# Patient Record
Sex: MALE | Race: WHITE | Employment: FULL TIME | URBAN - METROPOLITAN AREA
[De-identification: names, ages, dates, MRNs, and addresses within clinical notes are randomized per-mention and may not be internally consistent; named-entity substitution may affect disease eponyms.]

---

## 2017-01-17 RX ORDER — METOPROLOL SUCCINATE 25 MG/1
25 TABLET, EXTENDED RELEASE ORAL DAILY
Qty: 30 TABLET | Refills: 3 | Status: SHIPPED | OUTPATIENT
Start: 2017-01-17 | End: 2017-04-26 | Stop reason: SDUPTHER

## 2017-04-26 ENCOUNTER — OFFICE VISIT (OUTPATIENT)
Dept: CARDIOLOGY | Age: 47
End: 2017-04-26
Payer: COMMERCIAL

## 2017-04-26 ENCOUNTER — TELEPHONE (OUTPATIENT)
Dept: CARDIOLOGY | Age: 47
End: 2017-04-26

## 2017-04-26 VITALS
RESPIRATION RATE: 20 BRPM | HEIGHT: 70 IN | SYSTOLIC BLOOD PRESSURE: 122 MMHG | WEIGHT: 246 LBS | HEART RATE: 94 BPM | DIASTOLIC BLOOD PRESSURE: 82 MMHG | OXYGEN SATURATION: 94 % | BODY MASS INDEX: 35.22 KG/M2

## 2017-04-26 DIAGNOSIS — Z82.49 FAMILY HISTORY OF ABDOMINAL AORTIC ANEURYSM (AAA): ICD-10-CM

## 2017-04-26 DIAGNOSIS — R00.2 HEART PALPITATIONS: ICD-10-CM

## 2017-04-26 DIAGNOSIS — R42 DIZZINESS AND GIDDINESS: Primary | ICD-10-CM

## 2017-04-26 DIAGNOSIS — R07.89 CHEST PAIN, NON-CARDIAC: ICD-10-CM

## 2017-04-26 DIAGNOSIS — E66.01 SEVERE OBESITY (BMI 35.0-39.9): ICD-10-CM

## 2017-04-26 DIAGNOSIS — I10 ESSENTIAL HYPERTENSION: ICD-10-CM

## 2017-04-26 PROCEDURE — 99214 OFFICE O/P EST MOD 30 MIN: CPT | Performed by: INTERNAL MEDICINE

## 2017-04-26 RX ORDER — METOPROLOL SUCCINATE 25 MG/1
25 TABLET, EXTENDED RELEASE ORAL 2 TIMES DAILY
Qty: 30 TABLET | Refills: 3 | Status: SHIPPED | OUTPATIENT
Start: 2017-04-26 | End: 2017-09-25 | Stop reason: SDUPTHER

## 2017-05-02 ENCOUNTER — HOSPITAL ENCOUNTER (OUTPATIENT)
Dept: CT IMAGING | Age: 47
Discharge: HOME | End: 2017-05-02
Payer: COMMERCIAL

## 2017-05-02 DIAGNOSIS — Z82.49 FAMILY HISTORY OF ABDOMINAL AORTIC ANEURYSM (AAA): ICD-10-CM

## 2017-05-02 PROCEDURE — 75635 CT ANGIO ABDOMINAL ARTERIES: CPT

## 2017-05-02 PROCEDURE — 6360000004 HC RX CONTRAST MEDICATION: Performed by: INTERNAL MEDICINE

## 2017-05-02 PROCEDURE — 71275 CT ANGIOGRAPHY CHEST: CPT

## 2017-05-02 RX ADMIN — IOVERSOL 100 ML: 678 INJECTION INTRA-ARTERIAL; INTRAVENOUS at 17:19

## 2017-05-11 ENCOUNTER — HOSPITAL ENCOUNTER (OUTPATIENT)
Age: 47
Discharge: HOME | End: 2017-05-11
Payer: COMMERCIAL

## 2017-05-11 ENCOUNTER — OFFICE VISIT (OUTPATIENT)
Dept: CARDIOLOGY | Age: 47
End: 2017-05-11
Payer: COMMERCIAL

## 2017-05-11 VITALS
BODY MASS INDEX: 34.99 KG/M2 | WEIGHT: 244.4 LBS | DIASTOLIC BLOOD PRESSURE: 76 MMHG | HEART RATE: 89 BPM | HEIGHT: 70 IN | SYSTOLIC BLOOD PRESSURE: 119 MMHG

## 2017-05-11 DIAGNOSIS — E78.5 DYSLIPIDEMIA: ICD-10-CM

## 2017-05-11 DIAGNOSIS — I20.9 ISCHEMIC CHEST PAIN (HCC): ICD-10-CM

## 2017-05-11 DIAGNOSIS — I25.84 CORONARY ARTERY CALCIFICATION: ICD-10-CM

## 2017-05-11 DIAGNOSIS — I25.10 CORONARY ARTERY CALCIFICATION: Primary | ICD-10-CM

## 2017-05-11 DIAGNOSIS — G47.33 OBSTRUCTIVE SLEEP APNEA: ICD-10-CM

## 2017-05-11 DIAGNOSIS — I25.10 CORONARY ARTERY CALCIFICATION: ICD-10-CM

## 2017-05-11 DIAGNOSIS — I25.84 CORONARY ARTERY CALCIFICATION: Primary | ICD-10-CM

## 2017-05-11 DIAGNOSIS — Z78.9 STATIN INTOLERANCE: ICD-10-CM

## 2017-05-11 LAB
ANION GAP SERPL CALCULATED.3IONS-SCNC: 16 MMOL/L (ref 9–17)
BUN BLDV-MCNC: 16 MG/DL (ref 6–20)
BUN/CREAT BLD: 16 (ref 9–20)
CALCIUM SERPL-MCNC: 9.5 MG/DL (ref 8.6–10.4)
CHLORIDE BLD-SCNC: 101 MMOL/L (ref 98–107)
CO2: 24 MMOL/L (ref 20–31)
CREAT SERPL-MCNC: 1 MG/DL (ref 0.7–1.2)
GFR AFRICAN AMERICAN: >60 ML/MIN
GFR NON-AFRICAN AMERICAN: >60 ML/MIN
GFR SERPL CREATININE-BSD FRML MDRD: NORMAL ML/MIN/{1.73_M2}
GFR SERPL CREATININE-BSD FRML MDRD: NORMAL ML/MIN/{1.73_M2}
GLUCOSE BLD-MCNC: 98 MG/DL (ref 70–99)
HCT VFR BLD CALC: 47.5 % (ref 41–53)
HEMOGLOBIN: 15.9 G/DL (ref 13.5–17)
MCH RBC QN AUTO: 28.7 PG (ref 26–34)
MCHC RBC AUTO-ENTMCNC: 33.4 G/DL (ref 31–37)
MCV RBC AUTO: 86 FL (ref 80–100)
PDW BLD-RTO: 14.3 % (ref 12.1–15.2)
PLATELET # BLD: 241 K/UL (ref 140–450)
PMV BLD AUTO: NORMAL FL (ref 6–12)
POTASSIUM SERPL-SCNC: 4.3 MMOL/L (ref 3.7–5.3)
RBC # BLD: 5.53 M/UL (ref 4.5–5.9)
SODIUM BLD-SCNC: 141 MMOL/L (ref 135–144)
WBC # BLD: 8.7 K/UL (ref 3.5–11)

## 2017-05-11 PROCEDURE — 85027 COMPLETE CBC AUTOMATED: CPT

## 2017-05-11 PROCEDURE — 99215 OFFICE O/P EST HI 40 MIN: CPT | Performed by: INTERNAL MEDICINE

## 2017-05-11 PROCEDURE — 36415 COLL VENOUS BLD VENIPUNCTURE: CPT

## 2017-05-11 PROCEDURE — 80048 BASIC METABOLIC PNL TOTAL CA: CPT

## 2017-05-11 RX ORDER — ASPIRIN 81 MG/1
81 TABLET ORAL DAILY
Qty: 30 TABLET | Refills: 3 | Status: SHIPPED | OUTPATIENT
Start: 2017-05-11 | End: 2018-11-26

## 2017-05-12 ENCOUNTER — HOSPITAL ENCOUNTER (OUTPATIENT)
Dept: CARDIAC CATH/INVASIVE PROCEDURES | Age: 47
Discharge: HOME | End: 2017-05-12
Payer: COMMERCIAL

## 2017-05-12 VITALS
RESPIRATION RATE: 16 BRPM | OXYGEN SATURATION: 92 % | SYSTOLIC BLOOD PRESSURE: 116 MMHG | WEIGHT: 244.49 LBS | TEMPERATURE: 98 F | HEART RATE: 71 BPM | DIASTOLIC BLOOD PRESSURE: 77 MMHG | BODY MASS INDEX: 35 KG/M2 | HEIGHT: 70 IN

## 2017-05-12 PROCEDURE — 99152 MOD SED SAME PHYS/QHP 5/>YRS: CPT | Performed by: FAMILY MEDICINE

## 2017-05-12 PROCEDURE — 2580000003 HC RX 258: Performed by: FAMILY MEDICINE

## 2017-05-12 PROCEDURE — 93458 L HRT ARTERY/VENTRICLE ANGIO: CPT | Performed by: FAMILY MEDICINE

## 2017-05-12 PROCEDURE — 2500000003 HC RX 250 WO HCPCS

## 2017-05-12 PROCEDURE — C1887 CATHETER, GUIDING: HCPCS

## 2017-05-12 PROCEDURE — C1769 GUIDE WIRE: HCPCS

## 2017-05-12 PROCEDURE — C1894 INTRO/SHEATH, NON-LASER: HCPCS

## 2017-05-12 PROCEDURE — 6360000002 HC RX W HCPCS

## 2017-05-12 PROCEDURE — C1725 CATH, TRANSLUMIN NON-LASER: HCPCS

## 2017-05-12 RX ORDER — ACETAMINOPHEN 325 MG/1
650 TABLET ORAL EVERY 4 HOURS PRN
Status: DISCONTINUED | OUTPATIENT
Start: 2017-05-12 | End: 2017-05-13 | Stop reason: HOSPADM

## 2017-05-12 RX ORDER — SODIUM CHLORIDE 0.9 % (FLUSH) 0.9 %
10 SYRINGE (ML) INJECTION EVERY 12 HOURS SCHEDULED
Status: DISCONTINUED | OUTPATIENT
Start: 2017-05-12 | End: 2017-05-13 | Stop reason: HOSPADM

## 2017-05-12 RX ORDER — SODIUM CHLORIDE 0.9 % (FLUSH) 0.9 %
10 SYRINGE (ML) INJECTION PRN
Status: DISCONTINUED | OUTPATIENT
Start: 2017-05-12 | End: 2017-05-13 | Stop reason: HOSPADM

## 2017-05-12 RX ORDER — NITROGLYCERIN 0.4 MG/1
0.4 TABLET SUBLINGUAL EVERY 5 MIN PRN
Status: DISCONTINUED | OUTPATIENT
Start: 2017-05-12 | End: 2017-05-13 | Stop reason: HOSPADM

## 2017-05-12 RX ORDER — DIPHENHYDRAMINE HCL 25 MG
50 TABLET ORAL ONCE
Status: DISCONTINUED | OUTPATIENT
Start: 2017-05-12 | End: 2017-05-13 | Stop reason: HOSPADM

## 2017-05-12 RX ORDER — SODIUM CHLORIDE 9 MG/ML
INJECTION, SOLUTION INTRAVENOUS CONTINUOUS
Status: DISCONTINUED | OUTPATIENT
Start: 2017-05-12 | End: 2017-05-13 | Stop reason: HOSPADM

## 2017-05-12 RX ADMIN — SODIUM CHLORIDE: 9 INJECTION, SOLUTION INTRAVENOUS at 09:55

## 2017-05-22 ENCOUNTER — OFFICE VISIT (OUTPATIENT)
Dept: CARDIOLOGY | Age: 47
End: 2017-05-22
Payer: COMMERCIAL

## 2017-05-22 VITALS
OXYGEN SATURATION: 94 % | BODY MASS INDEX: 34.38 KG/M2 | WEIGHT: 245.6 LBS | HEIGHT: 71 IN | SYSTOLIC BLOOD PRESSURE: 126 MMHG | HEART RATE: 120 BPM | DIASTOLIC BLOOD PRESSURE: 84 MMHG

## 2017-05-22 DIAGNOSIS — E66.9 OBESITY (BMI 30.0-34.9): ICD-10-CM

## 2017-05-22 DIAGNOSIS — E78.2 MIXED HYPERLIPIDEMIA: ICD-10-CM

## 2017-05-22 DIAGNOSIS — I25.10 CORONARY ARTERY CALCIFICATION SEEN ON CT SCAN: ICD-10-CM

## 2017-05-22 DIAGNOSIS — I25.10 MILD CAD: Primary | ICD-10-CM

## 2017-05-22 DIAGNOSIS — Z78.9 STATIN INTOLERANCE: ICD-10-CM

## 2017-05-22 DIAGNOSIS — G47.33 OSA (OBSTRUCTIVE SLEEP APNEA): ICD-10-CM

## 2017-05-22 PROCEDURE — 99214 OFFICE O/P EST MOD 30 MIN: CPT | Performed by: INTERNAL MEDICINE

## 2017-05-22 PROCEDURE — 93000 ELECTROCARDIOGRAM COMPLETE: CPT | Performed by: INTERNAL MEDICINE

## 2017-06-22 ENCOUNTER — TELEPHONE (OUTPATIENT)
Dept: CARDIOLOGY | Age: 47
End: 2017-06-22

## 2017-09-25 RX ORDER — METOPROLOL SUCCINATE 25 MG/1
25 TABLET, EXTENDED RELEASE ORAL 2 TIMES DAILY
Qty: 180 TABLET | Refills: 3 | Status: SHIPPED | OUTPATIENT
Start: 2017-09-25 | End: 2018-10-15 | Stop reason: SDUPTHER

## 2017-10-06 ENCOUNTER — APPOINTMENT (OUTPATIENT)
Dept: GENERAL RADIOLOGY | Age: 47
End: 2017-10-06
Payer: COMMERCIAL

## 2017-10-06 ENCOUNTER — HOSPITAL ENCOUNTER (EMERGENCY)
Age: 47
Discharge: HOME | End: 2017-10-06
Attending: EMERGENCY MEDICINE
Payer: COMMERCIAL

## 2017-10-06 VITALS
TEMPERATURE: 98.5 F | BODY MASS INDEX: 33.64 KG/M2 | HEIGHT: 70 IN | HEART RATE: 81 BPM | SYSTOLIC BLOOD PRESSURE: 116 MMHG | RESPIRATION RATE: 16 BRPM | OXYGEN SATURATION: 92 % | WEIGHT: 235 LBS | DIASTOLIC BLOOD PRESSURE: 82 MMHG

## 2017-10-06 DIAGNOSIS — R07.9 CHEST PAIN, UNSPECIFIED TYPE: Primary | ICD-10-CM

## 2017-10-06 LAB
ABSOLUTE EOS #: 0.16 K/UL (ref 0–0.4)
ABSOLUTE LYMPH #: 2.95 K/UL (ref 1–4.8)
ABSOLUTE MONO #: 0.74 K/UL (ref 0–1)
ALBUMIN SERPL-MCNC: 4.4 G/DL (ref 3.5–5.2)
ALBUMIN/GLOBULIN RATIO: 1.4 (ref 1–2.5)
ALP BLD-CCNC: 74 U/L (ref 40–129)
ALT SERPL-CCNC: 69 U/L (ref 5–41)
ANION GAP SERPL CALCULATED.3IONS-SCNC: 16 MMOL/L (ref 9–17)
AST SERPL-CCNC: 45 U/L
BASOPHILS # BLD: 0 %
BASOPHILS ABSOLUTE: 0 K/UL (ref 0–0.2)
BILIRUB SERPL-MCNC: 0.54 MG/DL (ref 0.3–1.2)
BUN BLDV-MCNC: 14 MG/DL (ref 6–20)
BUN/CREAT BLD: 12 (ref 9–20)
CALCIUM SERPL-MCNC: 9.6 MG/DL (ref 8.6–10.4)
CHLORIDE BLD-SCNC: 104 MMOL/L (ref 98–107)
CO2: 23 MMOL/L (ref 20–31)
CREAT SERPL-MCNC: 1.13 MG/DL (ref 0.7–1.2)
D-DIMER QUANTITATIVE: <0.19 MG/L FEU (ref 0.19–0.5)
DIFFERENTIAL TYPE: NORMAL
EKG ATRIAL RATE: 87 BPM
EKG P AXIS: 38 DEGREES
EKG P-R INTERVAL: 148 MS
EKG Q-T INTERVAL: 370 MS
EKG QRS DURATION: 84 MS
EKG QTC CALCULATION (BAZETT): 445 MS
EKG R AXIS: -4 DEGREES
EKG T AXIS: 30 DEGREES
EKG VENTRICULAR RATE: 87 BPM
EOSINOPHILS RELATIVE PERCENT: 2 %
GFR AFRICAN AMERICAN: >60 ML/MIN
GFR NON-AFRICAN AMERICAN: >60 ML/MIN
GFR SERPL CREATININE-BSD FRML MDRD: ABNORMAL ML/MIN/{1.73_M2}
GFR SERPL CREATININE-BSD FRML MDRD: ABNORMAL ML/MIN/{1.73_M2}
GLUCOSE BLD-MCNC: 97 MG/DL (ref 70–99)
HCT VFR BLD CALC: 48.3 % (ref 41–53)
HEMOGLOBIN: 15.8 G/DL (ref 13.5–17)
LYMPHOCYTES # BLD: 36 %
MCH RBC QN AUTO: 28.6 PG (ref 26–34)
MCHC RBC AUTO-ENTMCNC: 32.8 G/DL (ref 31–37)
MCV RBC AUTO: 87.3 FL (ref 80–100)
MONOCYTES # BLD: 9 %
MORPHOLOGY: NORMAL
PDW BLD-RTO: 14.4 % (ref 12.1–15.2)
PLATELET # BLD: 227 K/UL (ref 140–450)
PLATELET ESTIMATE: NORMAL
PMV BLD AUTO: 8.9 FL (ref 6–12)
POTASSIUM SERPL-SCNC: 4.3 MMOL/L (ref 3.7–5.3)
RBC # BLD: 5.53 M/UL (ref 4.5–5.9)
RBC # BLD: NORMAL 10*6/UL
SEG NEUTROPHILS: 53 %
SEGMENTED NEUTROPHILS ABSOLUTE COUNT: 4.35 K/UL (ref 1.8–7.7)
SODIUM BLD-SCNC: 143 MMOL/L (ref 135–144)
TOTAL PROTEIN: 7.6 G/DL (ref 6.4–8.3)
TROPONIN INTERP: NORMAL
TROPONIN T: <0.03 NG/ML
WBC # BLD: 8.2 K/UL (ref 3.5–11)
WBC # BLD: NORMAL 10*3/UL

## 2017-10-06 PROCEDURE — 36415 COLL VENOUS BLD VENIPUNCTURE: CPT

## 2017-10-06 PROCEDURE — 6360000002 HC RX W HCPCS: Performed by: EMERGENCY MEDICINE

## 2017-10-06 PROCEDURE — 85025 COMPLETE CBC W/AUTO DIFF WBC: CPT

## 2017-10-06 PROCEDURE — 99285 EMERGENCY DEPT VISIT HI MDM: CPT

## 2017-10-06 PROCEDURE — 80053 COMPREHEN METABOLIC PANEL: CPT

## 2017-10-06 PROCEDURE — 96374 THER/PROPH/DIAG INJ IV PUSH: CPT

## 2017-10-06 PROCEDURE — 85379 FIBRIN DEGRADATION QUANT: CPT

## 2017-10-06 PROCEDURE — 71020 XR CHEST STANDARD TWO VW: CPT

## 2017-10-06 PROCEDURE — 84484 ASSAY OF TROPONIN QUANT: CPT

## 2017-10-06 PROCEDURE — 6370000000 HC RX 637 (ALT 250 FOR IP): Performed by: EMERGENCY MEDICINE

## 2017-10-06 PROCEDURE — 93005 ELECTROCARDIOGRAM TRACING: CPT

## 2017-10-06 RX ORDER — KETOROLAC TROMETHAMINE 15 MG/ML
15 INJECTION, SOLUTION INTRAMUSCULAR; INTRAVENOUS ONCE
Status: COMPLETED | OUTPATIENT
Start: 2017-10-06 | End: 2017-10-06

## 2017-10-06 RX ORDER — ASPIRIN 81 MG/1
324 TABLET, CHEWABLE ORAL ONCE
Status: COMPLETED | OUTPATIENT
Start: 2017-10-06 | End: 2017-10-06

## 2017-10-06 RX ADMIN — KETOROLAC TROMETHAMINE 15 MG: 15 INJECTION, SOLUTION INTRAMUSCULAR; INTRAVENOUS at 16:42

## 2017-10-06 RX ADMIN — ASPIRIN 81 MG 324 MG: 81 TABLET ORAL at 16:41

## 2017-10-06 ASSESSMENT — ENCOUNTER SYMPTOMS
BACK PAIN: 0
ABDOMINAL PAIN: 0
SHORTNESS OF BREATH: 0
BLOOD IN STOOL: 0
PHOTOPHOBIA: 0
DIARRHEA: 0
NAUSEA: 0
TROUBLE SWALLOWING: 0
VOICE CHANGE: 0
SORE THROAT: 0
WHEEZING: 0
FACIAL SWELLING: 0
CHEST TIGHTNESS: 0
COUGH: 0
VOMITING: 0

## 2017-10-06 ASSESSMENT — PAIN DESCRIPTION - PAIN TYPE: TYPE: ACUTE PAIN

## 2017-10-06 ASSESSMENT — PAIN SCALES - GENERAL
PAINLEVEL_OUTOF10: 3
PAINLEVEL_OUTOF10: 1

## 2017-10-06 ASSESSMENT — PAIN DESCRIPTION - LOCATION: LOCATION: CHEST

## 2017-10-06 NOTE — ED PROVIDER NOTES
[facility] ED  eMERGENCY dEPARTMENT eNCOUnter      Pt Name: Monse Solis  MRN: [id number]  [place] 1970  Date of evaluation: 10/6/2017  Provider: Lukas Garcia, [address] [place] [address]       Chief Complaint   Patient presents with    Chest Pain     in left chest area       HISTORY OF PRESENT ILLNESS    Monse Solis is a 48 y.o. male who presents to the emergency department from home With complaints of sudden onset of approximately an hour and a half of very intermittent sharp pinpoint chest discomfort underneath his left breast.  He states symptoms started at work, where he had a very light day-to-day, has not been exerting himself. He denies any shortness of breath or diaphoresis. He denies any worsening with deep breath. He states the symptoms resolved completely. He denies any lower extremity pain or swelling. Denies any diaphoresis. Has a history can't a catheterization a few months ago which showed no significant obstructive CAD. No history of recent travel, no history of trauma, no history of pulmonary emboli or DVT or clotting disorders in the past      Triage notes and Nursing notes were reviewed by myself. Any discrepancies are addressed above. PAST MEDICAL HISTORY     Past Medical History:   Diagnosis Date    Anxiety     Asthma     Bronchitis jan. 2013    Chronic kidney disease     kidney stone    CPAP (continuous positive airway pressure) dependence     Depression     Esophageal reflux     Essential and other specified forms of tremor     GERD (gastroesophageal reflux disease)     H/O echocardiogram 01/05/2016    EF 55%. Mild LV hypertrophy. Mild diastolic dysfunction is seen.  Herniated disc     History of Holter monitoring 09/20/2016    sinus rhythm with sinus tachycardia 27% of the study duration,average HR 90 bpm ranging between 51 - 147 bpm. Rare isolated PAC's and PVC's    History of stress test 01/05/2016    Relatively normal.  EF 63%.  [facility]  COPD Father     Heart Disease Father     Lung Cancer Father     Heart Defect Sister      or as a baby    Stroke Maternal Grandmother     Other Paternal Grandmother      tremors    Heart Disease Paternal Grandfather         SOCIAL HISTORY       Social History     Social History    Marital status:      Spouse name: N/A    Number of children: N/A    Years of education: N/A     Occupational History          Social History Main Topics    Smoking status: Never Smoker    Smokeless tobacco: Never Used    Alcohol use No    Drug use: No    Sexual activity: Not Asked     Other Topics Concern    None     Social History Narrative       REVIEW OF SYSTEMS       Review of Systems   Constitutional: Negative for chills, diaphoresis and fever. HENT: Negative for facial swelling, sore throat, trouble swallowing and voice change. Eyes: Negative for photophobia and visual disturbance. Respiratory: Negative for cough, chest tightness, shortness of breath and wheezing. Cardiovascular: Positive for chest pain. Negative for palpitations and leg swelling. Gastrointestinal: Negative for abdominal pain, blood in stool, diarrhea, nausea and vomiting. Endocrine: Negative for polydipsia and polyuria. Genitourinary: Negative for dysuria, frequency, hematuria and urgency. Musculoskeletal: Negative for back pain, neck pain and neck stiffness. Skin: Negative for pallor and rash. Neurological: Negative for seizures, speech difficulty, weakness, numbness and headaches. Hematological: Does not bruise/bleed easily. Psychiatric/Behavioral: Negative for confusion. The patient is not nervous/anxious. Except as noted above the remainder of the review of systems was reviewed and is negative.    PHYSICAL EXAM    (up to 7 for level 4, 8 or more for level 5)   ED Triage Vitals   BP Temp Temp Source Pulse Resp SpO2 Height Weight   10/06/17 1549 10/06/17 1549 10/06/17 1549 10/06/17  10/06/17 1549 10/06/17 1549 10/06/17 1549 10/06/17 1549   141/88 98.5 °F (36.9 °C) Tympanic 84 20 94 % 5' 10\" (1.778 m) 235 lb (106.6 kg)       Physical Exam   Constitutional: He is oriented to person, place, and time. Vital signs are normal. He appears well-developed and well-nourished. Non-toxic appearance. He does not appear ill. No distress. HENT:   Head: Normocephalic and atraumatic. Mouth/Throat: Oropharynx is clear and moist.   Eyes: Conjunctivae and EOM are normal. Pupils are equal, round, and reactive to light. Right conjunctiva is not injected. Left conjunctiva is not injected. No scleral icterus. Neck: Normal range of motion. Neck supple. No tracheal deviation present. No thyromegaly present. Cardiovascular: Normal rate, regular rhythm, normal heart sounds and intact distal pulses. Exam reveals no gallop and no friction rub. No murmur heard. Pulmonary/Chest: Effort normal and breath sounds normal. No stridor. No respiratory distress. He has no wheezes. He has no rales. He exhibits tenderness (very reproducible pinpointtenderness on palpation of approximately 6 rib just below the left breast.). Abdominal: Soft. Bowel sounds are normal. He exhibits no distension and no mass. There is no tenderness. There is no rebound and no guarding. Negative Huffman's sign  Nontender McBurney's Point  Negative Rovsig's sign  No bruising or echymosis of abdomen   Musculoskeletal: He exhibits no edema or tenderness. Negative Preston's Sign bilaterally   Lymphadenopathy:     He has no cervical adenopathy. Neurological: He is alert and oriented to person, place, and time. No cranial nerve deficit. He exhibits normal muscle tone. Coordination normal.   No nystagmus   Skin: Skin is warm and dry. No rash noted. He is not diaphoretic. No erythema. No pallor. Psychiatric: He has a normal mood and affect. His behavior is normal. Thought content normal.   Nursing note and vitals reviewed.       DIAGNOSTIC IMPRESSION      1.  Chest pain, unspecified type          DISPOSITION/PLAN   DISPOSITION Decision to Discharge    PATIENT REFERRED TO:  July Cohen MD  [address] [facility] [address]  [phone]    In 2 days        DISCHARGE MEDICATIONS:  New Prescriptions    No medications on file              (Please note that portions of this note were completed with a voice recognition program.  Efforts were made to edit the dictations but occasionally words are mis-transcribed.)      Renata Lowe DO (electronically signed)  Attending Emergency Physician         Sinan Patel,   10/06/17 [address], DO  10/06/17 [id number]

## 2017-10-06 NOTE — ED AVS SNAPSHOT
After Visit Summary  (Discharge Instructions)    Medication List for Home    Based on the information you provided to us as well as any changes during this visit, the following is your updated medication list.  Compare this with your prescription bottles at home. If you have any questions or concerns, contact your primary care physician's office. Daily Medication List (This medication list can be shared with any Healthcare provider who is helping you manage your medications)      ASK your doctor about these medications if you have questions     aspirin EC 81 MG EC tablet   Take 1 tablet by mouth daily       budesonide-formoterol 160-4.5 MCG/ACT Aero   Commonly known as:  SYMBICORT   Inhale 2 puffs into the lungs 2 times daily       fluocinonide 0.05 % cream   Commonly known as:  LIDEX   APPLY TO AFFECTED AREAS 2 TIMES A DAY. fluticasone 50 MCG/ACT nasal spray   Commonly known as:  FLONASE   2 sprays by Nasal route daily       metoprolol succinate 25 MG extended release tablet   Commonly known as:  TOPROL XL   Take 1 tablet by mouth 2 times daily       midodrine 10 MG tablet   Commonly known as:  PROAMATINE   Take 1 tablet by mouth 2 times daily       omeprazole 40 MG delayed release capsule   Commonly known as:  PRILOSEC   TAKE 1 CAPSULE BY MOUTH ONCE DAILY       pitavastatin 1 MG Tabs tablet   Commonly known as:  LIVALO   Take 1 tablet by mouth nightly               Allergies as of 10/6/2017     No Known Allergies      Immunizations as of 10/6/2017     Name Date Dose VIS Date Route    Td 2/26/2004 -- -- --         After Visit Summary    This summary was created for you. Thank you for entrusting your care to us.   The following information includes details about your hospital/visit stay along with steps you should take to help with your recovery once you leave the hospital.  In this packet, you will find information about the topics listed below: · Instructions about your medications including a list of your home medications  · A summary of your hospital visit  · Follow-up appointments once you have left the hospital  · Your care plan at home      You may receive a survey regarding the care you received during your stay. Your input is valuable to us. We encourage you to complete and return your survey in the envelope provided. We hope you will choose us in the future for your healthcare needs. Patient Information     Patient Name SCOUT Paiz 1970      Care Provided at:     Name Address Phone       [facility] [place] [phone]            Your Visit    Here you will find information about your visit, including the reason for your visit. Please take this sheet with you when you visit your doctor or other health care provider in the future. It will help determine the best possible medical care for you at that time. If you have any questions once you leave the hospital, please call the department phone number listed below. Diagnoses this visit     Your diagnosis was CHEST PAIN, UNSPECIFIED TYPE. Visit Information     Date of Visit Department Dept Phone    10/6/2017 [facility] [phone]      You were seen by     You were seen by Lucas Kayser, DO. Follow-up Appointments    Below is a list of your follow-up and future appointments. This may not be a complete list as you may have made appointments directly with providers that we are not aware of or your providers may have made some for you. Please call your providers to confirm appointments. It is important to keep your appointments. Please bring your current insurance card, photo ID, co-pay, and all medication bottles to your appointment. If self-pay, payment is expected at the time of service. Follow-up Information     Follow up with Kristan Colon MD In 2 days. Specialty:  Internal Medicine    Contact information:    [address]  [phone]        Future Appointments     11/1/2017 3:00 PM     Appointment with Nadia Cleary MD at [address] [facility] ([phone])   Please arrive 15 minutes prior to appointment time, bring insurance card and photo ID. [address] [place] [id number]         Preventive Care        Date Due    HIV screening is recommended for all people regardless of risk factors  aged 15-65 years at least once (lifetime) who have never been HIV tested. 12/16/1983    Tetanus Combination Vaccine (1 - Tdap) 2/27/2004    Yearly Flu Vaccine (1) 9/1/2017    Cholesterol Screening 7/17/2022                 Care Plan Once You Return Home    This section includes instructions you will need to follow once you leave the hospital.  Your care team will discuss these with you, so you and those caring for you know how to best care for your health needs at home. This section may also include educational information about certain health topics that may be of help to you. Important Information if you smoke or are exposed to smoking       SMOKING: QUIT SMOKING. THIS IS THE MOST IMPORTANT ACTION YOU CAN TAKE TO IMPROVE YOUR CURRENT AND FUTURE HEALTH. Call the [address] [place] [address] at [place] NOW ([phone])    Smoking harms nonsmokers. When nonsmokers are around people who smoke, they absorb nicotine, carbon monoxide, and other ingredients of tobacco smoke. DO NOT SMOKE AROUND CHILDREN     Children exposed to secondhand smoke are at an increased risk of:  Sudden Infant Death Syndrome (SIDS), acute respiratory infections, inflammation of the middle ear, and severe asthma. Over a longer time, it causes heart disease and lung cancer. There is no safe level of exposure to secondhand smoke.                 MyChart Signup belly or in one or both shoulders or arms. ¨ Lightheadedness or sudden weakness. ¨ A fast or irregular heartbeat. After you call 911, the  may tell you to chew 1 adult-strength or 2 to 4 low-dose aspirin. Wait for an ambulance. Do not try to drive yourself. Call your doctor today if:  · You have any trouble breathing. · Your chest pain gets worse. · You are dizzy or lightheaded, or you feel like you may faint. · You are not getting better as expected. · You are having new or different chest pain. Where can you learn more? Go to https://OpenPortal.Metaversum. org and sign in to your Filecubed account. Enter A120 in the [employer] box to learn more about \"Chest Pain: Care Instructions. \"     If you do not have an account, please click on the \"Sign Up Now\" link. Current as of: March 20, 2017  Content Version: 11.3  © 1055-7902 [employer], [employer]. Care instructions adapted under license by [facility] ([facility]). If you have questions about a medical condition or this instruction, always ask your healthcare professional. [place] [address] any warranty or liability for your use of this information. Return to the Emergency Department immediately if you develop worsening chest pain, shortness of breath, breaking out in cold sweats, feeling faint , or you have any other concerns. Please follow up with your primary care doctor in 1-2 days.

## 2017-11-01 ENCOUNTER — OFFICE VISIT (OUTPATIENT)
Dept: CARDIOLOGY | Age: 47
End: 2017-11-01
Payer: COMMERCIAL

## 2017-11-01 VITALS
BODY MASS INDEX: 35.16 KG/M2 | HEART RATE: 85 BPM | HEIGHT: 70 IN | SYSTOLIC BLOOD PRESSURE: 117 MMHG | OXYGEN SATURATION: 96 % | DIASTOLIC BLOOD PRESSURE: 85 MMHG | WEIGHT: 245.6 LBS

## 2017-11-01 DIAGNOSIS — I25.10 CORONARY ARTERY CALCIFICATION: Primary | ICD-10-CM

## 2017-11-01 DIAGNOSIS — E78.5 DYSLIPIDEMIA: ICD-10-CM

## 2017-11-01 DIAGNOSIS — I25.84 CORONARY ARTERY CALCIFICATION: Primary | ICD-10-CM

## 2017-11-01 DIAGNOSIS — R94.09 ABNORMAL TILT TABLE TEST: ICD-10-CM

## 2017-11-01 DIAGNOSIS — Z78.9 STATIN INTOLERANCE: ICD-10-CM

## 2017-11-01 PROCEDURE — 99213 OFFICE O/P EST LOW 20 MIN: CPT | Performed by: INTERNAL MEDICINE

## 2017-11-01 NOTE — PATIENT INSTRUCTIONS
doctor advises it, take 1 low-dose aspirin a day to prevent heart attack. · Be safe with medicines. Take your medicines exactly as prescribed. Call your doctor if you think you are having a problem with your medicine. You will get more details on the specific medicines your doctor prescribes. Lifestyle changes  · Do not smoke. If you need help quitting, talk to your doctor about stop-smoking programs and medicines. These can increase your chances of quitting for good. · Eat a heart-healthy diet that is low in saturated fat and salt, and is high in fiber. Talk to your doctor or a dietitian about healthy eating. · Stay at a healthy weight. Or lose weight if you need to. Activity  · Talk to your doctor about a level of activity that is safe for you. · If an activity causes angina symptoms, stop and rest.  When should you call for help? Call 911 anytime you think you may need emergency care. For example, call if:  · You passed out (lost consciousness). · You have symptoms of a heart attack. These may include:  ¨ Chest pain or pressure, or a strange feeling in the chest.  ¨ Sweating. ¨ Shortness of breath. ¨ Nausea or vomiting. ¨ Pain, pressure, or a strange feeling in the back, neck, jaw, or upper belly or in one or both shoulders or arms. ¨ Lightheadedness or sudden weakness. ¨ A fast or irregular heartbeat. After you call 911, the  may tell you to chew 1 adult-strength or 2 to 4 low-dose aspirin. Wait for an ambulance. Do not try to drive yourself. · You have angina symptoms that do not go away with rest or are not getting better within 5 minutes after you take a dose of nitroglycerin. Call your doctor now or seek immediate medical care if:  · You are having angina symptoms more often than usual, or they are different or worse than usual.  · You feel dizzy or lightheaded, or you feel like you may faint.   Watch closely for changes in your health, and be sure to contact your doctor if you have any problems. Where can you learn more? Go to https://chpepiceweb.PlumChoice. org and sign in to your for; to (do)t account. Enter H129 in the [employer] Data box to learn more about \"Angina: Care Instructions. \"     If you do not have an account, please click on the \"Sign Up Now\" link. Current as of: April 3, 2017  Content Version: 11.3  © 6203-9107 [employer]. Care instructions adapted under license by [facility] ([facility]). If you have questions about a medical condition or this instruction, always ask your healthcare professional. [address] any warranty or liability for your use of this information.

## 2017-11-01 NOTE — PROGRESS NOTES
Subjective:     CHIEF COMPLAINT / HPI:   Chief Complaint   Patient presents with    6 Month Follow-Up     Hx: Hyperlipidemia,CAD. Livalo was started at last visit 5/17. He isn't wearing the CPAP (he doesn't want to be attached to a machine). C/o:Fatigue x2 weeks comes and goes,strong ache on his right upper quad. Denies any chest pain,pressure,shortness of breath,palpitations,dizziness/lightheadedness,edema or syncope. Emiliano Mcdowell is 48 y.o. male with multiple medical problems as outlined below who initially presented for evaluation of chest pain and dizziness. History of negative stress test on 1/6/2016. Patient had CTA of aorta to rule out aortic aneurysm and found to have dense calcification of the left main coronary artery leading to cardiac catheterization which revealed mild CAD without any focal stenosis. No prior history of myocardial infarction or heart failure. History of anxiety/panic attacks. History of sleep apnea, not using CPAP. History of dyslipidemia and statin intolerance. Finally tolerating Livalo 1 mg daily, started in July 2017. He is here today for follow-up. Denies any chest pain, pressure or tightness. No significant shortness of breath. No orthopnea or PND. No palpitations, dizziness or lightheadedness. No change in his weight. No problems with medications. Still refusing to use the  CPAP. Past Medical History:    Past Medical History:   Diagnosis Date    Anxiety     Asthma     Bronchitis jan. 2013    Chronic kidney disease     kidney stone    CPAP (continuous positive airway pressure) dependence     Depression     Esophageal reflux     Essential and other specified forms of tremor     GERD (gastroesophageal reflux disease)     H/O echocardiogram 01/05/2016    EF 55%. Mild LV hypertrophy. Mild diastolic dysfunction is seen.     Herniated disc     History of Holter monitoring 09/20/2016    sinus rhythm with sinus tachycardia 27% of the study duration,average HR 90 bpm ranging between 51 - 147 bpm. Rare isolated PAC's and PVC's    History of stress test 01/05/2016    Relatively normal.  EF 63%. Moise Treadmill score is 7, Low risk for CAD    Hx of tilt table evaluation 08/15/2016    Abnormal.  Heart rate,blood pressure response and symptoms were most consistent with dysautonomia.  Hyperlipidemia     Hypertension     Impaired fasting glucose     Kidney stone     Liver disease     fatty liver    Obstructive sleep apnea (adult) (pediatric)     Other and unspecified hyperlipidemia      Past Surgical History:  Past Surgical History:   Procedure Laterality Date    BLADDER STONE REMOVAL  1996    CARDIAC CATHETERIZATION Left 05/12/2017    via right radial approach/ [facility]/ Dr. Javier Jerez. LMCA: Normal 0%. stenosis. LAD: Mild irregularities 10-20%. LCx: Mild irregularities 20-30%. RCA: Normal 0% stenosis. small caliber vessel. EF 55%.  CHOLECYSTECTOMY  Aug 2011    COLONOSCOPY      ENDOSCOPY, COLON, DIAGNOSTIC      HEMORRHOID SURGERY  2007 or 2008    HERNIA REPAIR      Aug.2011    TONSILLECTOMY      age 23     Social History:    History   Smoking Status    Never Smoker   Smokeless Tobacco    Never Used     Current Medications:  Outpatient Prescriptions Marked as Taking for the 11/1/17 encounter (Office Visit) with Reza Monroe MD   Medication Sig Dispense Refill    metoprolol succinate (TOPROL XL) 25 MG extended release tablet Take 1 tablet by mouth 2 times daily 180 tablet 3    omeprazole (PRILOSEC) 40 MG delayed release capsule TAKE 1 CAPSULE BY MOUTH ONCE DAILY 30 capsule 5    pitavastatin (LIVALO) 1 MG TABS tablet Take 1 tablet by mouth nightly 30 tablet 3    aspirin EC 81 MG EC tablet Take 1 tablet by mouth daily 30 tablet 3    fluocinonide (LIDEX) 0.05 % cream APPLY TO AFFECTED AREAS 2 TIMES A DAY.  60 g 0    midodrine (PROAMATINE) 10 MG tablet Take 1 tablet by mouth 2 times daily 180 tablet 3       REVIEW OF SYSTEMS: 09/28/2016    [id number] 10.2 07/16/2012     Lab Results   Component Value Date    WBC 8.2 10/06/2017    HGB 15.8 10/06/2017    HCT 48.3 10/06/2017    MCV 87.3 10/06/2017     10/06/2017       Lab Results   Component Value Date    TRIG 369 07/17/2017    TRIG 335 08/08/2016    TRIG 366 (H) 01/05/2016     Lab Results   Component Value Date    HDL 36 07/17/2017    HDL 36 08/08/2016    HDL 30 (L) 01/05/2016     Lab Results   Component Value Date    LDLCHOLESTEROL 84 01/05/2016    LDLCHOLESTEROL 115 11/14/2014    LDLCHOLESTEROL 75 09/14/2012       Assessment:     1. Coronary artery calcification     2. Dyslipidemia  Hepatic Function Panel    Lipid Panel   3. Statin intolerance  Hepatic Function Panel    Lipid Panel   4. Abnormal tilt table test         Plan:     Patient initially presented for evaluation because of dizziness, chest pain and abnormal tilt table test.    Dense calcification of the left main coronary artery on chest CTA, S/P cardiac cath, mild CAD    Continue aggressive risk factor modification. Continue aspirin and metoprolol. Tolerating Livalo 1 mg without significant side effects. I will check his liver function and lipid panel. Continue midodrine, no significant dizziness. Counseled regarding diet, exercise and use of CPAP. Follow-up after 1 year or sooner if needed.

## 2017-11-06 RX ORDER — MIDODRINE HYDROCHLORIDE 10 MG/1
10 TABLET ORAL 2 TIMES DAILY
Qty: 180 TABLET | Refills: 3 | Status: SHIPPED | OUTPATIENT
Start: 2017-11-06 | End: 2017-12-08 | Stop reason: SDUPTHER

## 2017-12-02 ENCOUNTER — HOSPITAL ENCOUNTER (OUTPATIENT)
Age: 47
Discharge: HOME | End: 2017-12-02
Payer: COMMERCIAL

## 2017-12-02 DIAGNOSIS — E78.5 HYPERLIPIDEMIA, UNSPECIFIED HYPERLIPIDEMIA TYPE: ICD-10-CM

## 2017-12-02 LAB
ALBUMIN SERPL-MCNC: 4.4 G/DL (ref 3.5–5.2)
ALBUMIN/GLOBULIN RATIO: 1.6 (ref 1–2.5)
ALP BLD-CCNC: 69 U/L (ref 40–129)
ALT SERPL-CCNC: 62 U/L (ref 5–41)
AST SERPL-CCNC: 35 U/L
BILIRUB SERPL-MCNC: 0.69 MG/DL (ref 0.3–1.2)
BILIRUBIN DIRECT: <0.08 MG/DL
BILIRUBIN, INDIRECT: ABNORMAL MG/DL (ref 0–1)
CHOLESTEROL, FASTING: 117 MG/DL
CHOLESTEROL/HDL RATIO: 3.1
GLOBULIN: ABNORMAL G/DL (ref 1.5–3.8)
HDLC SERPL-MCNC: 38 MG/DL
LDL CHOLESTEROL: 53 MG/DL (ref 0–130)
TOTAL PROTEIN: 7.1 G/DL (ref 6.4–8.3)
TRIGLYCERIDE, FASTING: 131 MG/DL
VLDLC SERPL CALC-MCNC: ABNORMAL MG/DL (ref 1–30)

## 2017-12-02 PROCEDURE — 80076 HEPATIC FUNCTION PANEL: CPT

## 2017-12-02 PROCEDURE — 36415 COLL VENOUS BLD VENIPUNCTURE: CPT

## 2017-12-02 PROCEDURE — 80061 LIPID PANEL: CPT

## 2017-12-08 ENCOUNTER — TELEPHONE (OUTPATIENT)
Dept: CARDIOLOGY | Age: 47
End: 2017-12-08

## 2017-12-08 RX ORDER — MIDODRINE HYDROCHLORIDE 10 MG/1
10 TABLET ORAL 2 TIMES DAILY
Qty: 180 TABLET | Refills: 3 | Status: SHIPPED | OUTPATIENT
Start: 2017-12-08 | End: 2018-12-21 | Stop reason: SDUPTHER

## 2017-12-08 NOTE — TELEPHONE ENCOUNTER
Patient called for 2 different matters. 1. Patient has been having moderate-severe belly pain. Saw Dr Lili Dhaliwal on Wednesday who asked him if he had been taking any new medication. The only new most recent med is the Livalo that Dr Nitish Lang started him on to try. He does have a past medical history of statin intolerance. He also had not had a regular solid stool for some time. He wanted to know if the Livalo could cause this? Please advise. 2.  He also needs a refill of his Midodrine 10mg tabs sent to [facility]. Pended order for you.   Thanks

## 2017-12-14 ENCOUNTER — TELEPHONE (OUTPATIENT)
Dept: CARDIOLOGY | Age: 47
End: 2017-12-14

## 2017-12-14 NOTE — TELEPHONE ENCOUNTER
Pt called in and wanted Dr Shoaib Metz to know he has restarted his Livalo. He had stated that his abdominal pains did not go away when he was off of it for a week, so he resumed Livalo on Tuesday night. He stated he is following up with a surgeon to do some test for his abdominal pain. Let Dr Shoaib Metz know and Dr Shoaib Metz verbalized understanding.

## 2018-01-03 ENCOUNTER — HOSPITAL ENCOUNTER (EMERGENCY)
Age: 48
Discharge: HOME | End: 2018-01-03
Attending: EMERGENCY MEDICINE
Payer: COMMERCIAL

## 2018-01-03 ENCOUNTER — APPOINTMENT (OUTPATIENT)
Dept: CT IMAGING | Age: 48
End: 2018-01-03
Payer: COMMERCIAL

## 2018-01-03 VITALS
SYSTOLIC BLOOD PRESSURE: 135 MMHG | TEMPERATURE: 97.2 F | DIASTOLIC BLOOD PRESSURE: 93 MMHG | OXYGEN SATURATION: 97 % | HEART RATE: 101 BPM

## 2018-01-03 DIAGNOSIS — R10.9 RIGHT FLANK PAIN: ICD-10-CM

## 2018-01-03 DIAGNOSIS — R30.0 DYSURIA: Primary | ICD-10-CM

## 2018-01-03 LAB
-: ABNORMAL
ABSOLUTE EOS #: 0.6 K/UL (ref 0–0.4)
ABSOLUTE IMMATURE GRANULOCYTE: ABNORMAL K/UL (ref 0–0.3)
ABSOLUTE LYMPH #: 3.4 K/UL (ref 1–4.8)
ABSOLUTE MONO #: 1 K/UL (ref 0–1)
AMORPHOUS: ABNORMAL
ANION GAP SERPL CALCULATED.3IONS-SCNC: 16 MMOL/L (ref 9–17)
BACTERIA: ABNORMAL
BASOPHILS # BLD: 1 % (ref 0–2)
BASOPHILS ABSOLUTE: 0.1 K/UL (ref 0–0.2)
BILIRUBIN URINE: NEGATIVE
BUN BLDV-MCNC: 15 MG/DL (ref 6–20)
BUN/CREAT BLD: 15 (ref 9–20)
CALCIUM SERPL-MCNC: 9.8 MG/DL (ref 8.6–10.4)
CASTS UA: ABNORMAL /LPF
CHLORIDE BLD-SCNC: 98 MMOL/L (ref 98–107)
CO2: 24 MMOL/L (ref 20–31)
COLOR: YELLOW
COMMENT UA: ABNORMAL
CREAT SERPL-MCNC: 1.02 MG/DL (ref 0.7–1.2)
CRYSTALS, UA: ABNORMAL /HPF
DIFFERENTIAL TYPE: ABNORMAL
EOSINOPHILS RELATIVE PERCENT: 5 % (ref 0–8)
EPITHELIAL CELLS UA: ABNORMAL /HPF (ref 0–5)
GFR AFRICAN AMERICAN: >60 ML/MIN
GFR NON-AFRICAN AMERICAN: >60 ML/MIN
GFR SERPL CREATININE-BSD FRML MDRD: ABNORMAL ML/MIN/{1.73_M2}
GFR SERPL CREATININE-BSD FRML MDRD: ABNORMAL ML/MIN/{1.73_M2}
GLUCOSE BLD-MCNC: 123 MG/DL (ref 70–99)
GLUCOSE URINE: NEGATIVE
HCT VFR BLD CALC: 51.4 % (ref 41–53)
HEMOGLOBIN: 17 G/DL (ref 13.5–17)
IMMATURE GRANULOCYTES: ABNORMAL %
KETONES, URINE: NEGATIVE
LEUKOCYTE ESTERASE, URINE: NEGATIVE
LYMPHOCYTES # BLD: 29 % (ref 24–44)
MCH RBC QN AUTO: 28.9 PG (ref 26–34)
MCHC RBC AUTO-ENTMCNC: 33.1 G/DL (ref 31–37)
MCV RBC AUTO: 87.4 FL (ref 80–100)
MONOCYTES # BLD: 9 % (ref 0–12)
MUCUS: ABNORMAL
NITRITE, URINE: NEGATIVE
OTHER OBSERVATIONS UA: ABNORMAL
PDW BLD-RTO: 13.8 % (ref 12.1–15.2)
PH UA: 5.5 (ref 5–9)
PLATELET # BLD: 269 K/UL (ref 140–450)
PLATELET ESTIMATE: ABNORMAL
PMV BLD AUTO: 8.4 FL (ref 6–12)
POTASSIUM SERPL-SCNC: 4.1 MMOL/L (ref 3.7–5.3)
PROTEIN UA: NEGATIVE
RBC # BLD: 5.88 M/UL (ref 4.5–5.9)
RBC # BLD: ABNORMAL 10*6/UL
RBC UA: ABNORMAL /HPF (ref 0–2)
RENAL EPITHELIAL, UA: ABNORMAL /HPF
SEG NEUTROPHILS: 56 % (ref 36–66)
SEGMENTED NEUTROPHILS ABSOLUTE COUNT: 6.5 K/UL (ref 1.8–7.7)
SODIUM BLD-SCNC: 138 MMOL/L (ref 135–144)
SPECIFIC GRAVITY UA: 1.02 (ref 1.01–1.02)
TRICHOMONAS: ABNORMAL
TURBIDITY: CLEAR
URINE HGB: NEGATIVE
UROBILINOGEN, URINE: NORMAL
WBC # BLD: 11.6 K/UL (ref 3.5–11)
WBC # BLD: ABNORMAL 10*3/UL
WBC UA: ABNORMAL /HPF (ref 0–5)
YEAST: ABNORMAL

## 2018-01-03 PROCEDURE — 6360000002 HC RX W HCPCS: Performed by: EMERGENCY MEDICINE

## 2018-01-03 PROCEDURE — 85025 COMPLETE CBC W/AUTO DIFF WBC: CPT

## 2018-01-03 PROCEDURE — 81001 URINALYSIS AUTO W/SCOPE: CPT

## 2018-01-03 PROCEDURE — 96361 HYDRATE IV INFUSION ADD-ON: CPT

## 2018-01-03 PROCEDURE — 99284 EMERGENCY DEPT VISIT MOD MDM: CPT

## 2018-01-03 PROCEDURE — 96374 THER/PROPH/DIAG INJ IV PUSH: CPT

## 2018-01-03 PROCEDURE — 2580000003 HC RX 258: Performed by: EMERGENCY MEDICINE

## 2018-01-03 PROCEDURE — 96375 TX/PRO/DX INJ NEW DRUG ADDON: CPT

## 2018-01-03 PROCEDURE — 74176 CT ABD & PELVIS W/O CONTRAST: CPT

## 2018-01-03 PROCEDURE — 80048 BASIC METABOLIC PNL TOTAL CA: CPT

## 2018-01-03 RX ORDER — ONDANSETRON 2 MG/ML
4 INJECTION INTRAMUSCULAR; INTRAVENOUS ONCE
Status: COMPLETED | OUTPATIENT
Start: 2018-01-03 | End: 2018-01-03

## 2018-01-03 RX ORDER — MORPHINE SULFATE 2 MG/ML
4 INJECTION, SOLUTION INTRAMUSCULAR; INTRAVENOUS ONCE
Status: COMPLETED | OUTPATIENT
Start: 2018-01-03 | End: 2018-01-03

## 2018-01-03 RX ADMIN — MORPHINE SULFATE 2 MG: 2 INJECTION, SOLUTION INTRAMUSCULAR; INTRAVENOUS at 03:30

## 2018-01-03 RX ADMIN — SODIUM CHLORIDE 999 ML: 9 INJECTION, SOLUTION INTRAVENOUS at 03:31

## 2018-01-03 RX ADMIN — ONDANSETRON 4 MG: 2 INJECTION INTRAMUSCULAR; INTRAVENOUS at 03:30

## 2018-01-03 ASSESSMENT — PAIN SCALES - GENERAL: PAINLEVEL_OUTOF10: 5

## 2018-01-03 ASSESSMENT — PAIN DESCRIPTION - PAIN TYPE: TYPE: ACUTE PAIN

## 2018-01-03 NOTE — ED PROVIDER NOTES
HPI  Right flank pain dysuria for last few days. Also some suprapubic cramping. Several days ago patient had left inguinal hernia repair  History of kidney stones has some nausea  Review of Systems   All other systems reviewed and are negative. No vomiting no diarrhea no fevers no chills able to eat and take fluids fine  Physical Exam   Constitutional: He is oriented to person, place, and time. He appears well-developed and well-nourished. Nontoxic appearance no distress   HENT:   Head: Normocephalic and atraumatic. Eyes: Conjunctivae are normal. Pupils are equal, round, and reactive to light. Neck: Normal range of motion. Neck supple. Cardiovascular: Normal rate, regular rhythm and normal heart sounds. No murmur heard. Pulmonary/Chest: Effort normal and breath sounds normal.   Abdominal: Soft. Bowel sounds are normal. He exhibits no distension. There is no tenderness. Right flank tenderness   Musculoskeletal: Normal range of motion. Neurological: He is alert and oriented to person, place, and time. Skin: Skin is warm and dry. Psychiatric: He has a normal mood and affect. His behavior is normal.   Nursing note and vitals reviewed. Procedures    MDM  Right flank pain dysuria  ED Course      Pain controlled gradually improving condition  he has some pain medication at home he was he is willing to take. Didn't take before. Pain appears to be most likely just musculoskeletal  Labs  Unremarkable    Radiology  Negative    EKG Interpretation.       Summation      Patient Course:  Family physician in 2 days for recheck    ED Medications administered this visit:    Medications   0.9 % NaCl bolus (0 mLs Intravenous Stopped 1/3/18 6951)   ondansetron (ZOFRAN) injection 4 mg (4 mg Intravenous Given 1/3/18 6180)   morphine injection 4 mg (2 mg Intravenous Given 1/3/18 0330)       New Prescriptions from this visit:    New Prescriptions    No medications on file       Follow-up:  Massimo Schmidt

## 2018-05-04 ENCOUNTER — HOSPITAL ENCOUNTER (OUTPATIENT)
Dept: LAB | Age: 48
Discharge: HOME | End: 2018-05-04
Payer: COMMERCIAL

## 2018-05-04 DIAGNOSIS — E16.2 HYPOGLYCEMIA: ICD-10-CM

## 2018-05-04 LAB
AMOUNT GLUCOSE GIVEN: 100 G
GLUCOSE BLD-MCNC: 114 MG/DL (ref 74–100)
GLUCOSE FASTING: 117 MG/DL (ref 65–99)
GLUCOSE TOLERANCE TEST 1 HOUR: 197 MG/DL (ref 65–184)
GLUCOSE TOLERANCE TEST 2 HOUR: 131 MG/DL (ref 65–139)
GLUCOSE TOLERANCE TEST 3 HOUR: 152 MG/DL (ref 65–130)
GLUCOSE, 4 HOUR: 113 MG/DL (ref 65–125)
GLUCOSE, 5 HR: 66 MG/DL (ref 65–109)

## 2018-05-04 PROCEDURE — 82947 ASSAY GLUCOSE BLOOD QUANT: CPT

## 2018-05-04 PROCEDURE — 36415 COLL VENOUS BLD VENIPUNCTURE: CPT

## 2018-05-04 PROCEDURE — 82952 GTT-ADDED SAMPLES: CPT

## 2018-05-04 PROCEDURE — 82951 GLUCOSE TOLERANCE TEST (GTT): CPT

## 2018-06-01 ENCOUNTER — HOSPITAL ENCOUNTER (OUTPATIENT)
Dept: NUTRITION | Age: 48
Discharge: HOME | End: 2018-06-01

## 2018-06-01 NOTE — PROGRESS NOTES
This is a notice of failure to show for a medical nutrition therapy appointment. Sherrie Strickland had an appointment with the dietitian this date, but did not show, cancel or reschedule before the appointment time. If an appointment is still desired, please contact [facility] scheduling [phone].     Thank you for the referral.    Electronically signed by Annika Munoz RD, LD on 6/1/2018 at 5:23 PM

## 2018-06-29 ENCOUNTER — HOSPITAL ENCOUNTER (OUTPATIENT)
Dept: NUTRITION | Age: 48
Discharge: HOME | End: 2018-06-29
Payer: COMMERCIAL

## 2018-06-29 PROCEDURE — 97802 MEDICAL NUTRITION INDIV IN: CPT

## 2018-07-02 VITALS — BODY MASS INDEX: 34.79 KG/M2 | HEIGHT: 70 IN | WEIGHT: 243 LBS

## 2018-07-30 ENCOUNTER — HOSPITAL ENCOUNTER (OUTPATIENT)
Age: 48
Discharge: HOME | End: 2018-08-01
Payer: COMMERCIAL

## 2018-07-30 ENCOUNTER — HOSPITAL ENCOUNTER (OUTPATIENT)
Dept: GENERAL RADIOLOGY | Age: 48
Discharge: HOME | End: 2018-08-01
Payer: COMMERCIAL

## 2018-07-30 DIAGNOSIS — G89.29 CHRONIC RIGHT-SIDED LOW BACK PAIN WITH RIGHT-SIDED SCIATICA: ICD-10-CM

## 2018-07-30 DIAGNOSIS — M54.41 CHRONIC RIGHT-SIDED LOW BACK PAIN WITH RIGHT-SIDED SCIATICA: ICD-10-CM

## 2018-07-30 PROCEDURE — 72110 X-RAY EXAM L-2 SPINE 4/>VWS: CPT

## 2018-10-15 RX ORDER — METOPROLOL SUCCINATE 25 MG/1
25 TABLET, EXTENDED RELEASE ORAL 2 TIMES DAILY
Qty: 180 TABLET | Refills: 3 | Status: SHIPPED | OUTPATIENT
Start: 2018-10-15 | End: 2019-10-24

## 2018-11-07 ENCOUNTER — HOSPITAL ENCOUNTER (OUTPATIENT)
Age: 48
Discharge: HOME | End: 2018-11-07
Payer: COMMERCIAL

## 2018-11-07 DIAGNOSIS — R10.9 RIGHT LATERAL ABDOMINAL PAIN: ICD-10-CM

## 2018-11-07 LAB
-: ABNORMAL
ABSOLUTE EOS #: 0.18 K/UL (ref 0–0.44)
ABSOLUTE IMMATURE GRANULOCYTE: <0.03 K/UL (ref 0–0.3)
ABSOLUTE LYMPH #: 3.14 K/UL (ref 1.1–3.7)
ABSOLUTE MONO #: 0.73 K/UL (ref 0.1–1.2)
ALBUMIN SERPL-MCNC: 4.6 G/DL (ref 3.5–5.2)
ALBUMIN/GLOBULIN RATIO: 1.7 (ref 1–2.5)
ALP BLD-CCNC: 71 U/L (ref 40–129)
ALT SERPL-CCNC: 73 U/L (ref 5–41)
AMORPHOUS: ABNORMAL
ANION GAP SERPL CALCULATED.3IONS-SCNC: 14 MMOL/L (ref 9–17)
AST SERPL-CCNC: 38 U/L
BACTERIA: ABNORMAL
BASOPHILS # BLD: 1 % (ref 0–2)
BASOPHILS ABSOLUTE: 0.06 K/UL (ref 0–0.2)
BILIRUB SERPL-MCNC: 0.45 MG/DL (ref 0.3–1.2)
BILIRUBIN URINE: NEGATIVE
BUN BLDV-MCNC: 13 MG/DL (ref 6–20)
BUN/CREAT BLD: 14 (ref 9–20)
CALCIUM SERPL-MCNC: 9.3 MG/DL (ref 8.6–10.4)
CASTS UA: ABNORMAL /LPF
CHLORIDE BLD-SCNC: 101 MMOL/L (ref 98–107)
CO2: 22 MMOL/L (ref 20–31)
COLOR: YELLOW
COMMENT UA: ABNORMAL
CREAT SERPL-MCNC: 0.93 MG/DL (ref 0.7–1.2)
CRYSTALS, UA: ABNORMAL /HPF
DIFFERENTIAL TYPE: NORMAL
EOSINOPHILS RELATIVE PERCENT: 2 % (ref 1–4)
EPITHELIAL CELLS UA: ABNORMAL /HPF (ref 0–5)
GFR AFRICAN AMERICAN: >60 ML/MIN
GFR NON-AFRICAN AMERICAN: >60 ML/MIN
GFR SERPL CREATININE-BSD FRML MDRD: ABNORMAL ML/MIN/{1.73_M2}
GFR SERPL CREATININE-BSD FRML MDRD: ABNORMAL ML/MIN/{1.73_M2}
GLUCOSE BLD-MCNC: 88 MG/DL (ref 70–99)
GLUCOSE URINE: NEGATIVE
HCT VFR BLD CALC: 48.8 % (ref 40.7–50.3)
HEMOGLOBIN: 16 G/DL (ref 13–17)
IMMATURE GRANULOCYTES: 0 %
KETONES, URINE: ABNORMAL
LEUKOCYTE ESTERASE, URINE: NEGATIVE
LYMPHOCYTES # BLD: 36 % (ref 24–43)
MCH RBC QN AUTO: 29.7 PG (ref 25.2–33.5)
MCHC RBC AUTO-ENTMCNC: 32.8 G/DL (ref 28.4–34.8)
MCV RBC AUTO: 90.5 FL (ref 82.6–102.9)
MONOCYTES # BLD: 8 % (ref 3–12)
MUCUS: ABNORMAL
NITRITE, URINE: NEGATIVE
NRBC AUTOMATED: 0 PER 100 WBC
OTHER OBSERVATIONS UA: ABNORMAL
PDW BLD-RTO: 13.2 % (ref 11.8–14.4)
PH UA: 5.5 (ref 5–9)
PLATELET # BLD: 250 K/UL (ref 138–453)
PLATELET ESTIMATE: NORMAL
PMV BLD AUTO: 9.9 FL (ref 8.1–13.5)
POTASSIUM SERPL-SCNC: 4.1 MMOL/L (ref 3.7–5.3)
PROTEIN UA: NEGATIVE
RBC # BLD: 5.39 M/UL (ref 4.21–5.77)
RBC # BLD: NORMAL 10*6/UL
RBC UA: ABNORMAL /HPF (ref 0–2)
RENAL EPITHELIAL, UA: ABNORMAL /HPF
SEG NEUTROPHILS: 53 % (ref 36–65)
SEGMENTED NEUTROPHILS ABSOLUTE COUNT: 4.69 K/UL (ref 1.5–8.1)
SODIUM BLD-SCNC: 137 MMOL/L (ref 135–144)
SPECIFIC GRAVITY UA: >1.03 (ref 1.01–1.02)
TOTAL PROTEIN: 7.3 G/DL (ref 6.4–8.3)
TRICHOMONAS: ABNORMAL
TURBIDITY: CLEAR
URINE HGB: NEGATIVE
UROBILINOGEN, URINE: NORMAL
WBC # BLD: 8.8 K/UL (ref 3.5–11.3)
WBC # BLD: NORMAL 10*3/UL
WBC UA: ABNORMAL /HPF (ref 0–5)
YEAST: ABNORMAL

## 2018-11-07 PROCEDURE — 85025 COMPLETE CBC W/AUTO DIFF WBC: CPT

## 2018-11-07 PROCEDURE — 80053 COMPREHEN METABOLIC PANEL: CPT

## 2018-11-07 PROCEDURE — 81001 URINALYSIS AUTO W/SCOPE: CPT

## 2018-11-07 PROCEDURE — 36415 COLL VENOUS BLD VENIPUNCTURE: CPT

## 2018-11-11 ENCOUNTER — HOSPITAL ENCOUNTER (EMERGENCY)
Age: 48
Discharge: HOME | End: 2018-11-12
Attending: EMERGENCY MEDICINE
Payer: COMMERCIAL

## 2018-11-11 ENCOUNTER — APPOINTMENT (OUTPATIENT)
Dept: GENERAL RADIOLOGY | Age: 48
End: 2018-11-11
Payer: COMMERCIAL

## 2018-11-11 VITALS
RESPIRATION RATE: 16 BRPM | HEART RATE: 83 BPM | TEMPERATURE: 97.7 F | DIASTOLIC BLOOD PRESSURE: 99 MMHG | SYSTOLIC BLOOD PRESSURE: 130 MMHG | OXYGEN SATURATION: 96 %

## 2018-11-11 DIAGNOSIS — R05.9 COUGH: Primary | ICD-10-CM

## 2018-11-11 LAB
ANION GAP SERPL CALCULATED.3IONS-SCNC: 10 MMOL/L (ref 9–17)
BNP INTERPRETATION: NORMAL
BUN BLDV-MCNC: 14 MG/DL (ref 6–20)
BUN/CREAT BLD: 11 (ref 9–20)
CALCIUM SERPL-MCNC: 9 MG/DL (ref 8.6–10.4)
CHLORIDE BLD-SCNC: 102 MMOL/L (ref 98–107)
CO2: 26 MMOL/L (ref 20–31)
CREAT SERPL-MCNC: 1.32 MG/DL (ref 0.7–1.2)
EKG ATRIAL RATE: 78 BPM
EKG P AXIS: 53 DEGREES
EKG P-R INTERVAL: 150 MS
EKG Q-T INTERVAL: 376 MS
EKG QRS DURATION: 78 MS
EKG QTC CALCULATION (BAZETT): 428 MS
EKG R AXIS: 2 DEGREES
EKG T AXIS: 50 DEGREES
EKG VENTRICULAR RATE: 78 BPM
GFR AFRICAN AMERICAN: >60 ML/MIN
GFR NON-AFRICAN AMERICAN: 58 ML/MIN
GFR SERPL CREATININE-BSD FRML MDRD: ABNORMAL ML/MIN/{1.73_M2}
GFR SERPL CREATININE-BSD FRML MDRD: ABNORMAL ML/MIN/{1.73_M2}
GLUCOSE BLD-MCNC: 107 MG/DL (ref 70–99)
HCT VFR BLD CALC: 49.3 % (ref 40.7–50.3)
HEMOGLOBIN: 16 G/DL (ref 13–17)
MCH RBC QN AUTO: 29.6 PG (ref 25.2–33.5)
MCHC RBC AUTO-ENTMCNC: 32.5 G/DL (ref 28.4–34.8)
MCV RBC AUTO: 91.1 FL (ref 82.6–102.9)
NRBC AUTOMATED: 0 PER 100 WBC
PDW BLD-RTO: 13.2 % (ref 11.8–14.4)
PLATELET # BLD: 220 K/UL (ref 138–453)
PMV BLD AUTO: 10 FL (ref 8.1–13.5)
POTASSIUM SERPL-SCNC: 4 MMOL/L (ref 3.7–5.3)
PRO-BNP: 88 PG/ML
RBC # BLD: 5.41 M/UL (ref 4.21–5.77)
SODIUM BLD-SCNC: 138 MMOL/L (ref 135–144)
TROPONIN INTERP: NORMAL
TROPONIN T: <0.03 NG/ML
WBC # BLD: 11.3 K/UL (ref 3.5–11.3)

## 2018-11-11 PROCEDURE — 93005 ELECTROCARDIOGRAM TRACING: CPT

## 2018-11-11 PROCEDURE — 71046 X-RAY EXAM CHEST 2 VIEWS: CPT

## 2018-11-11 PROCEDURE — 80048 BASIC METABOLIC PNL TOTAL CA: CPT

## 2018-11-11 PROCEDURE — 84484 ASSAY OF TROPONIN QUANT: CPT

## 2018-11-11 PROCEDURE — 83880 ASSAY OF NATRIURETIC PEPTIDE: CPT

## 2018-11-11 PROCEDURE — 99284 EMERGENCY DEPT VISIT MOD MDM: CPT

## 2018-11-11 PROCEDURE — 85027 COMPLETE CBC AUTOMATED: CPT

## 2018-11-12 PROCEDURE — 6370000000 HC RX 637 (ALT 250 FOR IP): Performed by: EMERGENCY MEDICINE

## 2018-11-12 RX ORDER — BENZONATATE 100 MG/1
100 CAPSULE ORAL ONCE
Status: COMPLETED | OUTPATIENT
Start: 2018-11-12 | End: 2018-11-12

## 2018-11-12 RX ORDER — BENZONATATE 100 MG/1
100 CAPSULE ORAL 2 TIMES DAILY PRN
Qty: 30 CAPSULE | Refills: 0 | Status: SHIPPED | OUTPATIENT
Start: 2018-11-12 | End: 2018-11-19

## 2018-11-12 RX ADMIN — BENZONATATE 100 MG: 100 CAPSULE ORAL at 00:40

## 2018-11-12 NOTE — ED PROVIDER NOTES
stenosis. LAD: Mild irregularities 10-20%. LCx: Mild irregularities 20-30%. RCA: Normal 0% stenosis. small caliber vessel. EF 55%.  CHOLECYSTECTOMY  Aug 2011    COLONOSCOPY      ENDOSCOPY, COLON, DIAGNOSTIC      HEMORRHOID SURGERY  2007 or 2008    HERNIA REPAIR      Aug.2011    HERNIA REPAIR      TONSILLECTOMY      age 23       CURRENT MEDICATIONS    Current Outpatient Rx   Medication Sig Dispense Refill    benzonatate (TESSALON) 100 MG capsule Take 1 capsule by mouth 2 times daily as needed for Cough 30 capsule 0    naproxen (NAPROSYN) 500 MG tablet Take 1 tablet by mouth 2 times daily (with meals) 30 tablet 0    metoprolol succinate (TOPROL XL) 25 MG extended release tablet Take 1 tablet by mouth 2 times daily 180 tablet 3    omeprazole (PRILOSEC) 40 MG delayed release capsule TAKE 1 CAPSULE BY MOUTH ONCE DAILY 30 capsule 5    midodrine (PROAMATINE) 10 MG tablet Take 1 tablet by mouth 2 times daily 180 tablet 3    pitavastatin (LIVALO) 1 MG TABS tablet Take 1 tablet by mouth nightly 90 tablet 3    Omega-3 Fatty Acids (FISH OIL) 1000 MG CAPS Take 1,000 mg by mouth daily      Sennosides (SENNA LAXATIVE PO) Take 2 capsules by mouth      aspirin EC 81 MG EC tablet Take 1 tablet by mouth daily 30 tablet 3    fluocinonide (LIDEX) 0.05 % cream APPLY TO AFFECTED AREAS 2 TIMES A DAY.  60 g 0    fluticasone (FLONASE) 50 MCG/ACT nasal spray 2 sprays by Nasal route daily 1 Bottle 5    budesonide-formoterol (SYMBICORT) 160-4.5 MCG/ACT AERO Inhale 2 puffs into the lungs 2 times daily 1 Inhaler 3       ALLERGIES    No Known Allergies    FAMILY HISTORY  Family History   Problem Relation Age of Onset    Cancer Mother         colon ca at age 79   Edmond Brunette Depression Mother     Colon Cancer Mother     Cancer Father         lung ca age 78    COPD Father     Heart Disease Father     Lung Cancer Father     Heart Defect Sister         or as a baby    Stroke Maternal Grandmother     Other Paternal Grandmother tremors    Heart Disease Paternal Grandfather        SOCIAL HISTORY    Social History     Social History    Marital status:      Spouse name: N/A    Number of children: N/A    Years of education: N/A     Occupational History          Social History Main Topics    Smoking status: Never Smoker    Smokeless tobacco: Never Used    Alcohol use No    Drug use: No    Sexual activity: Not Asked     Other Topics Concern    None     Social History Narrative    None       REVIEW OF SYSTEMS    Review of Systems    Pertinent ROS as noted above, andper HPI. The remainder of the review ofsystems was reviewed and is negative. PHYSICAL EXAM    VITAL SIGNS: BP (!) 130/99   Pulse 83   Temp 97.7 °F (36.5 °C) (Tympanic)   Resp 16   SpO2 96%    Physical Exam  Constitutional:  No acute distress, Non-toxic appearance. HENT:  Normocephalic, Atraumatic, Neck- Normal range of motion. Eyes:  Conjunctiva normal, No discharge. Respiratory:  Normal breath sounds, No respiratory distress, No chest tenderness. Cardiovascular:  Normal heart rate, Normal rhythm. GI:  Bowel sounds normal, Soft, No tenderness. Musculoskeletal:  Intact distal pulses, No edema, No tenderness. Integument:  Warm, Dry, No rash. Lymphatic:  No lymphadenopathy noted. Neurologic:  Alert & oriented x 3. SCREENINGS   Destiny Coma Scale  Eye Opening: Spontaneous  Best Verbal Response: Oriented  Best Motor Response: Obeys commands  Destiny Coma Scale Score: 15      EKG    Read at 2248, normal sinus rhythm with sinus arrhythmia, rate 78, normal HI and QRS duration, normal axis, no ST segment or T-wave changes. PROCEDURES    Procedures    RADIOLOGY    XR CHEST STANDARD (2 VW)   Preliminary Result   No acute cardiopulmonary disease.              Labs  Labs Reviewed   BASIC METABOLIC PANEL - Abnormal; Notable for the following:        Result Value    Glucose 107 (*)     CREATININE 1.32 (*)     GFR Non-

## 2018-11-14 ENCOUNTER — HOSPITAL ENCOUNTER (OUTPATIENT)
Dept: CT IMAGING | Age: 48
Discharge: HOME | End: 2018-11-16
Payer: COMMERCIAL

## 2018-11-14 DIAGNOSIS — R10.9 RIGHT LATERAL ABDOMINAL PAIN: ICD-10-CM

## 2018-11-14 PROCEDURE — 74178 CT ABD&PLV WO CNTR FLWD CNTR: CPT

## 2018-11-14 PROCEDURE — 6360000004 HC RX CONTRAST MEDICATION: Performed by: INTERNAL MEDICINE

## 2018-11-14 RX ADMIN — IOHEXOL 50 ML: 240 INJECTION, SOLUTION INTRATHECAL; INTRAVASCULAR; INTRAVENOUS; ORAL at 18:44

## 2018-11-14 RX ADMIN — IOPAMIDOL 75 ML: 755 INJECTION, SOLUTION INTRAVENOUS at 18:43

## 2018-11-26 ENCOUNTER — OFFICE VISIT (OUTPATIENT)
Dept: CARDIOLOGY | Age: 48
End: 2018-11-26
Payer: COMMERCIAL

## 2018-11-26 VITALS
OXYGEN SATURATION: 96 % | SYSTOLIC BLOOD PRESSURE: 114 MMHG | BODY MASS INDEX: 34.36 KG/M2 | HEIGHT: 70 IN | DIASTOLIC BLOOD PRESSURE: 82 MMHG | RESPIRATION RATE: 18 BRPM | HEART RATE: 91 BPM | WEIGHT: 240 LBS

## 2018-11-26 DIAGNOSIS — E78.2 MIXED HYPERLIPIDEMIA: ICD-10-CM

## 2018-11-26 DIAGNOSIS — I25.84 CORONARY ARTERY CALCIFICATION OF NATIVE ARTERY: Primary | ICD-10-CM

## 2018-11-26 DIAGNOSIS — E66.9 OBESITY (BMI 30.0-34.9): ICD-10-CM

## 2018-11-26 DIAGNOSIS — I25.10 CORONARY ARTERY CALCIFICATION OF NATIVE ARTERY: Primary | ICD-10-CM

## 2018-11-26 DIAGNOSIS — G47.33 OBSTRUCTIVE SLEEP APNEA: ICD-10-CM

## 2018-11-26 DIAGNOSIS — R55 PRE-SYNCOPE: ICD-10-CM

## 2018-11-26 PROCEDURE — 99214 OFFICE O/P EST MOD 30 MIN: CPT | Performed by: INTERNAL MEDICINE

## 2018-11-26 NOTE — PROGRESS NOTES
on 11/11/2018 with shortness of breath. He did mention some intermittent chest achiness however it goes away in seconds and there is no sweating or diaphoresis or radiation. No orthopnea or PND. No palpitations. No change in his weight. No problems with medications. Still very resistant to try CPAP    ECG done on (11/11/2018)- Normal sinus rhythm with sinus arrhythmia. Normal ECG. When compared with ECG of 06-OCT-2017 16:13, no significant change was found    Past Medical History:    Past Medical History:   Diagnosis Date    Anxiety     Asthma     Brain lesion     x2    Bronchitis jan. 2013    Chronic kidney disease     kidney stone    CPAP (continuous positive airway pressure) dependence     Depression     Esophageal reflux     Essential and other specified forms of tremor     GERD (gastroesophageal reflux disease)     H/O echocardiogram 01/05/2016    EF 55%. Mild LV hypertrophy. Mild diastolic dysfunction is seen.  Herniated disc     History of Holter monitoring 09/20/2016    sinus rhythm with sinus tachycardia 27% of the study duration,average HR 90 bpm ranging between 51 - 147 bpm. Rare isolated PAC's and PVC's    History of stress test 01/05/2016    Relatively normal.  EF 63%. Moise Treadmill score is 7, Low risk for CAD    Hx of tilt table evaluation 08/15/2016    Abnormal.  Heart rate,blood pressure response and symptoms were most consistent with dysautonomia.  Hyperlipidemia     Hypertension     Impaired fasting glucose     Kidney stone     Liver disease     fatty liver    Obstructive sleep apnea (adult) (pediatric)     Other and unspecified hyperlipidemia      Past Surgical History:  Past Surgical History:   Procedure Laterality Date    BLADDER STONE REMOVAL  1996    CARDIAC CATHETERIZATION Left 05/12/2017    via right radial approach/ Ashley Pisano/ Dr. Jaime Hall. LMCA: Normal 0%. stenosis. LAD: Mild irregularities 10-20%. LCx: Mild irregularities 20-30%. RCA: Normal 0% stenosis. he had any problems, but otherwise told him to Return in about 1 year (around 11/26/2019) for Follow up. However, I would be happy to see him sooner should the need arise. I believe that the risk of significant morbidity and mortality related to the patient's current medical conditions are: Intermediate. 25 minutes were spent with the patient and all of his questions were answered. The documentation recorded by the scribe, accurately and completely reflects the services I personally performed and the decisions made by me. Juliana Rodriguez MD, F.A.C.C.  November 26, 2018

## 2018-12-12 ENCOUNTER — TELEPHONE (OUTPATIENT)
Dept: CARDIOLOGY | Age: 48
End: 2018-12-12

## 2018-12-12 ENCOUNTER — HOSPITAL ENCOUNTER (OUTPATIENT)
Dept: NON INVASIVE DIAGNOSTICS | Age: 48
Discharge: HOME | End: 2018-12-12
Payer: COMMERCIAL

## 2018-12-12 DIAGNOSIS — G47.33 OBSTRUCTIVE SLEEP APNEA: ICD-10-CM

## 2018-12-12 DIAGNOSIS — E78.2 MIXED HYPERLIPIDEMIA: ICD-10-CM

## 2018-12-12 DIAGNOSIS — R55 PRE-SYNCOPE: ICD-10-CM

## 2018-12-12 LAB
LV EF: 60 %
LVEF MODALITY: NORMAL

## 2018-12-12 PROCEDURE — 93306 TTE W/DOPPLER COMPLETE: CPT

## 2018-12-13 ENCOUNTER — TELEPHONE (OUTPATIENT)
Dept: CARDIOLOGY | Age: 48
End: 2018-12-13

## 2018-12-21 RX ORDER — MIDODRINE HYDROCHLORIDE 10 MG/1
10 TABLET ORAL 2 TIMES DAILY
Qty: 180 TABLET | Refills: 3 | Status: SHIPPED | OUTPATIENT
Start: 2018-12-21 | End: 2019-11-26 | Stop reason: SDUPTHER

## 2019-02-23 ENCOUNTER — HOSPITAL ENCOUNTER (EMERGENCY)
Age: 49
Discharge: HOME | End: 2019-02-23
Attending: EMERGENCY MEDICINE
Payer: COMMERCIAL

## 2019-02-23 ENCOUNTER — APPOINTMENT (OUTPATIENT)
Dept: CT IMAGING | Age: 49
End: 2019-02-23
Payer: COMMERCIAL

## 2019-02-23 VITALS
HEIGHT: 70 IN | DIASTOLIC BLOOD PRESSURE: 76 MMHG | WEIGHT: 235 LBS | HEART RATE: 70 BPM | TEMPERATURE: 98.1 F | OXYGEN SATURATION: 90 % | RESPIRATION RATE: 20 BRPM | BODY MASS INDEX: 33.64 KG/M2 | SYSTOLIC BLOOD PRESSURE: 109 MMHG

## 2019-02-23 DIAGNOSIS — R42 DIZZINESS: Primary | ICD-10-CM

## 2019-02-23 LAB
ALLEN TEST: NORMAL
CARBOXYHEMOGLOBIN: NORMAL % (ref 0–5)
FIO2: NORMAL
HCO3 ARTERIAL: 24.4 MMOL/L (ref 22–26)
METHEMOGLOBIN: NORMAL % (ref 0–1.9)
MODE: NORMAL
NEGATIVE BASE EXCESS, ART: 0.2 MMOL/L (ref 0–2)
NOTIFICATION TIME: NORMAL
NOTIFICATION: NORMAL
O2 DEVICE/FLOW/%: NORMAL
O2 SAT, ARTERIAL: 96.5 % (ref 95–98)
OXYHEMOGLOBIN: NORMAL % (ref 95–98)
PATIENT TEMP: NORMAL
PCO2 ARTERIAL: 39.6 MMHG (ref 35–45)
PCO2, ART, TEMP ADJ: NORMAL
PEEP/CPAP: NORMAL
PH ARTERIAL: 7.41 (ref 7.35–7.45)
PH, ART, TEMP ADJ: NORMAL (ref 7.35–7.45)
PO2 ARTERIAL: 84.8 MMHG (ref 80–100)
PO2, ART, TEMP ADJ: NORMAL MMHG (ref 80–100)
POSITIVE BASE EXCESS, ART: NORMAL MMOL/L (ref 0–2)
PSV: NORMAL
PT. POSITION: NORMAL
RESPIRATORY RATE: 16
SAMPLE SITE: NORMAL
SET RATE: NORMAL
TEXT FOR RESPIRATORY: NORMAL
TOTAL HB: NORMAL G/DL (ref 12–16)
TOTAL RATE: NORMAL
VT: NORMAL

## 2019-02-23 PROCEDURE — 70450 CT HEAD/BRAIN W/O DYE: CPT

## 2019-02-23 PROCEDURE — 82805 BLOOD GASES W/O2 SATURATION: CPT

## 2019-02-23 PROCEDURE — 6370000000 HC RX 637 (ALT 250 FOR IP): Performed by: EMERGENCY MEDICINE

## 2019-02-23 PROCEDURE — 99284 EMERGENCY DEPT VISIT MOD MDM: CPT

## 2019-02-23 RX ORDER — MECLIZINE HCL 12.5 MG/1
25 TABLET ORAL ONCE
Status: COMPLETED | OUTPATIENT
Start: 2019-02-23 | End: 2019-02-23

## 2019-02-23 RX ORDER — MECLIZINE HYDROCHLORIDE 25 MG/1
25 TABLET ORAL 3 TIMES DAILY PRN
Qty: 30 TABLET | Refills: 0 | Status: SHIPPED | OUTPATIENT
Start: 2019-02-23 | End: 2019-03-05

## 2019-02-23 RX ADMIN — MECLIZINE 25 MG: 12.5 TABLET ORAL at 06:21

## 2019-02-23 ASSESSMENT — PAIN DESCRIPTION - FREQUENCY: FREQUENCY: CONTINUOUS

## 2019-02-23 ASSESSMENT — PAIN SCALES - GENERAL: PAINLEVEL_OUTOF10: 1

## 2019-02-23 ASSESSMENT — PAIN DESCRIPTION - PAIN TYPE: TYPE: ACUTE PAIN

## 2019-02-23 ASSESSMENT — PAIN DESCRIPTION - LOCATION: LOCATION: HEAD

## 2019-02-23 ASSESSMENT — PAIN DESCRIPTION - DESCRIPTORS: DESCRIPTORS: ACHING

## 2019-03-08 ASSESSMENT — ENCOUNTER SYMPTOMS
RESPIRATORY NEGATIVE: 1
GASTROINTESTINAL NEGATIVE: 1
EYES NEGATIVE: 1

## 2019-05-17 ENCOUNTER — HOSPITAL ENCOUNTER (OUTPATIENT)
Dept: NON INVASIVE DIAGNOSTICS | Age: 49
Discharge: HOME | End: 2019-05-17
Payer: COMMERCIAL

## 2019-05-17 ENCOUNTER — HOSPITAL ENCOUNTER (OUTPATIENT)
Age: 49
Discharge: HOME | End: 2019-05-17
Payer: COMMERCIAL

## 2019-05-17 ENCOUNTER — OFFICE VISIT (OUTPATIENT)
Dept: CARDIOLOGY | Age: 49
End: 2019-05-17
Payer: COMMERCIAL

## 2019-05-17 VITALS
HEIGHT: 70 IN | BODY MASS INDEX: 35.68 KG/M2 | RESPIRATION RATE: 16 BRPM | DIASTOLIC BLOOD PRESSURE: 91 MMHG | WEIGHT: 249.2 LBS | OXYGEN SATURATION: 95 % | SYSTOLIC BLOOD PRESSURE: 148 MMHG | HEART RATE: 68 BPM

## 2019-05-17 DIAGNOSIS — I25.10 MILD CAD: ICD-10-CM

## 2019-05-17 DIAGNOSIS — E78.2 MIXED HYPERLIPIDEMIA: ICD-10-CM

## 2019-05-17 DIAGNOSIS — R07.89 ATYPICAL CHEST PAIN: Primary | ICD-10-CM

## 2019-05-17 DIAGNOSIS — G47.33 OSA (OBSTRUCTIVE SLEEP APNEA): ICD-10-CM

## 2019-05-17 DIAGNOSIS — Z91.14 NONCOMPLIANCE WITH CPAP TREATMENT: ICD-10-CM

## 2019-05-17 DIAGNOSIS — R07.89 ATYPICAL CHEST PAIN: ICD-10-CM

## 2019-05-17 DIAGNOSIS — E66.9 OBESITY, CLASS II, BMI 35-39.9: ICD-10-CM

## 2019-05-17 LAB
CHOLESTEROL/HDL RATIO: 3.6
CHOLESTEROL: 147 MG/DL
HCT VFR BLD CALC: 51.4 % (ref 40.7–50.3)
HDLC SERPL-MCNC: 41 MG/DL
HEMOGLOBIN: 17 G/DL (ref 13–17)
LDL CHOLESTEROL: 48 MG/DL (ref 0–130)
MCH RBC QN AUTO: 29.3 PG (ref 25.2–33.5)
MCHC RBC AUTO-ENTMCNC: 33.1 G/DL (ref 28.4–34.8)
MCV RBC AUTO: 88.5 FL (ref 82.6–102.9)
NRBC AUTOMATED: 0 PER 100 WBC
PDW BLD-RTO: 13.2 % (ref 11.8–14.4)
PLATELET # BLD: 298 K/UL (ref 138–453)
PMV BLD AUTO: 9.6 FL (ref 8.1–13.5)
RBC # BLD: 5.81 M/UL (ref 4.21–5.77)
TRIGL SERPL-MCNC: 290 MG/DL
TROPONIN INTERP: NORMAL
TROPONIN T: <0.03 NG/ML
TROPONIN, HIGH SENSITIVITY: NORMAL NG/L (ref 0–22)
VLDLC SERPL CALC-MCNC: ABNORMAL MG/DL (ref 1–30)
WBC # BLD: 9.7 K/UL (ref 3.5–11.3)

## 2019-05-17 PROCEDURE — A9500 TC99M SESTAMIBI: HCPCS | Performed by: INTERNAL MEDICINE

## 2019-05-17 PROCEDURE — 99214 OFFICE O/P EST MOD 30 MIN: CPT | Performed by: INTERNAL MEDICINE

## 2019-05-17 PROCEDURE — 80061 LIPID PANEL: CPT

## 2019-05-17 PROCEDURE — 84484 ASSAY OF TROPONIN QUANT: CPT

## 2019-05-17 PROCEDURE — 3430000000 HC RX DIAGNOSTIC RADIOPHARMACEUTICAL: Performed by: INTERNAL MEDICINE

## 2019-05-17 PROCEDURE — 36415 COLL VENOUS BLD VENIPUNCTURE: CPT

## 2019-05-17 PROCEDURE — 93017 CV STRESS TEST TRACING ONLY: CPT

## 2019-05-17 PROCEDURE — 85027 COMPLETE CBC AUTOMATED: CPT

## 2019-05-17 PROCEDURE — 78452 HT MUSCLE IMAGE SPECT MULT: CPT

## 2019-05-17 PROCEDURE — 93000 ELECTROCARDIOGRAM COMPLETE: CPT | Performed by: INTERNAL MEDICINE

## 2019-05-17 RX ADMIN — Medication 33.2 MILLICURIE: at 11:40

## 2019-05-17 RX ADMIN — Medication 10.8 MILLICURIE: at 10:00

## 2019-05-17 NOTE — PROGRESS NOTES
Jorge Alberto Harris am scribing for and in the presence of Siddharth Stoner MD, F.A.C.C..      Subjective:     CHIEF COMPLAINT / HPI:   Chief Complaint   Patient presents with    Palpitations     achey in chest, is more frequent now, heart feels like racing & got SOB. lightheaded/dizziness. Barrington Wiseman is 46 y.o. male with multiple medical problems as outlined below who initially presented for evaluation of chest pain and dizziness. History of negative stress test on 1/6/2016. Patient had CTA of aorta to rule out aortic aneurysm and found to have dense calcification of the left main coronary artery leading to cardiac catheterization which revealed mild CAD without any focal stenosis. No prior history of myocardial infarction or heart failure. History of anxiety/panic attacks. History of sleep apnea, not using CPAP. History of dyslipidemia and statin intolerance. Finally tolerating Livalo 1 mg daily, started in July 2017. Tilt done in 2016, was abnormal, Heart rate,blood pressure response and symptoms were most consistent with dysautonomia. Holter done in 2016, sinus rhythm with sinus tachycardia 27% of the study duration,average HR 90 bpm ranging between 51 - 147 bpm. Rare isolated PAC's and PVC's    ECG done on (11/11/2018)- Normal sinus rhythm with sinus arrhythmia. Normal ECG. When compared with ECG of 06-OCT-2017 16:13, no significant change was found    EKG done today in office 5/17/2019 that showed sinus rhythm with no acute ischemic changes. Mr. Charisma Thapa is here because of recent achy chest pain, he states he has always had it but it is happening more frequently within the last 3 months. It lasts a few minutes, and he delopes an ache/numb feeling down below his elbow. He does not know what triggers it. He does not break out in cold sweats. He is get hot flashes from the shoulders up and his face turns red. He states its happened 4 or 5 times over the last 3 months.      He does have frequent headaches, sometimes they last awhile, sometimes they last minutes. He will just be sitting and develop shortness of breath and start breathing very fast, happens at night in bed. It last happened about a week ago. His work does include physical activity such as climbing and lifting. He has lost some weight. He does not have any problems sleeping and is using his CPAP only intermittently. No problems with medications. He is not taking any Aspirin at this time \"said he forgets to take it\". A couple of months ago in the middle of the night, he thinks he got up to use the bathroom but his wife woke him up and he was laying flat on his back on the floor. He stated his father in law passed away 2 weeks ago, he does not know if his symptoms are anxiety related. Past Medical History:    Past Medical History:   Diagnosis Date    Anxiety     Asthma     Brain lesion     x2    Bronchitis jan. 2013    Chronic kidney disease     kidney stone    CPAP (continuous positive airway pressure) dependence     Depression     Esophageal reflux     Essential and other specified forms of tremor     GERD (gastroesophageal reflux disease)     H/O echocardiogram 01/05/2016    EF 55%. Mild LV hypertrophy. Mild diastolic dysfunction is seen.  Herniated disc     History of echocardiogram 12/12/2018    EF 60%. LV wall thickness is mildly increased. Mild pulmonic regurg.  History of Holter monitoring 09/20/2016    sinus rhythm with sinus tachycardia 27% of the study duration,average HR 90 bpm ranging between 51 - 147 bpm. Rare isolated PAC's and PVC's    History of stress test 01/05/2016    Relatively normal.  EF 63%. Moise Treadmill score is 7, Low risk for CAD    Hx of tilt table evaluation 08/15/2016    Abnormal.  Heart rate,blood pressure response and symptoms were most consistent with dysautonomia.     Hyperlipidemia     Hypertension     Impaired fasting glucose     Kidney stone     Liver disease     fatty liver    Obstructive sleep apnea (adult) (pediatric)     Other and unspecified hyperlipidemia      Past Surgical History:  Past Surgical History:   Procedure Laterality Date    BLADDER STONE REMOVAL  1996    CARDIAC CATHETERIZATION Left 05/12/2017    via right radial approach/ Ashley Pisano/ Dr. Darius Gonzalez. LMCA: Normal 0%. stenosis. LAD: Mild irregularities 10-20%. LCx: Mild irregularities 20-30%. RCA: Normal 0% stenosis. small caliber vessel. EF 55%.  CHOLECYSTECTOMY  Aug 2011    COLONOSCOPY      ENDOSCOPY, COLON, DIAGNOSTIC      HEMORRHOID SURGERY  2007 or 2008    HERNIA REPAIR      Aug.2011    HERNIA REPAIR      TONSILLECTOMY      age 23     Social History:    Social History     Tobacco Use   Smoking Status Never Smoker   Smokeless Tobacco Never Used     Current Medications:  Outpatient Medications Marked as Taking for the 5/17/19 encounter (Office Visit) with Weston Chris MD   Medication Sig Dispense Refill    omeprazole (PRILOSEC) 40 MG delayed release capsule TAKE 1 CAPSULE BY MOUTH ONCE DAILY 30 capsule 5    SYMBICORT 160-4.5 MCG/ACT AERO INHALE 2 PUFFS BY MOUTH 2 TIMES A DAY. 10.2 g 5    fluocinonide (LIDEX) 0.05 % cream APPLY TO AFFECTED AREAS 2 TIMES A DAY. 60 g 5    midodrine (PROAMATINE) 10 MG tablet Take 1 tablet by mouth 2 times daily 180 tablet 3    pitavastatin (LIVALO) 1 MG TABS tablet Take 1 tablet by mouth nightly 90 tablet 3    metoprolol succinate (TOPROL XL) 25 MG extended release tablet Take 1 tablet by mouth 2 times daily 180 tablet 3       REVIEW OF SYSTEMS:    CONSTITUTIONAL: No major weight gain or loss, fatigue, weakness, night sweats or fever. HEENT: No new vision difficulties or ringing in the ears. RESPIRATORY: See HPI  CARDIOVASCULAR: See HPI  GI: No nausea, vomiting, diarrhea, constipation, abdominal pain or changes in bowel habits. : No urinary frequency, urgency, incontinence hematuria or dysuria. SKIN: No cyanosis or skin lesions.   MUSCULOSKELETAL: No new muscle or joint pain. NEUROLOGICAL: No syncope or TIA-like symptoms. PSYCHIATRIC: No anxiety, pain, insomnia or depression    Objective:     PHYSICAL EXAM:      VITALS:    BP (!) 148/91 (Site: Right Upper Arm, Position: Sitting, Cuff Size: Large Adult)   Pulse 68   Resp 16   Ht 5' 10\" (1.778 m)   Wt 249 lb 3.2 oz (113 kg) Comment: is wearing steel toe boots and has serveral sets of keys  SpO2 95%   BMI 35.76 kg/m²   CONSTITUTIONAL: Cooperative, no apparent distress, and appears well nourished / developed. NEUROLOGIC:  Awake and orientated to person, place and time. PSYCH: Calm affect. SKIN: Warm and dry. HEENT: Sclera non-icteric, normocephalic, neck supple, no elevation of JVP, normal carotid pulses with no bruits and thyroid normal size. LUNGS:  No increased work of breathing and clear to auscultation, no crackles or wheezing. CARDIOVASCULAR:  Regular rate and rhythm with no murmurs, gallops, rubs, or abnormal heart sounds. The apical impulses not displaced. The carotid upstroke is normal in amplitude and contour without delay or bruit. JVP is not elevated. ABDOMEN:  Normal bowel sounds, non-distended and non-tender to palpation. EXT: No edema, no calf tenderness. Pulses are present bilaterally.     DATA:    Lab Results   Component Value Date    ALT 73 (H) 11/07/2018    AST 38 11/07/2018    ALKPHOS 71 11/07/2018    BILITOT 0.45 11/07/2018     Lab Results   Component Value Date    CREATININE 1.32 (H) 11/11/2018    BUN 14 11/11/2018     11/11/2018    K 4.0 11/11/2018     11/11/2018    CO2 26 11/11/2018     Lab Results   Component Value Date    TSH 1.22 09/14/2018    M0WKMQK 10.2 07/16/2012     Lab Results   Component Value Date    WBC 11.3 11/11/2018    HGB 16.0 11/11/2018    HCT 49.3 11/11/2018    MCV 91.1 11/11/2018     11/11/2018       Lab Results   Component Value Date    TRIG 369 07/17/2017    TRIG 335 08/08/2016    TRIG 366 (H) 01/05/2016     Lab Results   Component Value Date HDL 35 09/14/2018    HDL 38 (L) 12/02/2017    HDL 36 07/17/2017     Lab Results   Component Value Date    LDLCHOLESTEROL 53 12/02/2017    LDLCHOLESTEROL 84 01/05/2016    LDLCHOLESTEROL 115 11/14/2014       Assessment:      Diagnosis Orders   1. Atypical chest pain  Lipid Panel    CBC    Stress test myoview    Troponin I    EKG 12 lead   2. Mixed hyperlipidemia  EKG 12 lead   3. Mild CAD     4. WES (obstructive sleep apnea)     5. Noncompliance with CPAP treatment     6. Obesity, Class II, BMI 35-39.9       Plan:     Atypical Chest Pain   Current chest pain is atypical.  He can pinpoint the site of the pain with one finger. No significant radiation. No relation to exertion. Not associated with any sweating or diaphoresis. Short duration chest pain usually lasts less then a minute but sometimes lasts for a few minutes. What he calls increased frequency of the chest pain is not very consistent. Patient is very active with no significant exertional symptoms. Patient is markedly anxious with multiple complaints in the office today. I spent a long time discussing with the patient the nature of his chest pain, finally we decided to go blood work and stress test today to rule out myocardial ischemia. Antiplatelet Agent: Start aspirin 81 mg daily. I also reminded him to watch for signs of blood in his stool or black tarry stools and stop the medication immediately if this develops as this could be life threatening. Beta Blocker Therapy: Continue metoprolol succinate (Toprol XL)  25 mg twice daily     Statin Therapy: Continue pitavastatin 1 mg nightly      Additional Testing: I ordered a treadmill stress test with SPECT imaging to assess for myocardial ischemia. Counseling: I advised Mr. Jose King to call our office or go to the emergency room if he develops worsening or persistent chest pain or shortness of breath as this could be life threatening.     Atherosclerotic Heart Disease:  Beta Blocker: Continue

## 2019-05-17 NOTE — PATIENT INSTRUCTIONS
SURVEY:    You may be receiving a survey from [employer] regarding your visit today. Please complete the survey to enable us to provide the highest quality of care to you and your family. If you cannot score us a very good on any question, please call the office to discuss how we could have made your experience a very good one. Thank you.

## 2019-05-20 ENCOUNTER — TELEPHONE (OUTPATIENT)
Dept: CARDIOLOGY | Age: 49
End: 2019-05-20

## 2019-05-20 PROCEDURE — 78452 HT MUSCLE IMAGE SPECT MULT: CPT | Performed by: INTERNAL MEDICINE

## 2019-05-20 PROCEDURE — 93016 CV STRESS TEST SUPVJ ONLY: CPT | Performed by: INTERNAL MEDICINE

## 2019-05-20 PROCEDURE — 93018 CV STRESS TEST I&R ONLY: CPT | Performed by: INTERNAL MEDICINE

## 2019-05-20 NOTE — PROCEDURES
[facility], [address] [place] [address]                              CARDIAC STRESS TEST    PATIENT NAME: José Luis Payne                      :        1970  MED REC NO:   [id number]                              ROOM:  ACCOUNT NO:   [de-identified]                           ADMIT DATE: 2019  PROVIDER:     Tristen Crum nubia    CARDIOVASCULAR DIAGNOSTIC DEPARTMENT    DATE OF STUDY:  2019    ORDERING PROVIDER:  Tristen Su. Antonio Ambrosio MD    PRIMARY CARE PROVIDER:  Maci Milligan MD    INTERPRETING PHYSICIAN:  Tristen Su. Antonio Ambrosio MD    EXERCISE MYOCARDIAL PERFUSION STRESS TEST REPORT    Rest/stress single-isotope SPECT imaging with exercise stress and gated  SPECT imaging    INDICATION:  Assessment of recent chest pain and/or discomfort    CLINICAL HISTORY:  The patient is a 70-year-old man with no known  coronary artery disease. Previous cardiac history includes:  Stress test, cardiac catheterization  (calcification LMA)    Other previous history includes:  Chest pain, palpitations, fatigue,  dyspnea, lightheadedness, indigestion, heartburn, arm pain    Symptoms just prior to testing included:  None    Relevant medications:  Metoprolol    PROCEDURE:  The patient performed treadmill exercise using a Gordy  protocol, completing 7:11 minutes and completing an estimated workload  of 2.13 metabolic equivalents (METS). The test was terminated due to fatigue and shortness of breath. The heart rate was 86 beats per minute at baseline and increased to 166  beats per minute at peak exercise, which was 97% of the maximum  predicted heart rate. The rest blood pressure was 130/84 mmHg and  increased to 154/96 mmHg, which is a normal response. During the  procedure, the patient developed fatigue, shortness of breath, and leg  fatigue but denied any chest discomfort.     Myocardial perfusion imaging: Imaging was performed at rest 30-45  minutes following the injection of 10.0 mCi of sestamibi. At peak  exercise, the patient was injected with 30.0 mCi of sestamibi and  exercise was continued for 1 minute. Gating post-stress tomographic  imaging was performed 30-45 minutes after stress. STRESS ECG RESULTS:  The resting electrocardiogram demonstrated normal  sinus rhythm without definitive ST-segment abnormalities suggestive of  myocardial ischemia. At peak exercise and during recovery, the patient developed:    No significant ST segment changes suggestive of myocardial ischemia with  no premature atrial contractions (PACs) and occasional premature  ventricular contractions (PVCs). NUCLEAR IMAGING RESULTS:  The overall quality of the study is fair. No  significant attenuation artifact was seen. There is no evidence of  abnormal lung uptake. Additionally, the right ventricle appears normal.  The left ventricular cavity is noted to be normal in size on the stress  images. There is no evidence of transient ischemic dilatation (TID) of  the left ventricle. Gated SPECT imaging reveals normal myocardial thickening and wall motion  with a calculated left ventricular ejection fraction of 61%. The rest images demonstrate homogeneous tracer distribution throughout  the myocardium. SPECT images demonstrate homogeneous tracer distribution throughout the  myocardium. IMPRESSION:  1. Normal myocardial perfusion imaging without evidence of significant  myocardial ischemia or infarction. 2.  Global left ventricular systolic function was normal with an EF of  61% without regional wall motion abnormalities. 3.  No significant electrocardiographic evidence of myocardial ischemia  during EKG monitoring without significant associated arrhythmias. The patient's Duke Treadmill score is 7, which correlates with a low  risk for significant coronary artery disease. Overall, these results are most consistent with a low risk scan.     The sensitivity for detecting ischemia on this test may have been  reduced due to the patient being on a beta blocker. IVÁN CANCHOLA    D: 05/20/2019 6:21:29       T: 05/20/2019 6:22:42     [email or web address]  Job#: [id number]     Doc#: Unknown    CC:  Ramez Jefferson.  Nito Elena

## 2019-10-24 RX ORDER — METOPROLOL SUCCINATE 25 MG/1
25 TABLET, EXTENDED RELEASE ORAL 2 TIMES DAILY
Qty: 180 TABLET | Refills: 3 | Status: SHIPPED | OUTPATIENT
Start: 2019-10-24 | End: 2020-11-04

## 2019-11-26 ENCOUNTER — OFFICE VISIT (OUTPATIENT)
Dept: CARDIOLOGY | Age: 49
End: 2019-11-26
Payer: COMMERCIAL

## 2019-11-26 VITALS
HEIGHT: 70 IN | OXYGEN SATURATION: 94 % | SYSTOLIC BLOOD PRESSURE: 124 MMHG | RESPIRATION RATE: 18 BRPM | HEART RATE: 66 BPM | DIASTOLIC BLOOD PRESSURE: 78 MMHG | WEIGHT: 248.6 LBS | BODY MASS INDEX: 35.59 KG/M2

## 2019-11-26 DIAGNOSIS — E78.2 MIXED HYPERLIPIDEMIA: ICD-10-CM

## 2019-11-26 DIAGNOSIS — G47.33 OSA (OBSTRUCTIVE SLEEP APNEA): ICD-10-CM

## 2019-11-26 DIAGNOSIS — Z91.14 NONCOMPLIANCE WITH CPAP TREATMENT: ICD-10-CM

## 2019-11-26 DIAGNOSIS — I25.10 ASHD (ARTERIOSCLEROTIC HEART DISEASE): Primary | ICD-10-CM

## 2019-11-26 DIAGNOSIS — E66.9 OBESITY, CLASS II, BMI 35-39.9: ICD-10-CM

## 2019-11-26 PROCEDURE — 99214 OFFICE O/P EST MOD 30 MIN: CPT | Performed by: INTERNAL MEDICINE

## 2019-11-26 RX ORDER — MIDODRINE HYDROCHLORIDE 10 MG/1
10 TABLET ORAL 2 TIMES DAILY
Qty: 180 TABLET | Refills: 3 | Status: SHIPPED | OUTPATIENT
Start: 2019-11-26 | End: 2020-12-14

## 2019-12-05 ENCOUNTER — HOSPITAL ENCOUNTER (OUTPATIENT)
Dept: MRI IMAGING | Age: 49
Discharge: HOME | End: 2019-12-07
Payer: COMMERCIAL

## 2019-12-05 DIAGNOSIS — M48.02 CERVICAL SPINAL STENOSIS: ICD-10-CM

## 2019-12-05 PROCEDURE — 72141 MRI NECK SPINE W/O DYE: CPT

## 2019-12-20 ENCOUNTER — HOSPITAL ENCOUNTER (EMERGENCY)
Age: 49
Discharge: HOME | End: 2019-12-20
Attending: EMERGENCY MEDICINE
Payer: COMMERCIAL

## 2019-12-20 ENCOUNTER — APPOINTMENT (OUTPATIENT)
Dept: GENERAL RADIOLOGY | Age: 49
End: 2019-12-20
Payer: COMMERCIAL

## 2019-12-20 VITALS
TEMPERATURE: 97 F | OXYGEN SATURATION: 93 % | HEART RATE: 91 BPM | SYSTOLIC BLOOD PRESSURE: 162 MMHG | RESPIRATION RATE: 17 BRPM | DIASTOLIC BLOOD PRESSURE: 105 MMHG

## 2019-12-20 DIAGNOSIS — J45.20 MILD INTERMITTENT ASTHMATIC BRONCHITIS WITHOUT COMPLICATION: Primary | ICD-10-CM

## 2019-12-20 PROCEDURE — 94664 DEMO&/EVAL PT USE INHALER: CPT

## 2019-12-20 PROCEDURE — 71046 X-RAY EXAM CHEST 2 VIEWS: CPT

## 2019-12-20 PROCEDURE — 99283 EMERGENCY DEPT VISIT LOW MDM: CPT

## 2019-12-20 PROCEDURE — 6370000000 HC RX 637 (ALT 250 FOR IP): Performed by: EMERGENCY MEDICINE

## 2019-12-20 RX ORDER — CLARITHROMYCIN 500 MG/1
500 TABLET, COATED ORAL 2 TIMES DAILY
Qty: 20 TABLET | Refills: 0 | Status: SHIPPED | OUTPATIENT
Start: 2019-12-20 | End: 2019-12-30

## 2019-12-20 RX ORDER — ALBUTEROL SULFATE 90 UG/1
2 AEROSOL, METERED RESPIRATORY (INHALATION) EVERY 4 HOURS PRN
Status: DISCONTINUED | OUTPATIENT
Start: 2019-12-20 | End: 2019-12-20 | Stop reason: HOSPADM

## 2019-12-20 RX ORDER — IPRATROPIUM BROMIDE AND ALBUTEROL SULFATE 2.5; .5 MG/3ML; MG/3ML
1 SOLUTION RESPIRATORY (INHALATION) ONCE
Status: COMPLETED | OUTPATIENT
Start: 2019-12-20 | End: 2019-12-20

## 2019-12-20 RX ORDER — PREDNISONE 20 MG/1
40 TABLET ORAL DAILY
Qty: 10 TABLET | Refills: 0 | Status: SHIPPED | OUTPATIENT
Start: 2019-12-20 | End: 2019-12-25

## 2019-12-20 RX ORDER — ALBUTEROL SULFATE 90 UG/1
AEROSOL, METERED RESPIRATORY (INHALATION)
Qty: 1 INHALER | Refills: 1 | Status: SHIPPED | OUTPATIENT
Start: 2019-12-20 | End: 2020-12-30

## 2019-12-20 RX ORDER — PROMETHAZINE HYDROCHLORIDE AND CODEINE PHOSPHATE 6.25; 1 MG/5ML; MG/5ML
5 SOLUTION ORAL 4 TIMES DAILY
Qty: 140 ML | Refills: 0 | Status: SHIPPED | OUTPATIENT
Start: 2019-12-20 | End: 2019-12-27

## 2019-12-20 RX ORDER — BENZONATATE 200 MG/1
200 CAPSULE ORAL 3 TIMES DAILY PRN
Qty: 30 CAPSULE | Refills: 0 | Status: SHIPPED | OUTPATIENT
Start: 2019-12-20 | End: 2019-12-30

## 2019-12-20 RX ADMIN — IPRATROPIUM BROMIDE AND ALBUTEROL SULFATE 1 AMPULE: .5; 3 SOLUTION RESPIRATORY (INHALATION) at 01:39

## 2019-12-20 ASSESSMENT — ENCOUNTER SYMPTOMS
COUGH: 1
SHORTNESS OF BREATH: 0
NAUSEA: 0
EYE REDNESS: 0
SORE THROAT: 0
TROUBLE SWALLOWING: 0
EYE DISCHARGE: 0
ABDOMINAL PAIN: 0
VOMITING: 0

## 2019-12-20 ASSESSMENT — PAIN DESCRIPTION - LOCATION: LOCATION: THROAT

## 2019-12-20 ASSESSMENT — PAIN DESCRIPTION - DESCRIPTORS: DESCRIPTORS: ACHING;TENDER

## 2019-12-20 ASSESSMENT — PAIN DESCRIPTION - PAIN TYPE: TYPE: ACUTE PAIN

## 2019-12-20 ASSESSMENT — PAIN SCALES - GENERAL: PAINLEVEL_OUTOF10: 2

## 2019-12-26 ENCOUNTER — APPOINTMENT (OUTPATIENT)
Dept: GENERAL RADIOLOGY | Age: 49
End: 2019-12-26
Payer: COMMERCIAL

## 2019-12-26 ENCOUNTER — HOSPITAL ENCOUNTER (EMERGENCY)
Age: 49
Discharge: HOME | End: 2019-12-26
Attending: EMERGENCY MEDICINE
Payer: COMMERCIAL

## 2019-12-26 VITALS
HEART RATE: 95 BPM | TEMPERATURE: 97 F | RESPIRATION RATE: 16 BRPM | OXYGEN SATURATION: 98 % | SYSTOLIC BLOOD PRESSURE: 114 MMHG | DIASTOLIC BLOOD PRESSURE: 84 MMHG

## 2019-12-26 DIAGNOSIS — J44.1 COPD EXACERBATION (HCC): Primary | ICD-10-CM

## 2019-12-26 LAB
ABSOLUTE EOS #: 0.08 K/UL (ref 0–0.44)
ABSOLUTE IMMATURE GRANULOCYTE: 0.06 K/UL (ref 0–0.3)
ABSOLUTE LYMPH #: 4.18 K/UL (ref 1.1–3.7)
ABSOLUTE MONO #: 0.71 K/UL (ref 0.1–1.2)
ANION GAP SERPL CALCULATED.3IONS-SCNC: 13 MMOL/L (ref 9–17)
BASOPHILS # BLD: 0 % (ref 0–2)
BASOPHILS ABSOLUTE: 0.03 K/UL (ref 0–0.2)
BUN BLDV-MCNC: 16 MG/DL (ref 6–20)
BUN/CREAT BLD: 16 (ref 9–20)
CALCIUM SERPL-MCNC: 9.2 MG/DL (ref 8.6–10.4)
CHLORIDE BLD-SCNC: 98 MMOL/L (ref 98–107)
CO2: 23 MMOL/L (ref 20–31)
CREAT SERPL-MCNC: 0.99 MG/DL (ref 0.7–1.2)
DIFFERENTIAL TYPE: ABNORMAL
EKG ATRIAL RATE: 86 BPM
EKG P AXIS: 30 DEGREES
EKG P-R INTERVAL: 146 MS
EKG Q-T INTERVAL: 366 MS
EKG QRS DURATION: 82 MS
EKG QTC CALCULATION (BAZETT): 437 MS
EKG R AXIS: 0 DEGREES
EKG T AXIS: 20 DEGREES
EKG VENTRICULAR RATE: 86 BPM
EOSINOPHILS RELATIVE PERCENT: 1 % (ref 1–4)
GFR AFRICAN AMERICAN: >60 ML/MIN
GFR NON-AFRICAN AMERICAN: >60 ML/MIN
GFR SERPL CREATININE-BSD FRML MDRD: ABNORMAL ML/MIN/{1.73_M2}
GFR SERPL CREATININE-BSD FRML MDRD: ABNORMAL ML/MIN/{1.73_M2}
GLUCOSE BLD-MCNC: 106 MG/DL (ref 70–99)
HCT VFR BLD CALC: 49.1 % (ref 40.7–50.3)
HEMOGLOBIN: 15.9 G/DL (ref 13–17)
IMMATURE GRANULOCYTES: 1 %
LYMPHOCYTES # BLD: 54 % (ref 24–43)
MCH RBC QN AUTO: 28.8 PG (ref 25.2–33.5)
MCHC RBC AUTO-ENTMCNC: 32.4 G/DL (ref 28.4–34.8)
MCV RBC AUTO: 88.8 FL (ref 82.6–102.9)
MONOCYTES # BLD: 9 % (ref 3–12)
NRBC AUTOMATED: 0 PER 100 WBC
PDW BLD-RTO: 13 % (ref 11.8–14.4)
PLATELET # BLD: 209 K/UL (ref 138–453)
PLATELET ESTIMATE: ABNORMAL
PMV BLD AUTO: 9.3 FL (ref 8.1–13.5)
POTASSIUM SERPL-SCNC: 4.2 MMOL/L (ref 3.7–5.3)
RBC # BLD: 5.53 M/UL (ref 4.21–5.77)
RBC # BLD: ABNORMAL 10*6/UL
SEG NEUTROPHILS: 35 % (ref 36–65)
SEGMENTED NEUTROPHILS ABSOLUTE COUNT: 2.75 K/UL (ref 1.5–8.1)
SODIUM BLD-SCNC: 134 MMOL/L (ref 135–144)
TROPONIN INTERP: NORMAL
TROPONIN INTERP: NORMAL
TROPONIN T: <0.03 NG/ML
TROPONIN T: <0.03 NG/ML
TROPONIN, HIGH SENSITIVITY: NORMAL NG/L (ref 0–22)
TROPONIN, HIGH SENSITIVITY: NORMAL NG/L (ref 0–22)
WBC # BLD: 7.8 K/UL (ref 3.5–11.3)
WBC # BLD: ABNORMAL 10*3/UL

## 2019-12-26 PROCEDURE — 36415 COLL VENOUS BLD VENIPUNCTURE: CPT

## 2019-12-26 PROCEDURE — 94640 AIRWAY INHALATION TREATMENT: CPT

## 2019-12-26 PROCEDURE — 85025 COMPLETE CBC W/AUTO DIFF WBC: CPT

## 2019-12-26 PROCEDURE — 84484 ASSAY OF TROPONIN QUANT: CPT

## 2019-12-26 PROCEDURE — 96375 TX/PRO/DX INJ NEW DRUG ADDON: CPT

## 2019-12-26 PROCEDURE — 96374 THER/PROPH/DIAG INJ IV PUSH: CPT

## 2019-12-26 PROCEDURE — 6370000000 HC RX 637 (ALT 250 FOR IP): Performed by: EMERGENCY MEDICINE

## 2019-12-26 PROCEDURE — 93010 ELECTROCARDIOGRAM REPORT: CPT | Performed by: INTERNAL MEDICINE

## 2019-12-26 PROCEDURE — 71046 X-RAY EXAM CHEST 2 VIEWS: CPT

## 2019-12-26 PROCEDURE — 93005 ELECTROCARDIOGRAM TRACING: CPT | Performed by: EMERGENCY MEDICINE

## 2019-12-26 PROCEDURE — 80048 BASIC METABOLIC PNL TOTAL CA: CPT

## 2019-12-26 PROCEDURE — 99285 EMERGENCY DEPT VISIT HI MDM: CPT

## 2019-12-26 PROCEDURE — 6360000002 HC RX W HCPCS: Performed by: EMERGENCY MEDICINE

## 2019-12-26 PROCEDURE — 94664 DEMO&/EVAL PT USE INHALER: CPT

## 2019-12-26 RX ORDER — METHYLPREDNISOLONE SODIUM SUCCINATE 125 MG/2ML
125 INJECTION, POWDER, LYOPHILIZED, FOR SOLUTION INTRAMUSCULAR; INTRAVENOUS ONCE
Status: COMPLETED | OUTPATIENT
Start: 2019-12-26 | End: 2019-12-26

## 2019-12-26 RX ORDER — PREDNISONE 10 MG/1
TABLET ORAL
Qty: 20 TABLET | Refills: 0 | Status: SHIPPED | OUTPATIENT
Start: 2019-12-26 | End: 2020-01-05

## 2019-12-26 RX ORDER — ONDANSETRON 4 MG/1
4 TABLET, ORALLY DISINTEGRATING ORAL EVERY 8 HOURS PRN
Qty: 20 TABLET | Refills: 0 | Status: SHIPPED | OUTPATIENT
Start: 2019-12-26 | End: 2020-10-08 | Stop reason: ALTCHOICE

## 2019-12-26 RX ORDER — ONDANSETRON 2 MG/ML
4 INJECTION INTRAMUSCULAR; INTRAVENOUS ONCE
Status: COMPLETED | OUTPATIENT
Start: 2019-12-26 | End: 2019-12-26

## 2019-12-26 RX ORDER — IPRATROPIUM BROMIDE AND ALBUTEROL SULFATE 2.5; .5 MG/3ML; MG/3ML
1 SOLUTION RESPIRATORY (INHALATION) ONCE
Status: COMPLETED | OUTPATIENT
Start: 2019-12-26 | End: 2019-12-26

## 2019-12-26 RX ORDER — BENZONATATE 100 MG/1
100 CAPSULE ORAL 3 TIMES DAILY PRN
Qty: 30 CAPSULE | Refills: 0 | Status: SHIPPED | OUTPATIENT
Start: 2019-12-26 | End: 2020-01-02

## 2019-12-26 RX ADMIN — IPRATROPIUM BROMIDE AND ALBUTEROL SULFATE 1 AMPULE: .5; 3 SOLUTION RESPIRATORY (INHALATION) at 04:33

## 2019-12-26 RX ADMIN — METHYLPREDNISOLONE SODIUM SUCCINATE 125 MG: 125 INJECTION, POWDER, FOR SOLUTION INTRAMUSCULAR; INTRAVENOUS at 04:20

## 2019-12-26 RX ADMIN — ONDANSETRON 4 MG: 2 INJECTION INTRAMUSCULAR; INTRAVENOUS at 06:20

## 2019-12-26 ASSESSMENT — ENCOUNTER SYMPTOMS
NAUSEA: 0
BACK PAIN: 0
ABDOMINAL PAIN: 0
SHORTNESS OF BREATH: 1
VOMITING: 0
TROUBLE SWALLOWING: 0
COLOR CHANGE: 0
CHEST TIGHTNESS: 0
VOICE CHANGE: 0
COUGH: 1
SORE THROAT: 1

## 2020-01-25 ENCOUNTER — HOSPITAL ENCOUNTER (OUTPATIENT)
Dept: GENERAL RADIOLOGY | Age: 50
Discharge: HOME | End: 2020-01-27
Payer: COMMERCIAL

## 2020-01-25 ENCOUNTER — HOSPITAL ENCOUNTER (OUTPATIENT)
Age: 50
Discharge: HOME | End: 2020-01-27
Payer: COMMERCIAL

## 2020-01-25 PROCEDURE — 71046 X-RAY EXAM CHEST 2 VIEWS: CPT

## 2020-01-29 ENCOUNTER — HOSPITAL ENCOUNTER (OUTPATIENT)
Dept: PULMONOLOGY | Age: 50
Discharge: HOME | End: 2020-01-29
Payer: COMMERCIAL

## 2020-01-29 PROCEDURE — 94726 PLETHYSMOGRAPHY LUNG VOLUMES: CPT

## 2020-01-29 PROCEDURE — 94729 DIFFUSING CAPACITY: CPT

## 2020-01-29 PROCEDURE — 94664 DEMO&/EVAL PT USE INHALER: CPT

## 2020-01-29 PROCEDURE — 94060 EVALUATION OF WHEEZING: CPT

## 2020-01-29 PROCEDURE — 6360000002 HC RX W HCPCS: Performed by: INTERNAL MEDICINE

## 2020-01-29 RX ORDER — ALBUTEROL SULFATE 2.5 MG/3ML
2.5 SOLUTION RESPIRATORY (INHALATION) ONCE
Status: COMPLETED | OUTPATIENT
Start: 2020-01-29 | End: 2020-01-29

## 2020-01-29 RX ADMIN — ALBUTEROL SULFATE 2.5 MG: 2.5 SOLUTION RESPIRATORY (INHALATION) at 16:48

## 2020-02-03 NOTE — PROCEDURES
[address]                               PULMONARY FUNCTION    PATIENT NAME: Cinthya Dickerson                      :        1970  MED REC NO:   [id number]                              ROOM:  ACCOUNT NO:   [de-identified]                           ADMIT DATE: 2020  PROVIDER:     Neida Jimenez    DATE OF PROCEDURE:  2020    The patient's spirometry shows a flow-volume loop, which is normal.   FEV1 77% predicted with 6% bronchodilator change to 81% predicted. FVC  75% predicted with 2% bronchodilator change to 77% predicted. FEV1 to  FVC ratio is 79. [id number] 77% predicted. LUNG VOLUME BY BODY BOX:  Total lung capacity 95% predicted and %  predicted. Slow vital capacity 79% predicted. Diffusion capacity 84%  predicted, normal.  Airway resistance is normal.    FINAL IMPRESSION:  The study shows mild restrictive defect with a  bronchodilator response that does not meet ATS criteria, but a clinical  trial is warranted. The restrictive defect is extrapulmonic. Clinical  correlation is advised. Katie Wilkes    D: 2020 10:36:46       T: 2020 10:40:26     [id number]  Job#: [id number]     Doc#: [id number]    CC:  Toy Chacon

## 2020-07-20 ENCOUNTER — HOSPITAL ENCOUNTER (OUTPATIENT)
Dept: LAB | Age: 50
Discharge: HOME | End: 2020-07-20
Payer: COMMERCIAL

## 2020-07-20 PROCEDURE — U0003 INFECTIOUS AGENT DETECTION BY NUCLEIC ACID (DNA OR RNA); SEVERE ACUTE RESPIRATORY SYNDROME CORONAVIRUS 2 (SARS-COV-2) (CORONAVIRUS DISEASE [COVID-19]), AMPLIFIED PROBE TECHNIQUE, MAKING USE OF HIGH THROUGHPUT TECHNOLOGIES AS DESCRIBED BY CMS-2020-01-R: HCPCS

## 2020-07-25 LAB — SARS-COV-2, NAA: NOT DETECTED

## 2020-07-26 ENCOUNTER — TELEPHONE (OUTPATIENT)
Dept: PRIMARY CARE CLINIC | Age: 50
End: 2020-07-26

## 2020-08-04 ENCOUNTER — HOSPITAL ENCOUNTER (OUTPATIENT)
Age: 50
Discharge: HOME | End: 2020-08-04
Payer: COMMERCIAL

## 2020-08-04 LAB
ESTIMATED AVERAGE GLUCOSE: 128 MG/DL
HBA1C MFR BLD: 6.1 % (ref 4.8–5.9)

## 2020-08-04 PROCEDURE — 36415 COLL VENOUS BLD VENIPUNCTURE: CPT

## 2020-08-04 PROCEDURE — 83036 HEMOGLOBIN GLYCOSYLATED A1C: CPT

## 2020-10-06 ENCOUNTER — HOSPITAL ENCOUNTER (OUTPATIENT)
Age: 50
Discharge: HOME | End: 2020-10-06
Payer: COMMERCIAL

## 2020-10-06 LAB
ALT SERPL-CCNC: 83 U/L (ref 5–41)
ANION GAP SERPL CALCULATED.3IONS-SCNC: 13 MMOL/L (ref 9–17)
AST SERPL-CCNC: 50 U/L
BUN BLDV-MCNC: 11 MG/DL (ref 6–20)
BUN/CREAT BLD: 11 (ref 9–20)
CALCIUM SERPL-MCNC: 9.7 MG/DL (ref 8.6–10.4)
CHLORIDE BLD-SCNC: 104 MMOL/L (ref 98–107)
CHOLESTEROL/HDL RATIO: 4.2
CHOLESTEROL: 147 MG/DL
CO2: 22 MMOL/L (ref 20–31)
CREAT SERPL-MCNC: 0.98 MG/DL (ref 0.7–1.2)
GFR AFRICAN AMERICAN: >60 ML/MIN
GFR NON-AFRICAN AMERICAN: >60 ML/MIN
GFR SERPL CREATININE-BSD FRML MDRD: ABNORMAL ML/MIN/{1.73_M2}
GFR SERPL CREATININE-BSD FRML MDRD: ABNORMAL ML/MIN/{1.73_M2}
GLUCOSE BLD-MCNC: 125 MG/DL (ref 70–99)
HDLC SERPL-MCNC: 35 MG/DL
LDL CHOLESTEROL: 52 MG/DL (ref 0–130)
POTASSIUM SERPL-SCNC: 4.2 MMOL/L (ref 3.7–5.3)
PROSTATE SPECIFIC ANTIGEN: 0.78 UG/L
SODIUM BLD-SCNC: 139 MMOL/L (ref 135–144)
TRIGL SERPL-MCNC: 298 MG/DL
VLDLC SERPL CALC-MCNC: ABNORMAL MG/DL (ref 1–30)

## 2020-10-06 PROCEDURE — 80061 LIPID PANEL: CPT

## 2020-10-06 PROCEDURE — 84460 ALANINE AMINO (ALT) (SGPT): CPT

## 2020-10-06 PROCEDURE — 36415 COLL VENOUS BLD VENIPUNCTURE: CPT

## 2020-10-06 PROCEDURE — 84450 TRANSFERASE (AST) (SGOT): CPT

## 2020-10-06 PROCEDURE — 80048 BASIC METABOLIC PNL TOTAL CA: CPT

## 2020-10-06 PROCEDURE — G0103 PSA SCREENING: HCPCS

## 2020-11-12 ENCOUNTER — OFFICE VISIT (OUTPATIENT)
Dept: CARDIOLOGY | Age: 50
End: 2020-11-12
Payer: COMMERCIAL

## 2020-11-12 VITALS
BODY MASS INDEX: 35.68 KG/M2 | HEIGHT: 70 IN | SYSTOLIC BLOOD PRESSURE: 132 MMHG | DIASTOLIC BLOOD PRESSURE: 89 MMHG | WEIGHT: 249.2 LBS | RESPIRATION RATE: 18 BRPM | OXYGEN SATURATION: 98 % | HEART RATE: 82 BPM

## 2020-11-12 PROCEDURE — 99214 OFFICE O/P EST MOD 30 MIN: CPT | Performed by: INTERNAL MEDICINE

## 2020-11-12 NOTE — PROGRESS NOTES
have a cervical fusion done a few months ago at [facility] and he also recently been diagnosed with partially occlusive thrombus in the left internal jugular vein. This is according to the CT scan done on 11/10/2020. He is a started on Eliquis 10 mg twice daily for 1 week followed by 5 mg twice daily. He was told to follow-up with vascular doctor. He was cleared to go back to work and he said his stamina isn't there, he gets short of breath and tired. He attributes this to gaining 15 pounds after his neck surgery. He has left upper chest pain that he can pinpoint to the side of the pain with 1 finger. Sometimes it moves across the chest.  No sweating or diaphoresis. No pressure or tightness. No relation to exertion. No change in the intensity, frequency or duration of pain. He does not use CPAP regularly. He was told that he will need to have CPAP titration study because his pressures are not right. He is going to talk to Dr. Jose Banda about that. He also states he has occasional lightheaded/dizziness denies any falls or near falls. This has not changed from prior. He reported occasional abdominal pain. No nausea, vomiting or problem moving his bowel. He is extremely concerned about AAA because his father had abdominal aortic aneurysm. I think giving history of coronary artery calcification, aortic calcification and chest CT it is reasonable to check him for AAA via ultrasound. Past Medical History:    Past Medical History:   Diagnosis Date    Anxiety     Asthma     Brain lesion     on MRI 2011, stable on MRI 2016 - thought to be due to sequela of migraine or mild small vessel ischemic disease    Depression     Essential and other specified forms of tremor     Fatty liver disease, nonalcoholic     GERD (gastroesophageal reflux disease)     H/O echocardiogram 01/05/2016    EF 55%. Mild LV hypertrophy. Mild diastolic dysfunction is seen.     Herniated disc     History of echocardiogram 12/12/2018    EF 60%. LV wall thickness is mildly increased. Mild pulmonic regurg.  History of Holter monitoring 09/20/2016    sinus rhythm with sinus tachycardia 27% of the study duration,average HR 90 bpm ranging between 51 - 147 bpm. Rare isolated PAC's and PVC's    History of stress test 01/05/2016    Relatively normal.  EF 63%. Moise Treadmill score is 7, Low risk for CAD    Hx of tilt table evaluation 08/15/2016    Abnormal.  Heart rate,blood pressure response and symptoms were most consistent with dysautonomia.  Hyperlipidemia     Hypertension     Impaired fasting glucose     Kidney stone     Obstructive sleep apnea (adult) (pediatric)     non-compliant with CPAP    Sinus tachycardia      Past Surgical History:  Past Surgical History:   Procedure Laterality Date    BLADDER STONE REMOVAL  1996    CARDIAC CATHETERIZATION Left 05/12/2017    via right radial approach/ Ashley Pisano/ Dr. Eloisa Cooper. LMCA: Normal 0%. stenosis. LAD: Mild irregularities 10-20%. LCx: Mild irregularities 20-30%. RCA: Normal 0% stenosis. small caliber vessel. EF 55%.     CERVICAL FUSION  08/27/2020    Dr. Jan Valdes [facility]     CHOLECYSTECTOMY  08/2011    COLONOSCOPY  2017    Dr. Angel Maya in [facility] - negative     [address] [facility] [address]  2007   [facility] 2017    Dr. Marcelo Tao in [address] [place] [address]  08/2011    Dr. Marcelo Tao in [place] [address] History:    Social History     Tobacco Use   Smoking Status Never Smoker   Smokeless Tobacco Never Used     Current Medications:  Outpatient Medications Marked as Taking for the 11/12/20 encounter (Office Visit) with Kip Sever, MD   Medication Sig Dispense Refill    apixaban (ELIQUIS) 5 MG TABS tablet Take 5 mg by mouth 2 times daily      apixaban starter pack (ELIQUIS DVT/PE STARTER PACK) 5 MG TBPK tablet Take 5 mg by mouth See Admin Instructions See 2 tablets twice daily      metoprolol succinate (TOPROL XL) 25 MG extended release tablet TAKE 1 TABLET BY MOUTH 2 TIMES DAILY 180 tablet 3    omeprazole (PRILOSEC) 40 MG delayed release capsule TAKE 1 CAPSULE BY MOUTH ONCE DAILY 30 capsule 5    SYMBICORT 160-4.5 MCG/ACT AERO INHALE 2 PUFFS BY MOUTH 2 TIMES A DAY. 10.2 g 5    albuterol sulfate  (90 Base) MCG/ACT inhaler 1 to 2 puffs every 4-6 hours as needed for shortness of breath or wheeze 1 Inhaler 1    midodrine (PROAMATINE) 10 MG tablet Take 1 tablet by mouth 2 times daily 180 tablet 3    pitavastatin (LIVALO) 1 MG TABS tablet Take 1 tablet by mouth nightly 90 tablet 3       REVIEW OF SYSTEMS:    CONSTITUTIONAL: No major weight gain or loss, fatigue, weakness, night sweats or fever. HEENT: No new vision difficulties or ringing in the ears. RESPIRATORY: See HPI  CARDIOVASCULAR: See HPI  GI: No nausea, vomiting, diarrhea, constipation, abdominal pain or changes in bowel habits. : No urinary frequency, urgency, incontinence hematuria or dysuria. SKIN: No cyanosis or skin lesions. MUSCULOSKELETAL: No new muscle or joint pain. NEUROLOGICAL: No syncope or TIA-like symptoms. PSYCHIATRIC: No anxiety, pain, insomnia or depression    Objective:     PHYSICAL EXAM:      VITALS:    /89 (Site: Left Upper Arm, Position: Sitting, Cuff Size: Large Adult)   Pulse 82   Resp 18   Ht 5' 10\" (1.778 m)   Wt 249 lb 3.2 oz (113 kg)   SpO2 98%   BMI 35.76 kg/m²   CONSTITUTIONAL: Cooperative, no apparent distress, and appears well nourished / developed. NEUROLOGIC:  Awake and orientated to person, place and time. PSYCH: Calm affect. SKIN: Warm and dry. HEENT: Sclera non-icteric, normocephalic, neck supple, no elevation of JVP, normal carotid pulses with no bruits and thyroid normal size. LUNGS:  No increased work of breathing and clear to auscultation, no crackles or wheezing. Cardiovascular: Normal rate, regular rhythm, normal heart sounds. Exam reveals no gallop and no friction rubs. including lightheadedness and dizziness and told him to stop the medication of this occurs and call our office if this occurs. Statin Therapy: Continue Pitavstatin     Continue aspirin 81 mg daily. Counseled patient extensively to come to the emergency room if he has any chest pain or worsening shortness of breath at this can be life-threatening. · Hyperlipidemia: Mixed  · Statin Therapy: Continue Pitavastatin 1 mg   · Last LDL done on 10/6/2020 was 52 mg/dL          · Obesity: I also briefly discussed both diet and exercise strategies for him to continue to loses weight and he was very receptive to this. I also printed out some simple lifestyle changes that he can make in his diet to help him lose weight. · Suspected Obstructive Sleep Apnea:  o He says he is having a new sleep apnea test done to adjust his CPAP pressure. · Left internal jugular vein thrombosis by CT scan on 11/10/2020  · Anticoagulation:Continue Eliquis 5mg 2 tablets twice daily for 1 week and then Continue Apixaban (Eliquis) 5 mg every 12 hours. · I will refer him to Vascular, Dr Mg Núñez for further consultation. · He may need hypercoagulable work-up to determine the duration of anticoagulant therapy. · Family history of abdominal aortic aneurysm. Patient also complaining of abdominal pain. He is extremely concerned and anxious about having AAA. He also has known coronary artery and aortic calcifications on prior CT. · I ordered an screening abdominal aortic aneurysm ultrasound    Finally, I recommended that he continue his other medications and follow up with you as previously scheduled. FOLLOW UP:   I told Mr. Ivy Drummond to call my office if he had any problems, but otherwise told him to Return in about 1 year (around 11/12/2021). However, I would be happy to see him sooner should the need arise. Sincerely,  Linh Hong MD, MS, F.A.C.C.   [facility]) [facility], [address]  [address], [place] [facility], [place] [address]  Phone: [phone], Fax: [phone]    I believe that the risk of significant morbidity and mortality related to the patient's current medical conditions are: Intermediate. 25 minutes were spent with the patient and all of his questions were answered. The documentation recorded by the scribe, accurately and completely reflects the services I personally performed and the decisions made by me. Christiano Lopez MD, F.A.C.C.  November 12, 2020

## 2020-11-12 NOTE — PATIENT INSTRUCTIONS
SURVEY:    You may be receiving a survey from [employer] regarding your visit today. Please complete the survey to enable us to provide the highest quality of care to you and your family. If you cannot score us a very good on any question, please call the office to discuss how we could have made your experience a very good one. Thank you.

## 2020-11-17 ENCOUNTER — TELEPHONE (OUTPATIENT)
Dept: CARDIOLOGY | Age: 50
End: 2020-11-17

## 2020-11-18 ENCOUNTER — TELEPHONE (OUTPATIENT)
Dept: CARDIOLOGY | Age: 50
End: 2020-11-18

## 2020-11-19 NOTE — TELEPHONE ENCOUNTER
Pt called back and left a voicemail that he would like to continue taking the eliquis since his symptoms have resolved.

## 2020-11-19 NOTE — TELEPHONE ENCOUNTER
This is usually not a common side effect of the Eliquis but if he wants to try different medicine we can start him on Xarelto 20 mg once a day.   Please let me know thank you

## 2020-11-25 ENCOUNTER — OFFICE VISIT (OUTPATIENT)
Dept: VASCULAR SURGERY | Age: 50
End: 2020-11-25
Payer: COMMERCIAL

## 2020-11-25 ENCOUNTER — TELEPHONE (OUTPATIENT)
Dept: CARDIOLOGY | Age: 50
End: 2020-11-25

## 2020-11-25 VITALS
SYSTOLIC BLOOD PRESSURE: 136 MMHG | DIASTOLIC BLOOD PRESSURE: 81 MMHG | HEART RATE: 95 BPM | WEIGHT: 250 LBS | BODY MASS INDEX: 35.79 KG/M2 | TEMPERATURE: 97.8 F | HEIGHT: 70 IN | RESPIRATION RATE: 20 BRPM

## 2020-11-25 PROCEDURE — 99214 OFFICE O/P EST MOD 30 MIN: CPT | Performed by: INTERNAL MEDICINE

## 2020-11-25 NOTE — PROGRESS NOTES
Brandon Aquino was seen on 11/25/2020 for   Chief Complaint   Patient presents with    New Patient     DVT in carotid on left side of neck   . 11/25/20 1st [facility] Vascular visit. Had anterior approach disc fusion 8/27/20 in [facility]. Had trouble breathing so a CT was done 11/10 at [address]. Was found to have likely partial thrombotic occlusion Lij and treated with eliquis. Duration anticipated 3 mo. Not considered flow limiting US has not been done. Some swelling in neck. Dr Hebert Brumfield is pcp. The clot was detected in the LEFT IJ. Access for surgery was right. He is eager to get off eliquis. Started 11/10/20. Not sure if he's ever had clot before. Had pain in leg 5-10 yrs ago but US was negative. FH is negative for clotting. Father had AAA. 'off and on chest pain\" \"may be related to anxiety\".      Social History     Socioeconomic History    Marital status:      Spouse name: Not on file    Number of children: Not on file    Years of education: Not on file    Highest education level: Not on file   Occupational History    Occupation:    Social Needs    Financial resource strain: Not hard at all    Food insecurity     Worry: Never true     Inability: Never true    Transportation needs     Medical: No     Non-medical: No   Tobacco Use    Smoking status: Never Smoker    Smokeless tobacco: Never Used   Substance and Sexual Activity    Alcohol use: No    Drug use: No    Sexual activity: Not on file   Lifestyle    Physical activity     Days per week: Not on file     Minutes per session: Not on file    Stress: Not on file   Relationships    Social connections     Talks on phone: Not on file     Gets together: Not on file     Attends Mu-ism service: Not on file     Active member of club or organization: Not on file     Attends meetings of clubs or organizations: Not on file     Relationship status: Not on file    Intimate partner violence     Fear of current or ex partner: Not on file     Emotionally abused: Not on file     Physically abused: Not on file     Forced sexual activity: Not on file   Other Topics Concern    Not on file   Social History Narrative    Not on file     Family History   Problem Relation Age of Onset    Depression Mother     Colon Cancer Mother 64    Cancer Mother     COPD Father     Heart Disease Father    Bradend En[place] Father 78    Other Father         AAA     Heart Defect Sister         septal defect as a baby    Stroke Maternal Grandmother     Other Paternal Grandmother         tremors    Heart Disease Paternal Grandfather      Past Medical History:   Diagnosis Date    Anxiety     Asthma     Brain lesion     on MRI 2011, stable on MRI 2016 - thought to be due to sequela of migraine or mild small vessel ischemic disease    Depression     Essential and other specified forms of tremor     Fatty liver disease, nonalcoholic     GERD (gastroesophageal reflux disease)     H/O echocardiogram 01/05/2016    EF 55%. Mild LV hypertrophy. Mild diastolic dysfunction is seen.  Herniated disc     History of echocardiogram 12/12/2018    EF 60%. LV wall thickness is mildly increased. Mild pulmonic regurg.  History of Holter monitoring 09/20/2016    sinus rhythm with sinus tachycardia 27% of the study duration,average HR 90 bpm ranging between 51 - 147 bpm. Rare isolated PAC's and PVC's    History of stress test 01/05/2016    Relatively normal.  EF 63%. Moise Treadmill score is 7, Low risk for CAD    Hx of tilt table evaluation 08/15/2016    Abnormal.  Heart rate,blood pressure response and symptoms were most consistent with dysautonomia.     Hyperlipidemia     Hypertension     Impaired fasting glucose     Kidney stone     Obstructive sleep apnea (adult) (pediatric)     non-compliant with CPAP    Sinus tachycardia      Current Outpatient Medications   Medication Sig Dispense Refill    omeprazole (PRILOSEC) 40 MG delayed release capsule TAKE 1 CAPSULE BY MOUTH ONCE DAILY 30 capsule 5    apixaban (ELIQUIS) 5 MG TABS tablet Take 1 tablet by mouth 2 times daily Lot # [id number] exp 1/23 14 tablet 0    metoprolol succinate (TOPROL XL) 25 MG extended release tablet TAKE 1 TABLET BY MOUTH 2 TIMES DAILY 180 tablet 3    aspirin (ASPIRIN 81) 81 MG chewable tablet Take 81 mg by mouth daily      SYMBICORT 160-4.5 MCG/ACT AERO INHALE 2 PUFFS BY MOUTH 2 TIMES A DAY. 10.2 g 5    albuterol sulfate  (90 Base) MCG/ACT inhaler 1 to 2 puffs every 4-6 hours as needed for shortness of breath or wheeze 1 Inhaler 1    midodrine (PROAMATINE) 10 MG tablet Take 1 tablet by mouth 2 times daily 180 tablet 3    pitavastatin (LIVALO) 1 MG TABS tablet Take 1 tablet by mouth nightly 90 tablet 3     No current facility-administered medications for this visit. Physical findings:  General- no acute distress, oriented  HEENT - no xanthelasma, external ears normal  Neck- Supple, no thyromegaly. No swelling. Incision right supraclavicular region well healed. No scar left neck. Carotids - no carotid bruits  Lungs - Clear to auscultation. CV- Regular rate and rythym, no murmurs rubs or gallops  Abdomen - Non tender, non distended, no bruits  Skin- warm, dry, no skin breakdown or gangrene  Extremities - no edema    Pulses Right - Radial 2+     Pulses Left -Radial 2+    Assessment:  Encounter Diagnoses   Name Primary?  AAA (abdominal aortic aneurysm) without rupture (HCC) Yes    Acute deep vein thrombosis (DVT) of other vein of left upper extremity (HCC)      Jugular vein clot  Plan of care:  Reconfirm with us  eliquis 3 mo  Also needs aaa screen for strong fh  Follow up and evaluate.        Electronically signed by Cain Calero MD on 11/25/20 at 2:27 PM EST

## 2020-11-25 NOTE — PROGRESS NOTES
Ro Mittal was seen on 11/25/2020 for   Chief Complaint   Patient presents with    New Patient     DVT in carotid on left side of neck   .                             REVIEW OF SYSTEMS    Constitutional Weight: absent, A, Fatigue: absent Fever: absent   HEENT Ears: normal,Visual disturbance: absent Sore throat: absent,    Respiratory Shortness of breath: present, Cough: present;, Snoring: absent   Cardivascular Chest pain: present,  Leg pain: absent,Leg swelling:absent, Non-healing wound:absent    GI Diarrhea: absent, Abdominal Pain: absent    Urinary frequency: absent, Urinary incontinence: absent   Musculoskeletal Neck pain: absent, Back pain: present, Restless Leg:present     Dermatological Hair loss: absent, Skin changes: absent   Neurological  Sudden Loss of Vision in one eye:absent, Slurred Speech:absent, Weakness on one side of body: absent,Headache: absent   Psychiatric Anxiety: absent, Depression: absent   Hematologic Abnormal bleeding: absent,

## 2020-11-25 NOTE — PATIENT INSTRUCTIONS
SURVEY:    You may be receiving a survey from [employer] regarding your visit today. Please complete the survey to enable us to provide the highest quality of care to you and your family. If you cannot score us a very good on any question, please call the office to discuss how we could have made your experience a very good one. Thank you. Patient Education        Learning About Coronavirus [phone])  Coronavirus [phone]): Overview  What is coronavirus ([facility])? The coronavirus disease (COVID-19) is caused by a virus. It is an illness that was first found in December 2019. It has since spread worldwide. The virus can cause fever, cough, and trouble breathing. In severe cases, it can cause pneumonia and make it hard to breathe without help. It can cause death. This virus spreads person-to-person through droplets from coughing and sneezing. It can also spread when you are close to someone who is infected. And it can spread when you touch something that has the virus on it, such as a doorknob or a tabletop. Coronaviruses are a large group of viruses. They cause the common cold. They also cause more serious illnesses like Middle East respiratory syndrome (MERS) and severe acute respiratory syndrome (SARS). COVID-19 is caused by a novel coronavirus. That means it's a new type that has not been seen in people before. How is COVID-19 treated? Mild illness can be treated at home, but more serious illness needs to be treated in the hospital. Treatment may include medicines to reduce symptoms, plus breathing support such as oxygen therapy or a ventilator. Other treatments, such as antiviral medicines, may help people who have COVID-19. What can you do to protect yourself from COVID-19? The best way to protect yourself from getting sick is to:  · Avoid areas where there is an outbreak. · Avoid contact with people who may be infected.   · Avoid crowds and try to stay at least 6 feet away from other testing, especially if you have a weakened immune system. When to call for help  Call 911 anytime you think you may need emergency care. For example, call if:  · You have severe trouble breathing. (You can't talk at all.)  · You have constant chest pain or pressure. · You are severely dizzy or lightheaded. · You are confused or can't think clearly. · Your face and lips have a blue color. · You passed out (lost consciousness) or are very hard to wake up. Call your doctor now if you develop symptoms such as:  · Shortness of breath. · Fever. · Cough. If you need to get care, call ahead to the doctor's office for instructions before you go. Make sure you wear a face cover to prevent exposing other people to the virus. Where can you get the latest information? The following health organizations are tracking and studying this virus. Their websites contain the most up-to-date information. Theador Frias also learn what to do if you think you may have been exposed to the virus. · U.S. Centers for Disease Control and Prevention (CDC): The CDC provides updated news about the disease and travel advice. The website also tells you how to prevent the spread of infection. www.cdc.gov  · World Health Organization [facility]): WHO offers information about the virus outbreaks. WHO also has travel advice. www.who.int  Current as of: July 10, 2020               Content Version: 12.6  © 4223-6013 [employer]. Care instructions adapted under license by [facility] ([facility]). If you have questions about a medical condition or this instruction, always ask your healthcare professional. [place] [address] any warranty or liability for your use of this information.

## 2020-12-03 ENCOUNTER — HOSPITAL ENCOUNTER (OUTPATIENT)
Dept: LAB | Age: 50
Setting detail: SPECIMEN
Discharge: HOME | End: 2020-12-03
Payer: COMMERCIAL

## 2020-12-03 PROCEDURE — U0003 INFECTIOUS AGENT DETECTION BY NUCLEIC ACID (DNA OR RNA); SEVERE ACUTE RESPIRATORY SYNDROME CORONAVIRUS 2 (SARS-COV-2) (CORONAVIRUS DISEASE [COVID-19]), AMPLIFIED PROBE TECHNIQUE, MAKING USE OF HIGH THROUGHPUT TECHNOLOGIES AS DESCRIBED BY CMS-2020-01-R: HCPCS

## 2020-12-03 PROCEDURE — C9803 HOPD COVID-19 SPEC COLLECT: HCPCS

## 2020-12-04 LAB
SARS-COV-2, RAPID: ABNORMAL
SARS-COV-2: ABNORMAL
SARS-COV-2: DETECTED
SOURCE: ABNORMAL

## 2020-12-05 ENCOUNTER — TELEPHONE (OUTPATIENT)
Dept: PRIMARY CARE CLINIC | Age: 50
End: 2020-12-05

## 2020-12-22 ENCOUNTER — HOSPITAL ENCOUNTER (OUTPATIENT)
Age: 50
Discharge: HOME | End: 2020-12-22
Payer: COMMERCIAL

## 2020-12-22 ENCOUNTER — HOSPITAL ENCOUNTER (OUTPATIENT)
Dept: ULTRASOUND IMAGING | Age: 50
Discharge: HOME | End: 2020-12-24
Payer: COMMERCIAL

## 2020-12-22 ENCOUNTER — HOSPITAL ENCOUNTER (OUTPATIENT)
Dept: VASCULAR LAB | Age: 50
Discharge: HOME | End: 2020-12-24
Payer: COMMERCIAL

## 2020-12-22 LAB
ALBUMIN SERPL-MCNC: 4.7 G/DL (ref 3.5–5.2)
ALBUMIN/GLOBULIN RATIO: 1.9 (ref 1–2.5)
ALP BLD-CCNC: 79 U/L (ref 40–129)
ALT SERPL-CCNC: 64 U/L (ref 5–41)
ANION GAP SERPL CALCULATED.3IONS-SCNC: 13 MMOL/L (ref 9–17)
AST SERPL-CCNC: 47 U/L
BILIRUB SERPL-MCNC: 0.61 MG/DL (ref 0.3–1.2)
BUN BLDV-MCNC: 11 MG/DL (ref 6–20)
BUN/CREAT BLD: 12 (ref 9–20)
CALCIUM SERPL-MCNC: 9.6 MG/DL (ref 8.6–10.4)
CHLORIDE BLD-SCNC: 100 MMOL/L (ref 98–107)
CHOLESTEROL/HDL RATIO: 3.8
CHOLESTEROL: 138 MG/DL
CO2: 23 MMOL/L (ref 20–31)
CREAT SERPL-MCNC: 0.92 MG/DL (ref 0.7–1.2)
ESTIMATED AVERAGE GLUCOSE: 128 MG/DL
GFR AFRICAN AMERICAN: >60 ML/MIN
GFR NON-AFRICAN AMERICAN: >60 ML/MIN
GFR SERPL CREATININE-BSD FRML MDRD: ABNORMAL ML/MIN/{1.73_M2}
GFR SERPL CREATININE-BSD FRML MDRD: ABNORMAL ML/MIN/{1.73_M2}
GLUCOSE BLD-MCNC: 125 MG/DL (ref 70–99)
HBA1C MFR BLD: 6.1 % (ref 4–6)
HCT VFR BLD CALC: 50 % (ref 40.7–50.3)
HDLC SERPL-MCNC: 36 MG/DL
HEMOGLOBIN: 15.6 G/DL (ref 13–17)
LDL CHOLESTEROL: 54 MG/DL (ref 0–130)
MCH RBC QN AUTO: 27.5 PG (ref 25.2–33.5)
MCHC RBC AUTO-ENTMCNC: 31.2 G/DL (ref 28.4–34.8)
MCV RBC AUTO: 88 FL (ref 82.6–102.9)
NRBC AUTOMATED: 0 PER 100 WBC
PDW BLD-RTO: 13.2 % (ref 11.8–14.4)
PLATELET # BLD: 229 K/UL (ref 138–453)
PMV BLD AUTO: 9.9 FL (ref 8.1–13.5)
POTASSIUM SERPL-SCNC: 4.5 MMOL/L (ref 3.7–5.3)
PROSTATE SPECIFIC ANTIGEN: 0.69 UG/L
RBC # BLD: 5.68 M/UL (ref 4.21–5.77)
SODIUM BLD-SCNC: 136 MMOL/L (ref 135–144)
TOTAL PROTEIN: 7.2 G/DL (ref 6.4–8.3)
TRIGL SERPL-MCNC: 238 MG/DL
VLDLC SERPL CALC-MCNC: ABNORMAL MG/DL (ref 1–30)
WBC # BLD: 6.7 K/UL (ref 3.5–11.3)

## 2020-12-22 PROCEDURE — 76706 US ABDL AORTA SCREEN AAA: CPT

## 2020-12-22 PROCEDURE — 83036 HEMOGLOBIN GLYCOSYLATED A1C: CPT

## 2020-12-22 PROCEDURE — G0103 PSA SCREENING: HCPCS

## 2020-12-22 PROCEDURE — 80061 LIPID PANEL: CPT

## 2020-12-22 PROCEDURE — 85027 COMPLETE CBC AUTOMATED: CPT

## 2020-12-22 PROCEDURE — 36415 COLL VENOUS BLD VENIPUNCTURE: CPT

## 2020-12-22 PROCEDURE — 93971 EXTREMITY STUDY: CPT

## 2020-12-22 PROCEDURE — 80053 COMPREHEN METABOLIC PANEL: CPT

## 2020-12-23 ENCOUNTER — TELEPHONE (OUTPATIENT)
Dept: CARDIOLOGY | Age: 50
End: 2020-12-23

## 2020-12-23 NOTE — TELEPHONE ENCOUNTER
----- Message from Herberth Ling MD sent at 12/22/2020 11:17 AM EST -----  Test result normal.  No further action needed.

## 2020-12-30 ENCOUNTER — OFFICE VISIT (OUTPATIENT)
Dept: VASCULAR SURGERY | Age: 50
End: 2020-12-30
Payer: COMMERCIAL

## 2020-12-30 VITALS
RESPIRATION RATE: 18 BRPM | SYSTOLIC BLOOD PRESSURE: 129 MMHG | TEMPERATURE: 98 F | DIASTOLIC BLOOD PRESSURE: 89 MMHG | BODY MASS INDEX: 35.32 KG/M2 | HEART RATE: 97 BPM | WEIGHT: 246.7 LBS | HEIGHT: 70 IN

## 2020-12-30 PROCEDURE — 99214 OFFICE O/P EST MOD 30 MIN: CPT | Performed by: INTERNAL MEDICINE

## 2020-12-30 NOTE — PATIENT INSTRUCTIONS
SURVEY:    You may be receiving a survey from [employer] regarding your visit today. Please complete the survey to enable us to provide the highest quality of care to you and your family. If you cannot score us a very good on any question, please call the office to discuss how we could have made your experience a very good one. Thank you. Patient Education        Learning About Coronavirus [phone])  Coronavirus [phone]): Overview  What is coronavirus ([id number])? The coronavirus disease (COVID-19) is caused by a virus. It is an illness that was first found in December 2019. It has since spread worldwide. The virus can cause fever, cough, and trouble breathing. In severe cases, it can cause pneumonia and make it hard to breathe without help. It can cause death. This virus spreads person-to-person through droplets from coughing and sneezing. It can also spread when you are close to someone who is infected. And it can spread when you touch something that has the virus on it, such as a doorknob or a tabletop. Coronaviruses are a large group of viruses. They cause the common cold. They also cause more serious illnesses like Middle East respiratory syndrome (MERS) and severe acute respiratory syndrome (SARS). COVID-19 is caused by a novel coronavirus. That means it's a new type that has not been seen in people before. How is COVID-19 treated? Mild illness can be treated at home, but more serious illness needs to be treated in the hospital. Treatment may include medicines to reduce symptoms, plus breathing support such as oxygen therapy or a ventilator. Other treatments, such as antiviral medicines, may help people who have COVID-19. What can you do to protect yourself from COVID-19? The best way to protect yourself from getting sick is to:  · Avoid areas where there is an outbreak. · Avoid contact with people who may be infected. · Avoid crowds and try to stay at least 6 feet away from other people. · Wash your hands often, especially after you cough or sneeze. Use soap and water, and scrub for at least 20 seconds. If soap and water aren't available, use an alcohol-based hand . · Avoid touching your mouth, nose, and eyes. What can you do to avoid spreading the virus to others? To help avoid spreading the virus to others:  · Wash your hands often with soap or alcohol-based hand sanitizers. · Cover your mouth with a tissue when you cough or sneeze. Then throw the tissue in the trash. · Use a disinfectant to clean things that you touch often. These include doorknobs, remote controls, phones, and handles on your refrigerator and microwave. And don't forget countertops, tabletops, bathrooms, and computer keyboards. · Wear a cloth face cover if you have to go to public areas. If you know or suspect that you have COVID-19:  · Stay home. Don't go to school, work, or public areas. And don't use public transportation, ride-shares, or taxis unless you have no choice. · Leave your home only if you need to get medical care or testing. But call the doctor's office first so they know you're coming. And wear a face cover. · Limit contact with people in your home. If possible, stay in a separate bedroom and use a separate bathroom. · Wear a face cover whenever you're around other people. It can help stop the spread of the virus when you cough or sneeze. · Clean and disinfect your home every day. Use household  and disinfectant wipes or sprays. Take special care to clean things that you grab with your hands. · Self-isolate until it's safe to be around others again. ? If you have symptoms, it's safe when you haven't had a fever for 3 days and your symptoms have improved and it's been at least 10 days since your symptoms started. ? If you were exposed to the virus but don't have symptoms, it's safe to be around others 14 days after exposure. ? Talk to your doctor about whether you also need testing, especially if you have a weakened immune system. When to call for help  Call 911 anytime you think you may need emergency care. For example, call if:  · You have severe trouble breathing. (You can't talk at all.)  · You have constant chest pain or pressure. · You are severely dizzy or lightheaded. · You are confused or can't think clearly. · Your face and lips have a blue color. · You passed out (lost consciousness) or are very hard to wake up. Call your doctor now if you develop symptoms such as:  · Shortness of breath. · Fever. · Cough. If you need to get care, call ahead to the doctor's office for instructions before you go. Make sure you wear a face cover to prevent exposing other people to the virus. Where can you get the latest information? The following health organizations are tracking and studying this virus. Their websites contain the most up-to-date information. Molly Sharma also learn what to do if you think you may have been exposed to the virus. · U.S. Centers for Disease Control and Prevention (CDC): The CDC provides updated news about the disease and travel advice. The website also tells you how to prevent the spread of infection. www.cdc.gov  · World Health Organization [facility]): WHO offers information about the virus outbreaks. WHO also has travel advice. www.who.int  Current as of: July 10, 2020               Content Version: 12.6  © 2006-2020 [employer]. Care instructions adapted under license by [facility] ([facility]). If you have questions about a medical condition or this instruction, always ask your healthcare professional. [address] any warranty or liability for your use of this information.

## 2020-12-30 NOTE — PROGRESS NOTES
Sheree Linares was seen on 12/30/2020 for   Chief Complaint   Patient presents with    Follow-up     both sides of neck painful and swelling   . 11/25/20 1st [facility] visit. Had anterior approach disc fusion 8/27/20 in [facility]. Had trouble breathing so a CT was done 11/10 at [address]. Was found to have likely partial thrombotic occlusion Lij and treated with eliquis. Duration anticipated 3 mo. Not considered flow limiting US has not been done. Some swelling in neck. Dr Gurjit Barbosa is pcp. The clot was detected in the LEFT IJ. Access for surgery was right. He is eager to get off eliquis. Started 11/10/20. Not sure if he's ever had clot before. Had pain in leg 5-10 yrs ago but US was negative. FH is negative for clotting. Father had AAA. 'off and on chest pain\" \"may be related to anxiety\". UPDATE 12/30/20 Feels like his neck is swollen, also axilla (left). US showed no IJ clot. AAA scan showed 2.2 cm aorta. Had covid. No fever or wt loss.        Social History     Socioeconomic History    Marital status:      Spouse name: Not on file    Number of children: Not on file    Years of education: Not on file    Highest education level: Not on file   Occupational History    Occupation:    Social Needs    Financial resource strain: Not hard at all    Food insecurity     Worry: Never true     Inability: Never true    Transportation needs     Medical: No     Non-medical: No   Tobacco Use    Smoking status: Never Smoker    Smokeless tobacco: Never Used   Substance and Sexual Activity    Alcohol use: No    Drug use: No    Sexual activity: Not on file   Lifestyle    Physical activity     Days per week: Not on file     Minutes per session: Not on file    Stress: Not on file   Relationships    Social connections     Talks on phone: Not on file     Gets together: Not on file     Attends Jain service: Not on file     Active member of club or organization: Not on file Attends meetings of clubs or organizations: Not on file     Relationship status: Not on file    Intimate partner violence     Fear of current or ex partner: Not on file     Emotionally abused: Not on file     Physically abused: Not on file     Forced sexual activity: Not on file   Other Topics Concern    Not on file   Social History Narrative    Not on file     Family History   Problem Relation Age of Onset    Depression Mother     Colon Cancer Mother 64    Cancer Mother     COPD Father     Heart Disease Father    [place] Father 78    Other Father         AAA     Heart Defect Sister         septal defect as a baby    Stroke Maternal Grandmother     Other Paternal Grandmother         tremors    Heart Disease Paternal Grandfather      Past Medical History:   Diagnosis Date    Anxiety     Asthma     Brain lesion     on MRI 2011, stable on MRI 2016 - thought to be due to sequela of migraine or mild small vessel ischemic disease    Depression     Essential and other specified forms of tremor     Fatty liver disease, nonalcoholic     GERD (gastroesophageal reflux disease)     H/O echocardiogram 01/05/2016    EF 55%. Mild LV hypertrophy. Mild diastolic dysfunction is seen.  Herniated disc     History of echocardiogram 12/12/2018    EF 60%. LV wall thickness is mildly increased. Mild pulmonic regurg.  History of Holter monitoring 09/20/2016    sinus rhythm with sinus tachycardia 27% of the study duration,average HR 90 bpm ranging between 51 - 147 bpm. Rare isolated PAC's and PVC's    History of stress test 01/05/2016    Relatively normal.  EF 63%. Moise Treadmill score is 7, Low risk for CAD    Hx of tilt table evaluation 08/15/2016    Abnormal.  Heart rate,blood pressure response and symptoms were most consistent with dysautonomia.     Hyperlipidemia     Hypertension     Impaired fasting glucose     Kidney stone     Obstructive sleep apnea (adult) (pediatric) non-compliant with CPAP    Sinus tachycardia      Current Outpatient Medications   Medication Sig Dispense Refill    LIVALO 1 MG TABS tablet TAKE 1 TABLET BY MOUTH EVERY NIGHT 90 tablet 3    midodrine (PROAMATINE) 10 MG tablet TAKE 1 TABLET BY MOUTH 2 TIMES A  tablet 3    omeprazole (PRILOSEC) 40 MG delayed release capsule TAKE 1 CAPSULE BY MOUTH ONCE DAILY 30 capsule 5    apixaban (ELIQUIS) 5 MG TABS tablet Take 1 tablet by mouth 2 times daily Lot # [id number] exp 1/23 14 tablet 0    metoprolol succinate (TOPROL XL) 25 MG extended release tablet TAKE 1 TABLET BY MOUTH 2 TIMES DAILY 180 tablet 3    aspirin (ASPIRIN 81) 81 MG chewable tablet Take 81 mg by mouth daily      SYMBICORT 160-4.5 MCG/ACT AERO INHALE 2 PUFFS BY MOUTH 2 TIMES A DAY. 10.2 g 5     No current facility-administered medications for this visit. Physical findings:  General- no acute distress, oriented  HEENT - no xanthelasma, external ears normal  Neck- Supple, no thyromegaly Neck symmetrical, tender submandibular glands,   Skin- warm, dry, no skin breakdown or gangrene  Extremities - no edema. No palpable epitrochlear nodes        Assessment:  Encounter Diagnosis   Name Primary?  Acute deep vein thrombosis (DVT) of other vein of left upper extremity (HCC) Yes     Normal abdominal aorta  Tender submandibular glands  Plan of care:  Clot has either resolved (or was not present at first). Recommend continue Eliquis until 2/10/20, then stop  No need for addl imaging of aorta  Continue to f/u with Darius Gooden MD, including keeping him posted  Follow up and evaluate.   Prn return       Electronically signed by Jesus Singh MD on 12/30/20 at 2:48 PM EST

## 2020-12-30 NOTE — PROGRESS NOTES
Estrella Saucedo was seen on 12/30/2020 for   Chief Complaint   Patient presents with    Follow-up     both sides of neck painful and swelling   .                             REVIEW OF SYSTEMS    Constitutional Weight: absent, A, Fatigue: absent Fever: absent   HEENT Ears: normal,Visual disturbance: absent Sore throat: absent,    Respiratory Shortness of breath: absent, Cough: present;, Snoring: present   Cardivascular Chest pain: absent,  Leg pain: absent,Leg swelling:absent, Non-healing wound:absent    GI Diarrhea: absent, Abdominal Pain: absent    Urinary frequency: absent, Urinary incontinence: absent   Musculoskeletal Neck pain: present, Back pain: present, Restless Leg:absent     Dermatological Hair loss: absent, Skin changes: absent   Neurological  Sudden Loss of Vision in one eye:absent, Slurred Speech:absent, Weakness on one side of body: absent,Headache: absent   Psychiatric Anxiety: absent, Depression: absent   Hematologic Abnormal bleeding: absent,

## 2021-03-01 ENCOUNTER — HOSPITAL ENCOUNTER (OUTPATIENT)
Age: 51
Discharge: HOME | End: 2021-03-01
Payer: COMMERCIAL

## 2021-03-01 DIAGNOSIS — K92.2 LOWER GI BLEED: ICD-10-CM

## 2021-03-01 DIAGNOSIS — I10 ESSENTIAL HYPERTENSION, BENIGN: ICD-10-CM

## 2021-03-01 DIAGNOSIS — R42 LIGHTHEADEDNESS: ICD-10-CM

## 2021-03-01 LAB
ANION GAP SERPL CALCULATED.3IONS-SCNC: 9 MMOL/L (ref 9–17)
BUN BLDV-MCNC: 10 MG/DL (ref 6–20)
BUN/CREAT BLD: 11 (ref 9–20)
CALCIUM SERPL-MCNC: 9.6 MG/DL (ref 8.6–10.4)
CHLORIDE BLD-SCNC: 101 MMOL/L (ref 98–107)
CO2: 25 MMOL/L (ref 20–31)
CREAT SERPL-MCNC: 0.92 MG/DL (ref 0.7–1.2)
FERRITIN: 158 UG/L (ref 30–400)
GFR AFRICAN AMERICAN: >60 ML/MIN
GFR NON-AFRICAN AMERICAN: >60 ML/MIN
GFR SERPL CREATININE-BSD FRML MDRD: ABNORMAL ML/MIN/{1.73_M2}
GFR SERPL CREATININE-BSD FRML MDRD: ABNORMAL ML/MIN/{1.73_M2}
GLUCOSE BLD-MCNC: 108 MG/DL (ref 70–99)
HCT VFR BLD CALC: 51.2 % (ref 40.7–50.3)
HEMOGLOBIN: 16.5 G/DL (ref 13–17)
IRON: 84 UG/DL (ref 59–158)
MCH RBC QN AUTO: 28.8 PG (ref 25.2–33.5)
MCHC RBC AUTO-ENTMCNC: 32.2 G/DL (ref 28.4–34.8)
MCV RBC AUTO: 89.5 FL (ref 82.6–102.9)
NRBC AUTOMATED: 0 PER 100 WBC
PDW BLD-RTO: 13.7 % (ref 11.8–14.4)
PLATELET # BLD: 166 K/UL (ref 138–453)
PMV BLD AUTO: 10.9 FL (ref 8.1–13.5)
POTASSIUM SERPL-SCNC: 4.7 MMOL/L (ref 3.7–5.3)
RBC # BLD: 5.72 M/UL (ref 4.21–5.77)
SODIUM BLD-SCNC: 135 MMOL/L (ref 135–144)
WBC # BLD: 6.8 K/UL (ref 3.5–11.3)

## 2021-03-01 PROCEDURE — 85027 COMPLETE CBC AUTOMATED: CPT

## 2021-03-01 PROCEDURE — 83540 ASSAY OF IRON: CPT

## 2021-03-01 PROCEDURE — 36415 COLL VENOUS BLD VENIPUNCTURE: CPT

## 2021-03-01 PROCEDURE — 80048 BASIC METABOLIC PNL TOTAL CA: CPT

## 2021-03-01 PROCEDURE — 82728 ASSAY OF FERRITIN: CPT

## 2021-07-14 ENCOUNTER — OFFICE VISIT (OUTPATIENT)
Dept: CARDIOLOGY | Age: 51
End: 2021-07-14
Payer: COMMERCIAL

## 2021-07-14 VITALS
SYSTOLIC BLOOD PRESSURE: 113 MMHG | HEIGHT: 70 IN | RESPIRATION RATE: 18 BRPM | OXYGEN SATURATION: 96 % | WEIGHT: 249.6 LBS | BODY MASS INDEX: 35.73 KG/M2 | DIASTOLIC BLOOD PRESSURE: 71 MMHG | HEART RATE: 89 BPM

## 2021-07-14 DIAGNOSIS — R07.9 CHEST PAIN, UNSPECIFIED TYPE: Primary | ICD-10-CM

## 2021-07-14 DIAGNOSIS — Z82.49 FAMILY HISTORY OF ABDOMINAL AORTIC ANEURYSM (AAA): ICD-10-CM

## 2021-07-14 DIAGNOSIS — E66.9 OBESITY, CLASS II, BMI 35-39.9: ICD-10-CM

## 2021-07-14 DIAGNOSIS — E78.2 MIXED HYPERLIPIDEMIA: ICD-10-CM

## 2021-07-14 DIAGNOSIS — G47.33 OSA (OBSTRUCTIVE SLEEP APNEA): ICD-10-CM

## 2021-07-14 DIAGNOSIS — I25.10 ASHD (ARTERIOSCLEROTIC HEART DISEASE): ICD-10-CM

## 2021-07-14 PROCEDURE — 93000 ELECTROCARDIOGRAM COMPLETE: CPT | Performed by: INTERNAL MEDICINE

## 2021-07-14 PROCEDURE — 99214 OFFICE O/P EST MOD 30 MIN: CPT | Performed by: INTERNAL MEDICINE

## 2021-07-14 NOTE — PROGRESS NOTES
up.  He reports he did have some chest pain, left arm numbness a few weeks ago. He states it did not last long less than a minute and went away. He literally said it was a plank of and I.  He stopped his Eliquis in 2/2021 \" he was treated with Eliquis for jugular vein thrombosis the medicine was later stopped by Dr. Maninder Chang". He said he has done very well over the past several weeks. No further episodes of arm numbness. No significant chest pain. He gets sharp, inframammary pain here and there that is not related to exertion and is not associated with any sweating or diaphoresis. He is just extremely anxious about him having coronary artery disease. He does not use CPAP regularly he tried although it hurt his neck so he stopped using it in March. He is going to talk to Dr. Blank Feldman about that. He also states he has occasional lightheaded/dizziness when he gets up quickly denies any falls or near falls. This has not changed from prior. EKG done today in office 7/14/2021- Normal sinus rhythm. Past Medical History:    Past Medical History:   Diagnosis Date    Anxiety     Asthma     Brain lesion     on MRI 2011, stable on MRI 2016 - thought to be due to sequela of migraine or mild small vessel ischemic disease    Depression     Essential and other specified forms of tremor     Fatty liver disease, nonalcoholic     GERD (gastroesophageal reflux disease)     H/O echocardiogram 01/05/2016    EF 55%. Mild LV hypertrophy. Mild diastolic dysfunction is seen.  Herniated disc     History of echocardiogram 12/12/2018    EF 60%. LV wall thickness is mildly increased. Mild pulmonic regurg.  History of Holter monitoring 09/20/2016    sinus rhythm with sinus tachycardia 27% of the study duration,average HR 90 bpm ranging between 51 - 147 bpm. Rare isolated PAC's and PVC's    History of stress test 01/05/2016    Relatively normal.  EF 63%.  Moise Treadmill score is 7, Low risk for CAD    Hx of tilt table evaluation 08/15/2016    Abnormal.  Heart rate,blood pressure response and symptoms were most consistent with dysautonomia.  Hyperlipidemia     Hypertension     Impaired fasting glucose     Kidney stone     Obstructive sleep apnea (adult) (pediatric)     non-compliant with CPAP    Sinus tachycardia      Past Surgical History:  Past Surgical History:   Procedure Laterality Date    BLADDER STONE REMOVAL  1996    CARDIAC CATHETERIZATION Left 05/12/2017    via right radial approach/ Ashley Pisano/ Dr. Tavo Tavarez. LMCA: Normal 0%. stenosis. LAD: Mild irregularities 10-20%. LCx: Mild irregularities 20-30%. RCA: Normal 0% stenosis. small caliber vessel. EF 55%.  CERVICAL FUSION  08/27/2020    Dr. Carrie Guthrie - [facility]     CHOLECYSTECTOMY  08/2011    COLONOSCOPY  2017    Dr. Fidel Ruiz in [facility]. [address]  2007    Dr. Rachel Hendrickson Left 2017    Dr. Nilton Solomon in [address] [place] [address]  08/2011    Dr. Nilton Solomon in [place] [address] History:    Social History     Tobacco Use   Smoking Status Never Smoker   Smokeless Tobacco Never Used     Current Medications:  Outpatient Medications Marked as Taking for the 7/14/21 encounter (Office Visit) with Dania Banks MD   Medication Sig Dispense Refill    omeprazole (PRILOSEC) 40 MG delayed release capsule TAKE 1 CAPSULE BY MOUTH ONCE DAILY 30 capsule 5    LIVALO 1 MG TABS tablet TAKE 1 TABLET BY MOUTH EVERY NIGHT 90 tablet 3    midodrine (PROAMATINE) 10 MG tablet TAKE 1 TABLET BY MOUTH 2 TIMES A  tablet 3    metoprolol succinate (TOPROL XL) 25 MG extended release tablet TAKE 1 TABLET BY MOUTH 2 TIMES DAILY 180 tablet 3    aspirin (ASPIRIN 81) 81 MG chewable tablet Take 81 mg by mouth daily      SYMBICORT 160-4.5 MCG/ACT AERO INHALE 2 PUFFS BY MOUTH 2 TIMES A DAY.  10.2 g 5       REVIEW OF SYSTEMS:    CONSTITUTIONAL: No major weight gain or loss, fatigue, weakness, night sweats or fever. HEENT: No new vision difficulties or ringing in the ears. RESPIRATORY: See HPI  CARDIOVASCULAR: See HPI  GI: No nausea, vomiting, diarrhea, constipation, abdominal pain or changes in bowel habits. : No urinary frequency, urgency, incontinence hematuria or dysuria. SKIN: No cyanosis or skin lesions. MUSCULOSKELETAL: No new muscle or joint pain. NEUROLOGICAL: No syncope or TIA-like symptoms. PSYCHIATRIC: No anxiety, pain, insomnia or depression    Objective:     PHYSICAL EXAM:      VITALS:    /71 (Site: Left Upper Arm, Position: Sitting, Cuff Size: Large Adult)   Pulse 89   Resp 18   Ht 5' 10\" (1.778 m)   Wt 249 lb 9.6 oz (113.2 kg)   SpO2 96%   BMI 35.81 kg/m²   CONSTITUTIONAL: Cooperative, no apparent distress, and appears well nourished / developed. NEUROLOGIC:  Awake and orientated to person, place and time. PSYCH: Calm affect. SKIN: Warm and dry. HEENT: Sclera non-icteric, normocephalic, neck supple, no elevation of JVP, normal carotid pulses with no bruits and thyroid normal size. LUNGS:  No increased work of breathing and clear to auscultation, no crackles or wheezing. Cardiovascular: Normal rate, regular rhythm, normal heart sounds. Exam reveals no gallop and no friction rubs. No murmur was heard. ABDOMEN:  Normal bowel sounds, non-distended and non-tender to palpation. Extremities: No edema. No cyanosis or clubbing. 2+ radial and carotid pulses. Distal extremity pulses: 2+ bilaterally.     DATA:    Lab Results   Component Value Date    ALT 64 (H) 12/22/2020    AST 47 (H) 12/22/2020    ALKPHOS 79 12/22/2020    BILITOT 0.61 12/22/2020     Lab Results   Component Value Date    CREATININE 0.92 03/01/2021    BUN 10 03/01/2021     03/01/2021    K 4.7 03/01/2021     03/01/2021    CO2 25 03/01/2021     Lab Results   Component Value Date    TSH 1.22 09/14/2018    Y9ZOHWY 10.2 07/16/2012     Lab Results   Component Value Date    WBC 6.8 03/01/2021    HGB 16.5 03/01/2021    HCT 51.2 (H) 03/01/2021    MCV 89.5 03/01/2021     03/01/2021       Lab Results   Component Value Date    TRIG 238 (H) 12/22/2020    TRIG 298 (H) 10/06/2020    TRIG 290 (H) 05/17/2019     Lab Results   Component Value Date    HDL 36 (L) 12/22/2020    HDL 35 (L) 10/06/2020    HDL 41 05/17/2019     Lab Results   Component Value Date    LDLCHOLESTEROL 54 12/22/2020    LDLCHOLESTEROL 52 10/06/2020    LDLCHOLESTEROL 48 05/17/2019       Assessment:      Diagnosis Orders   1. Chest pain, unspecified type     2. ASHD (arteriosclerotic heart disease)     3. Mixed hyperlipidemia     4. Obesity, Class II, BMI 35-39.9     5. WES (obstructive sleep apnea)     6. Family history of abdominal aortic aneurysm (AAA)       Plan:   · Atypical Chest Pain   Antiplatelet Agent: Continue aspirin 81 mg daily. I also reminded him to watch for signs of blood in his stool or black tarry stools and stop the medication immediately if this develops as this could be life threatening. · Beta Blocker Therapy: Continue metoprolol succinate (Toprol XL)  25 mg twice daily    ·  Statin Therapy: Continue Livalo      · Additional Testing: I ordered a treadmill stress test without imaging to assess for myocardial ischemia. · Counseling: I advised Mr. Law Isaac to call our office or go to the emergency room if he develops worsening or persistent chest pain or shortness of breath as this could be life threatening. Atherosclerotic Heart Disease:  Beta Blocker: Continue metoprolol succinate (Toprol XL) 25 mg twice daily. I also discussed the potential side effects of this medication including lightheadedness and dizziness and told him to stop the medication of this occurs and call our office if this occurs. Statin Therapy: Continue Livalo 1 mg daily      Continue aspirin 81 mg daily.   Counseled patient extensively to come to the emergency room if he has any chest pain or worsening shortness of breath at this can be life-threatening. · Hyperlipidemia: Mixed  · Statin Therapy: Continue Livalo 1 mg daily   · Last LDL done on 12/22/2020 was 54 mg/dL          · Obesity: I also briefly discussed both diet and exercise strategies for him to continue to loses weight and he was very receptive to this. I also printed out some simple lifestyle changes that he can make in his diet to help him lose weight. · Suspected Obstructive Sleep Apnea:  o He is trying to use a CPAP machine again. I told him alternatively if he has lost any weight this will help improve his quality of sleep, it will help improve his hemoglobin A1c and blood pressure. · Left internal jugular vein thrombosis by CT scan on 11/10/2020  · Anticoagulation: Completed therapy with Eliquis. The medicine was later stopped by Dr. So Langston. · Family history of abdominal aortic aneurysm. Ultrasound of the aorta on 12/22/2020 showed no evidence of abdominal aortic aneurysm. I reassured him about that. Continue current therapy. Finally, I recommended that he continue his other medications and follow up with you as previously scheduled. FOLLOW UP:   I told Mr. Jenae Wang to call my office if he had any problems, but otherwise told him to Return in about 1 year (around 7/14/2022). However, I would be happy to see him sooner should the need arise. Sincerely,  Kyler Faulkner MD, MS, F.A.C.C. [facility]) [facility], [address] [place] [address]  Phone: [phone], Fax: [phone]    I believe that the risk of significant morbidity and mortality related to the patient's current medical conditions are: intermediate-high. >30 minutes were spent during prep work, discussion and exam of the patient, and follow up documentation and all of their questions were answered. The documentation recorded by the scribe, accurately and completely reflects the services I personally performed and the decisions made by me.  Kyler Faulkner MD, JORJE DE  July 14, 2021

## 2021-07-15 ENCOUNTER — HOSPITAL ENCOUNTER (OUTPATIENT)
Dept: NON INVASIVE DIAGNOSTICS | Age: 51
Discharge: HOME | End: 2021-07-15
Payer: COMMERCIAL

## 2021-07-15 DIAGNOSIS — R07.9 CHEST PAIN, UNSPECIFIED TYPE: ICD-10-CM

## 2021-07-15 DIAGNOSIS — I25.10 ASHD (ARTERIOSCLEROTIC HEART DISEASE): ICD-10-CM

## 2021-07-15 PROCEDURE — 93017 CV STRESS TEST TRACING ONLY: CPT

## 2021-07-16 NOTE — PROCEDURES
[facility], [facility]                              CARDIAC STRESS TEST    PATIENT NAME: Marylee Gee                      :        1970  MED REC NO:   [id number]                              ROOM:  ACCOUNT NO:   [de-identified]                           ADMIT DATE: 07/15/2021  PROVIDER:     Linda Lei    CARDIOVASCULAR DIAGNOSTIC DEPARTMENT    DATE OF STUDY:  07/15/2021    ORDERING PROVIDER:  Akash Zhao. Andressa Martinez MD    PRIMARY CARE PROVIDER:  Jailyn Murray. Edin Castellanos MD    INTERPRETING PHYSICIAN:  Peg Martinez MD    EXERCISE STRESS TEST REPORT    Stress, exercise stress. INDICATIONS:  Assessment of recent chest pain and/or chest discomfort. CLINICAL HISTORY:  The patient is a 75-year-old man with known coronary  artery disease. Previous cardiac history includes:  Coronary artery disease, stress  test, cardiac catheterization. Other previous history includes:  Chest pain, palpitations, dyspnea,  lightheadedness. Symptoms just prior to testing include:  None. Relevant medications:  Metoprolol. PROCEDURE:  The patient performed treadmill exercise using a Gordy  protocol, completing 7:09 minutes and completing an estimated workload  of 6.67 metabolic equivalents (METS). The test was terminated due to leg pain. The heart rate was 86 beats per minute at baseline and increased to 148  beats at peak exercise, which was 88% of the maximum predicted heart  rate. The rest blood pressure was 120/60 mm/Hg and increased to 150/80  mm/Hg, which is a normal response. During the procedure, the patient  developed fatigue, shortness of breath and leg pain, but denied chest  discomfort. STRESS ECG RESULTS:  The resting electrocardiogram demonstrated normal  sinus rhythm without definitive ST-segment abnormalities suggestive of  myocardial ischemia.     At peak exercise and during recovery, the patient developed:    Up-sloping ST segment changes in leads II, III, AVF, V4, V5, V6, which  did not meet diagnostic criteria for myocardial ischemia with no  premature atrial contractions (PACs) and occasional premature  ventricular contractions (PVCs). IMPRESSION:  No significant electrocardiographic evidence of myocardial ischemia  during EKG monitoring without significant associated arrhythmias. The patient's Duke Treadmill score is 5 which correlates with a low risk  for significant coronary artery disease. The sensitivity for detecting ischemia on this test may have been  reduced due to the patient being on a beta blocker.           Balaji Wills    D: 07/16/2021 8:05:37       T: 07/16/2021 8:06:54     [email or web address]  Job#: [id number]     Doc#: Unknown    CC:  Heavenly Crump

## 2021-07-19 ENCOUNTER — TELEPHONE (OUTPATIENT)
Dept: CARDIOLOGY | Age: 51
End: 2021-07-19

## 2021-07-19 NOTE — TELEPHONE ENCOUNTER
----- Message from Iván Núñez MD sent at 7/17/2021  9:07 AM EDT -----  Test result normal.  No further action needed.

## 2021-08-01 LAB
AVERAGE GLUCOSE: NORMAL
HBA1C MFR BLD: 6.1 %

## 2021-11-19 ENCOUNTER — HOSPITAL ENCOUNTER (EMERGENCY)
Age: 51
Discharge: HOME | End: 2021-11-19
Payer: COMMERCIAL

## 2021-11-19 ENCOUNTER — APPOINTMENT (OUTPATIENT)
Dept: CT IMAGING | Age: 51
End: 2021-11-19
Payer: COMMERCIAL

## 2021-11-19 VITALS
DIASTOLIC BLOOD PRESSURE: 92 MMHG | RESPIRATION RATE: 20 BRPM | HEART RATE: 89 BPM | TEMPERATURE: 97.9 F | SYSTOLIC BLOOD PRESSURE: 124 MMHG | OXYGEN SATURATION: 95 %

## 2021-11-19 DIAGNOSIS — R51.9 NONINTRACTABLE HEADACHE, UNSPECIFIED CHRONICITY PATTERN, UNSPECIFIED HEADACHE TYPE: Primary | ICD-10-CM

## 2021-11-19 LAB
ABSOLUTE EOS #: 0.34 K/UL (ref 0–0.44)
ABSOLUTE IMMATURE GRANULOCYTE: 0.04 K/UL (ref 0–0.3)
ABSOLUTE LYMPH #: 3.04 K/UL (ref 1.1–3.7)
ABSOLUTE MONO #: 0.9 K/UL (ref 0.1–1.2)
ANION GAP SERPL CALCULATED.3IONS-SCNC: 15 MMOL/L (ref 9–17)
BASOPHILS # BLD: 1 % (ref 0–2)
BASOPHILS ABSOLUTE: 0.08 K/UL (ref 0–0.2)
BUN BLDV-MCNC: 17 MG/DL (ref 6–20)
BUN/CREAT BLD: 17 (ref 9–20)
CALCIUM SERPL-MCNC: 9.7 MG/DL (ref 8.6–10.4)
CHLORIDE BLD-SCNC: 99 MMOL/L (ref 98–107)
CO2: 21 MMOL/L (ref 20–31)
CREAT SERPL-MCNC: 1.03 MG/DL (ref 0.7–1.2)
DIFFERENTIAL TYPE: ABNORMAL
EOSINOPHILS RELATIVE PERCENT: 4 % (ref 1–4)
GFR AFRICAN AMERICAN: >60 ML/MIN
GFR NON-AFRICAN AMERICAN: >60 ML/MIN
GFR SERPL CREATININE-BSD FRML MDRD: ABNORMAL ML/MIN/{1.73_M2}
GFR SERPL CREATININE-BSD FRML MDRD: ABNORMAL ML/MIN/{1.73_M2}
GLUCOSE BLD-MCNC: 110 MG/DL (ref 70–99)
GLUCOSE BLD-MCNC: 122 MG/DL
GLUCOSE BLD-MCNC: 122 MG/DL (ref 74–100)
HCT VFR BLD CALC: 48.3 % (ref 40.7–50.3)
HEMOGLOBIN: 15.7 G/DL (ref 13–17)
IMMATURE GRANULOCYTES: 1 %
LYMPHOCYTES # BLD: 35 % (ref 24–43)
MCH RBC QN AUTO: 28.9 PG (ref 25.2–33.5)
MCHC RBC AUTO-ENTMCNC: 32.5 G/DL (ref 28.4–34.8)
MCV RBC AUTO: 88.8 FL (ref 82.6–102.9)
MONOCYTES # BLD: 10 % (ref 3–12)
NRBC AUTOMATED: 0 PER 100 WBC
PDW BLD-RTO: 13.4 % (ref 11.8–14.4)
PLATELET # BLD: 262 K/UL (ref 138–453)
PLATELET ESTIMATE: ABNORMAL
PMV BLD AUTO: 10.8 FL (ref 8.1–13.5)
POTASSIUM SERPL-SCNC: 4.3 MMOL/L (ref 3.7–5.3)
RBC # BLD: 5.44 M/UL (ref 4.21–5.77)
RBC # BLD: ABNORMAL 10*6/UL
SARS-COV-2, RAPID: NOT DETECTED
SEG NEUTROPHILS: 49 % (ref 36–65)
SEGMENTED NEUTROPHILS ABSOLUTE COUNT: 4.22 K/UL (ref 1.5–8.1)
SODIUM BLD-SCNC: 135 MMOL/L (ref 135–144)
SPECIMEN DESCRIPTION: NORMAL
TROPONIN INTERP: NORMAL
TROPONIN T: NORMAL NG/ML
TROPONIN, HIGH SENSITIVITY: 14 NG/L (ref 0–22)
WBC # BLD: 8.6 K/UL (ref 3.5–11.3)
WBC # BLD: ABNORMAL 10*3/UL

## 2021-11-19 PROCEDURE — 84484 ASSAY OF TROPONIN QUANT: CPT

## 2021-11-19 PROCEDURE — 82947 ASSAY GLUCOSE BLOOD QUANT: CPT

## 2021-11-19 PROCEDURE — 93005 ELECTROCARDIOGRAM TRACING: CPT

## 2021-11-19 PROCEDURE — 87635 SARS-COV-2 COVID-19 AMP PRB: CPT

## 2021-11-19 PROCEDURE — 6360000002 HC RX W HCPCS: Performed by: PHYSICIAN ASSISTANT

## 2021-11-19 PROCEDURE — 70450 CT HEAD/BRAIN W/O DYE: CPT

## 2021-11-19 PROCEDURE — 36415 COLL VENOUS BLD VENIPUNCTURE: CPT

## 2021-11-19 PROCEDURE — 80048 BASIC METABOLIC PNL TOTAL CA: CPT

## 2021-11-19 PROCEDURE — 96375 TX/PRO/DX INJ NEW DRUG ADDON: CPT

## 2021-11-19 PROCEDURE — 99282 EMERGENCY DEPT VISIT SF MDM: CPT

## 2021-11-19 PROCEDURE — 96374 THER/PROPH/DIAG INJ IV PUSH: CPT

## 2021-11-19 PROCEDURE — 85025 COMPLETE CBC W/AUTO DIFF WBC: CPT

## 2021-11-19 PROCEDURE — 2580000003 HC RX 258: Performed by: PHYSICIAN ASSISTANT

## 2021-11-19 RX ORDER — DEXAMETHASONE SODIUM PHOSPHATE 10 MG/ML
10 INJECTION INTRAMUSCULAR; INTRAVENOUS ONCE
Status: COMPLETED | OUTPATIENT
Start: 2021-11-19 | End: 2021-11-19

## 2021-11-19 RX ORDER — 0.9 % SODIUM CHLORIDE 0.9 %
1000 INTRAVENOUS SOLUTION INTRAVENOUS ONCE
Status: COMPLETED | OUTPATIENT
Start: 2021-11-19 | End: 2021-11-19

## 2021-11-19 RX ORDER — DIPHENHYDRAMINE HYDROCHLORIDE 50 MG/ML
25 INJECTION INTRAMUSCULAR; INTRAVENOUS ONCE
Status: COMPLETED | OUTPATIENT
Start: 2021-11-19 | End: 2021-11-19

## 2021-11-19 RX ADMIN — SODIUM CHLORIDE 1000 ML: 9 INJECTION, SOLUTION INTRAVENOUS at 15:20

## 2021-11-19 RX ADMIN — DEXAMETHASONE SODIUM PHOSPHATE 10 MG: 10 INJECTION INTRAMUSCULAR; INTRAVENOUS at 15:21

## 2021-11-19 RX ADMIN — DIPHENHYDRAMINE HYDROCHLORIDE 25 MG: 50 INJECTION, SOLUTION INTRAMUSCULAR; INTRAVENOUS at 15:21

## 2021-11-19 ASSESSMENT — ENCOUNTER SYMPTOMS
GASTROINTESTINAL NEGATIVE: 1
RESPIRATORY NEGATIVE: 1
EYES NEGATIVE: 1

## 2021-11-19 NOTE — LETTER
[facility]  [address] [place] [address] [place] [address]  Phone: [phone]  Fax: [phone]               November 19, 2021    Patient: Madison Decker   YOB: 1970   Date of Visit: 11/19/2021       To Whom It May Concern:    Salazar Stuart was seen and treated in our emergency department on 11/19/2021. He may return on 11/22/2021 .       Sincerely,       Michael Linares RN         Signature:__________________________________

## 2021-11-19 NOTE — ED PROVIDER NOTES
[address] [facility] ED  EMERGENCY DEPARTMENT ENCOUNTER      Pt Name: Alphonza Bamberger  MRN: [id number]  [place] 1970  Date of evaluation: 11/19/2021  Provider: EMMIE Perez PA-C    CHIEF COMPLAINT     Chief Complaint   Patient presents with    Fatigue     reports feeling fatigued and disoriented         HISTORY OF PRESENT ILLNESS   (Location/Symptom, Timing/Onset, Context/Setting,Quality, Duration, Modifying Factors, Severity)  Note limiting factors. Alphonza Bamberger is a53 y.o. male who presents to the emergency department      66-year-old male presents here with chief complaint of fatigue mild headache. He states he has had pain and a headache for the last several days. He states he feels as if his head is outside of his body. He is alert and oriented no acute distress. Denies known history of Covid. He states he has had a history the past 12 \"lesions on his brain scan\". He has had no excessive fevers. Denies worse like of his life sudden onset or thunderclap sensation. He has no focal neurological deficits. No nuchal rigidity. He states he came here today for evaluation of headache and fatigue. Patient had a history of a cardiac catheterization in the past.  He has had no chest pain no shortness of breath. Nursing Notes werereviewed. REVIEW OF SYSTEMS    (2-9 systems for level 4, 10 or more for level 5)     Review of Systems   Constitutional: Positive for fatigue. HENT: Negative. Eyes: Negative. Respiratory: Negative. Cardiovascular: Negative. Gastrointestinal: Negative. Endocrine: Negative. Genitourinary: Negative. Musculoskeletal: Positive for myalgias. Neurological: Positive for headaches. Psychiatric/Behavioral: Negative. All other systems reviewed and are negative. Except as noted above the remainder of the review of systems was reviewed and negative.        PAST MEDICAL HISTORY     Past Medical History:   Diagnosis Date    Anxiety     Asthma  Brain lesion     on MRI 2011, stable on MRI 2016 - thought to be due to sequela of migraine or mild small vessel ischemic disease    Depression     Essential and other specified forms of tremor     Fatty liver disease, nonalcoholic     GERD (gastroesophageal reflux disease)     H/O echocardiogram 01/05/2016    EF 55%. Mild LV hypertrophy. Mild diastolic dysfunction is seen.  Herniated disc     History of echocardiogram 12/12/2018    EF 60%. LV wall thickness is mildly increased. Mild pulmonic regurg.  History of Holter monitoring 09/20/2016    sinus rhythm with sinus tachycardia 27% of the study duration,average HR 90 bpm ranging between 51 - 147 bpm. Rare isolated PAC's and PVC's    History of stress test 01/05/2016    Relatively normal.  EF 63%. Moise Treadmill score is 7, Low risk for CAD    Hx of tilt table evaluation 08/15/2016    Abnormal.  Heart rate,blood pressure response and symptoms were most consistent with dysautonomia.  Hyperlipidemia     Hypertension     Impaired fasting glucose     Kidney stone     Obstructive sleep apnea (adult) (pediatric)     non-compliant with CPAP    Sinus tachycardia          SURGICALHISTORY       Past Surgical History:   Procedure Laterality Date    BLADDER STONE REMOVAL  1996    CARDIAC CATHETERIZATION Left 05/12/2017    via right radial approach/ Ashley Pisano/ Dr. Lianna Damon. LMCA: Normal 0%. stenosis. LAD: Mild irregularities 10-20%. LCx: Mild irregularities 20-30%. RCA: Normal 0% stenosis. small caliber vessel. EF 55%.     CERVICAL FUSION  08/27/2020    Dr. Alicia Persaud [facility]     CHOLECYSTECTOMY  08/2011    COLONOSCOPY  2017    [facility]. Yari Mark in [facility] - negative     Rosi Vick  2007    Dr. Aguilar Tony Left 2017    Dr. Ashanti Kang in [facility]  08/2011    Dr. Ashanti Kang in [address]       Previous Medications    ALBUTEROL SULFATE HFA Transportation Needs:     Lack of Transportation (Medical): Not on file    Lack of Transportation (Non-Medical): Not on file   Physical Activity:     Days of Exercise per Week: Not on file    Minutes of Exercise per Session: Not on file   Stress:     Feeling of Stress : Not on file   Social Connections:     Frequency of Communication with Friends and Family: Not on file    Frequency of Social Gatherings with Friends and Family: Not on file    Attends Cheondoism Services: Not on file    Active Member of [address] [place] [address] or Organizations: Not on file    Attends Club or Organization Meetings: Not on file    Marital Status: Not on file   Intimate Partner Violence:     Fear of Current or Ex-Partner: Not on file    Emotionally Abused: Not on file    Physically Abused: Not on file    Sexually Abused: Not on file   Housing Stability:     Unable to Pay for Housing in the Last Year: Not on file    Number of [place] in the Last Year: Not on file    Unstable Housing in the Last Year: Not on file       SCREENINGS             PHYSICAL EXAM    (up to 7 for level 4, 8 or more for level 5)     ED Triage Vitals [11/19/21 1104]   BP Temp Temp Source Pulse Resp SpO2 Height Weight   (!) 124/92 97.9 °F (36.6 °C) Tympanic 89 20 95 % -- --       Physical Exam  Vitals and nursing note reviewed. Constitutional:       Appearance: Normal appearance. HENT:      Head: Normocephalic and atraumatic. Right Ear: Tympanic membrane and ear canal normal.      Left Ear: Tympanic membrane and ear canal normal.      Nose: Nose normal.      Mouth/Throat:      Mouth: Mucous membranes are dry. Pharynx: Oropharynx is clear. Eyes:      Extraocular Movements: Extraocular movements intact. Conjunctiva/sclera: Conjunctivae normal.      Pupils: Pupils are equal, round, and reactive to light. Cardiovascular:      Rate and Rhythm: Normal rate and regular rhythm.    Pulmonary:      Effort: Pulmonary effort is normal.      Breath sounds: Normal breath sounds. Abdominal:      General: Abdomen is flat. Bowel sounds are normal.      Palpations: Abdomen is soft. Musculoskeletal:         General: Normal range of motion. Cervical back: Normal range of motion and neck supple. Skin:     General: Skin is warm and dry. Capillary Refill: Capillary refill takes 2 to 3 seconds. Neurological:      General: No focal deficit present. Mental Status: He is alert and oriented to person, place, and time. Mental status is at baseline. Psychiatric:         Mood and Affect: Mood normal.         Behavior: Behavior normal.         Thought Content: Thought content normal.         Judgment: Judgment normal.         DIAGNOSTIC RESULTS     EKG: All EKG's are interpreted by the Emergency Department Physician who either signs orCo-signs this chart in the absence of a cardiologist.      RADIOLOGY:   Non-plainfilm images such as CT, Ultrasound and MRI are read by the radiologist. Plain radiographic images are visualized and preliminarily interpreted by the emergency physician with the below findings:      Interpretationper the Radiologist below, if available at the time of this note:    CT Head WO Contrast   Final Result   Negative CT brain with no acute intracranial abnormality.                ED BEDSIDE ULTRASOUND:   Performed by ED Physician - none    LABS:  Labs Reviewed   GLUCOSE, WHOLE BLOOD - Abnormal; Notable for the following components:       Result Value    POC Glucose 122 (*)     All other components within normal limits   CBC WITH AUTO DIFFERENTIAL - Abnormal; Notable for the following components:    Immature Granulocytes 1 (*)     All other components within normal limits   BASIC METABOLIC PANEL W/ REFLEX TO MG FOR LOW K - Abnormal; Notable for the following components:    Glucose 110 (*)     All other components within normal limits   POCT GLUCOSE - Normal   COVID-19, RAPID   TROPONIN       All other labs were within normal range or not returned as of this dictation. EMERGENCY DEPARTMENT COURSE and DIFFERENTIAL DIAGNOSIS/MDM:   Vitals:    Vitals:    11/19/21 1104   BP: (!) 124/92   Pulse: 89   Resp: 20   Temp: 97.9 °F (36.6 °C)   TempSrc: Tympanic   SpO2: 95%         MDM  Number of Diagnoses or Management Options  Diagnosis management comments: Patient presented here chief complaint of fatigue. Headache. Covid swab is negative. CBC BMP and troponin are also unremarkable. Patient's EKG is within normal limits. Patient was given IV fluids here and Zofran. He states his symptoms have improved. He states he was concerned because he had a headache. CT scan was also read negative today. Believe symptoms may be due to dehydration or viral illness. Patient is otherwise healthy no focal neurologic deficits are appreciated. She denies worse headache of his life sudden onset of thunderclap sensation. Instructed follow-up primary care physician and also cardiologist.            Procedures    FINAL IMPRESSION      1. Nonintractable headache, unspecified chronicity pattern, unspecified headache type        DISPOSITION/PLAN   DISPOSITION        PATIENT REFERRED TO:  No follow-up provider specified. DISCHARGE MEDICATIONS:  New Prescriptions    No medications on file              Summation      Patient Course:      ED Medications administered this visit:    Medications   0.9 % sodium chloride bolus (0 mLs IntraVENous Stopped 11/19/21 1703)   dexamethasone (DECADRON) injection 10 mg (10 mg IntraVENous Given 11/19/21 1521)   diphenhydrAMINE (BENADRYL) injection 25 mg (25 mg IntraVENous Given 11/19/21 1521)       New Prescriptions from this visit:    New Prescriptions    No medications on file       Follow-up:  No follow-up provider specified. Final Impression:   1.  Nonintractable headache, unspecified chronicity pattern, unspecified headache type               (Please note that portions of this note were completed with a voice recognition

## 2021-11-20 LAB
EKG ATRIAL RATE: 74 BPM
EKG P AXIS: 54 DEGREES
EKG P-R INTERVAL: 148 MS
EKG Q-T INTERVAL: 386 MS
EKG QRS DURATION: 80 MS
EKG QTC CALCULATION (BAZETT): 428 MS
EKG R AXIS: 2 DEGREES
EKG T AXIS: 45 DEGREES
EKG VENTRICULAR RATE: 74 BPM

## 2021-12-22 RX ORDER — MIDODRINE HYDROCHLORIDE 10 MG/1
10 TABLET ORAL 2 TIMES DAILY
Qty: 180 TABLET | Refills: 3 | Status: SHIPPED | OUTPATIENT
Start: 2021-12-22

## 2021-12-22 RX ORDER — PITAVASTATIN CALCIUM 1.04 MG/1
1 TABLET, FILM COATED ORAL NIGHTLY
Qty: 90 TABLET | Refills: 3 | Status: SHIPPED | OUTPATIENT
Start: 2021-12-22

## 2022-02-01 ENCOUNTER — HOSPITAL ENCOUNTER (OUTPATIENT)
Age: 52
Discharge: HOME | End: 2022-02-01
Payer: COMMERCIAL

## 2022-02-01 DIAGNOSIS — E78.2 MIXED HYPERLIPIDEMIA: ICD-10-CM

## 2022-02-01 DIAGNOSIS — Z12.5 SCREENING PSA (PROSTATE SPECIFIC ANTIGEN): ICD-10-CM

## 2022-02-01 DIAGNOSIS — I10 ESSENTIAL HYPERTENSION, BENIGN: ICD-10-CM

## 2022-02-01 DIAGNOSIS — R73.01 IMPAIRED FASTING GLUCOSE: ICD-10-CM

## 2022-02-01 LAB
ALT SERPL-CCNC: 60 U/L (ref 5–41)
ANION GAP SERPL CALCULATED.3IONS-SCNC: 16 MMOL/L (ref 9–17)
AST SERPL-CCNC: 40 U/L
BUN BLDV-MCNC: 11 MG/DL (ref 6–20)
BUN/CREAT BLD: 11 (ref 9–20)
CALCIUM SERPL-MCNC: 9.6 MG/DL (ref 8.6–10.4)
CHLORIDE BLD-SCNC: 102 MMOL/L (ref 98–107)
CO2: 21 MMOL/L (ref 20–31)
CREAT SERPL-MCNC: 1.02 MG/DL (ref 0.7–1.2)
GFR AFRICAN AMERICAN: >60 ML/MIN
GFR NON-AFRICAN AMERICAN: >60 ML/MIN
GFR SERPL CREATININE-BSD FRML MDRD: ABNORMAL ML/MIN/{1.73_M2}
GFR SERPL CREATININE-BSD FRML MDRD: ABNORMAL ML/MIN/{1.73_M2}
GLUCOSE BLD-MCNC: 111 MG/DL (ref 70–99)
HCT VFR BLD CALC: 49.5 % (ref 40.7–50.3)
HEMOGLOBIN: 16 G/DL (ref 13–17)
MCH RBC QN AUTO: 29.1 PG (ref 25.2–33.5)
MCHC RBC AUTO-ENTMCNC: 32.3 G/DL (ref 28.4–34.8)
MCV RBC AUTO: 90.2 FL (ref 82.6–102.9)
NRBC AUTOMATED: 0 PER 100 WBC
PDW BLD-RTO: 13.4 % (ref 11.8–14.4)
PLATELET # BLD: 211 K/UL (ref 138–453)
PMV BLD AUTO: 10.5 FL (ref 8.1–13.5)
POTASSIUM SERPL-SCNC: 4.6 MMOL/L (ref 3.7–5.3)
RBC # BLD: 5.49 M/UL (ref 4.21–5.77)
SODIUM BLD-SCNC: 139 MMOL/L (ref 135–144)
WBC # BLD: 6.6 K/UL (ref 3.5–11.3)

## 2022-02-01 PROCEDURE — 36415 COLL VENOUS BLD VENIPUNCTURE: CPT

## 2022-02-01 PROCEDURE — 84450 TRANSFERASE (AST) (SGOT): CPT

## 2022-02-01 PROCEDURE — 80061 LIPID PANEL: CPT

## 2022-02-01 PROCEDURE — 84460 ALANINE AMINO (ALT) (SGPT): CPT

## 2022-02-01 PROCEDURE — 83036 HEMOGLOBIN GLYCOSYLATED A1C: CPT

## 2022-02-01 PROCEDURE — G0103 PSA SCREENING: HCPCS

## 2022-02-01 PROCEDURE — 85027 COMPLETE CBC AUTOMATED: CPT

## 2022-02-01 PROCEDURE — 80048 BASIC METABOLIC PNL TOTAL CA: CPT

## 2022-02-02 LAB
CHOLESTEROL/HDL RATIO: 3.7
CHOLESTEROL: 142 MG/DL
ESTIMATED AVERAGE GLUCOSE: 146 MG/DL
HBA1C MFR BLD: 6.7 % (ref 4–6)
HDLC SERPL-MCNC: 38 MG/DL
LDL CHOLESTEROL: 68 MG/DL (ref 0–130)
PROSTATE SPECIFIC ANTIGEN: 0.63 UG/L
TRIGL SERPL-MCNC: 182 MG/DL
VLDLC SERPL CALC-MCNC: ABNORMAL MG/DL (ref 1–30)

## 2022-02-21 ENCOUNTER — HOSPITAL ENCOUNTER (OUTPATIENT)
Dept: LAB | Age: 52
Setting detail: SPECIMEN
Discharge: HOME | End: 2022-02-21
Payer: COMMERCIAL

## 2022-02-21 DIAGNOSIS — R09.81 NASAL CONGESTION: ICD-10-CM

## 2022-02-21 DIAGNOSIS — J02.9 PHARYNGITIS, UNSPECIFIED ETIOLOGY: ICD-10-CM

## 2022-02-21 LAB
ADENOVIRUS PCR: NOT DETECTED
BORDETELLA PARAPERTUSSIS: NOT DETECTED
BORDETELLA PERTUSSIS PCR: NOT DETECTED
CHLAMYDIA PNEUMONIAE BY PCR: NOT DETECTED
CORONAVIRUS 229E PCR: DETECTED
CORONAVIRUS HKU1 PCR: NOT DETECTED
CORONAVIRUS NL63 PCR: NOT DETECTED
CORONAVIRUS OC43 PCR: NOT DETECTED
HUMAN METAPNEUMOVIRUS PCR: NOT DETECTED
INFLUENZA A BY PCR: NOT DETECTED
INFLUENZA B BY PCR: NOT DETECTED
MYCOPLASMA PNEUMONIAE PCR: NOT DETECTED
PARAINFLUENZA 1 PCR: NOT DETECTED
PARAINFLUENZA 2 PCR: NOT DETECTED
PARAINFLUENZA 3 PCR: NOT DETECTED
PARAINFLUENZA 4 PCR: NOT DETECTED
RESP SYNCYTIAL VIRUS PCR: NOT DETECTED
RHINO/ENTEROVIRUS PCR: NOT DETECTED
SARS-COV-2, PCR: NOT DETECTED
SPECIMEN DESCRIPTION: ABNORMAL

## 2022-02-21 PROCEDURE — C9803 HOPD COVID-19 SPEC COLLECT: HCPCS

## 2022-02-21 PROCEDURE — 0202U NFCT DS 22 TRGT SARS-COV-2: CPT

## 2022-06-14 ENCOUNTER — HOSPITAL ENCOUNTER (OUTPATIENT)
Dept: GENERAL RADIOLOGY | Age: 52
Discharge: HOME | End: 2022-06-16
Payer: COMMERCIAL

## 2022-06-14 ENCOUNTER — HOSPITAL ENCOUNTER (OUTPATIENT)
Age: 52
Discharge: HOME | End: 2022-06-16
Payer: COMMERCIAL

## 2022-06-14 DIAGNOSIS — R10.9 ABDOMINAL PAIN, UNSPECIFIED ABDOMINAL LOCATION: ICD-10-CM

## 2022-06-14 PROCEDURE — 74019 RADEX ABDOMEN 2 VIEWS: CPT

## 2022-06-25 ENCOUNTER — HOSPITAL ENCOUNTER (EMERGENCY)
Age: 52
Discharge: HOME | End: 2022-06-25
Attending: STUDENT IN AN ORGANIZED HEALTH CARE EDUCATION/TRAINING PROGRAM
Payer: COMMERCIAL

## 2022-06-25 VITALS
BODY MASS INDEX: 35.15 KG/M2 | DIASTOLIC BLOOD PRESSURE: 88 MMHG | OXYGEN SATURATION: 95 % | TEMPERATURE: 96.7 F | HEART RATE: 76 BPM | SYSTOLIC BLOOD PRESSURE: 118 MMHG | WEIGHT: 245 LBS | RESPIRATION RATE: 14 BRPM

## 2022-06-25 DIAGNOSIS — R10.9 RIGHT SIDED ABDOMINAL PAIN: Primary | ICD-10-CM

## 2022-06-25 LAB
-: ABNORMAL
ABSOLUTE EOS #: 0.25 K/UL (ref 0–0.44)
ABSOLUTE IMMATURE GRANULOCYTE: 0.03 K/UL (ref 0–0.3)
ABSOLUTE LYMPH #: 2.41 K/UL (ref 1.1–3.7)
ABSOLUTE MONO #: 0.9 K/UL (ref 0.1–1.2)
ALBUMIN SERPL-MCNC: 4.3 G/DL (ref 3.5–5.2)
ALBUMIN/GLOBULIN RATIO: 1.8 (ref 1–2.5)
ALP BLD-CCNC: 76 U/L (ref 40–129)
ALT SERPL-CCNC: 53 U/L (ref 5–41)
ANION GAP SERPL CALCULATED.3IONS-SCNC: 14 MMOL/L (ref 9–17)
AST SERPL-CCNC: 30 U/L
BACTERIA: ABNORMAL
BASOPHILS # BLD: 1 % (ref 0–2)
BASOPHILS ABSOLUTE: 0.06 K/UL (ref 0–0.2)
BILIRUB SERPL-MCNC: 0.41 MG/DL (ref 0.3–1.2)
BILIRUBIN URINE: NEGATIVE
BUN BLDV-MCNC: 13 MG/DL (ref 6–20)
BUN/CREAT BLD: 13 (ref 9–20)
CALCIUM SERPL-MCNC: 9.2 MG/DL (ref 8.6–10.4)
CHLORIDE BLD-SCNC: 100 MMOL/L (ref 98–107)
CO2: 22 MMOL/L (ref 20–31)
COLOR: YELLOW
CREAT SERPL-MCNC: 0.98 MG/DL (ref 0.7–1.2)
EOSINOPHILS RELATIVE PERCENT: 3 % (ref 1–4)
EPITHELIAL CELLS UA: ABNORMAL /HPF (ref 0–5)
GFR AFRICAN AMERICAN: >60 ML/MIN
GFR NON-AFRICAN AMERICAN: >60 ML/MIN
GFR SERPL CREATININE-BSD FRML MDRD: ABNORMAL ML/MIN/{1.73_M2}
GFR SERPL CREATININE-BSD FRML MDRD: ABNORMAL ML/MIN/{1.73_M2}
GLUCOSE BLD-MCNC: 140 MG/DL (ref 70–99)
GLUCOSE URINE: NEGATIVE
HCT VFR BLD CALC: 47.7 % (ref 40.7–50.3)
HEMOGLOBIN: 15.5 G/DL (ref 13–17)
IMMATURE GRANULOCYTES: 0 %
KETONES, URINE: NEGATIVE
LEUKOCYTE ESTERASE, URINE: NEGATIVE
LIPASE: 54 U/L (ref 13–60)
LYMPHOCYTES # BLD: 29 % (ref 24–43)
MCH RBC QN AUTO: 29.1 PG (ref 25.2–33.5)
MCHC RBC AUTO-ENTMCNC: 32.5 G/DL (ref 28.4–34.8)
MCV RBC AUTO: 89.7 FL (ref 82.6–102.9)
MONOCYTES # BLD: 11 % (ref 3–12)
MUCUS: ABNORMAL
NITRITE, URINE: NEGATIVE
NRBC AUTOMATED: 0 PER 100 WBC
PDW BLD-RTO: 13.7 % (ref 11.8–14.4)
PH UA: 6 (ref 5–9)
PLATELET # BLD: 227 K/UL (ref 138–453)
PMV BLD AUTO: 10.5 FL (ref 8.1–13.5)
POTASSIUM SERPL-SCNC: 4.1 MMOL/L (ref 3.7–5.3)
PROTEIN UA: NEGATIVE
RBC # BLD: 5.32 M/UL (ref 4.21–5.77)
RBC UA: ABNORMAL /HPF (ref 0–2)
SEG NEUTROPHILS: 56 % (ref 36–65)
SEGMENTED NEUTROPHILS ABSOLUTE COUNT: 4.7 K/UL (ref 1.5–8.1)
SODIUM BLD-SCNC: 136 MMOL/L (ref 135–144)
SPECIFIC GRAVITY UA: 1.02 (ref 1.01–1.02)
TOTAL PROTEIN: 6.7 G/DL (ref 6.4–8.3)
TURBIDITY: CLEAR
URINE HGB: NEGATIVE
UROBILINOGEN, URINE: NORMAL
WBC # BLD: 8.4 K/UL (ref 3.5–11.3)
WBC UA: ABNORMAL /HPF (ref 0–5)

## 2022-06-25 PROCEDURE — 85025 COMPLETE CBC W/AUTO DIFF WBC: CPT

## 2022-06-25 PROCEDURE — 99283 EMERGENCY DEPT VISIT LOW MDM: CPT

## 2022-06-25 PROCEDURE — 36415 COLL VENOUS BLD VENIPUNCTURE: CPT

## 2022-06-25 PROCEDURE — 81001 URINALYSIS AUTO W/SCOPE: CPT

## 2022-06-25 PROCEDURE — 83690 ASSAY OF LIPASE: CPT

## 2022-06-25 PROCEDURE — 80053 COMPREHEN METABOLIC PANEL: CPT

## 2022-06-25 RX ORDER — KETOROLAC TROMETHAMINE 15 MG/ML
15 INJECTION, SOLUTION INTRAMUSCULAR; INTRAVENOUS ONCE
Status: DISCONTINUED | OUTPATIENT
Start: 2022-06-25 | End: 2022-06-25 | Stop reason: HOSPADM

## 2022-06-25 ASSESSMENT — PAIN - FUNCTIONAL ASSESSMENT
PAIN_FUNCTIONAL_ASSESSMENT: 0-10
PAIN_FUNCTIONAL_ASSESSMENT: 0-10

## 2022-06-25 ASSESSMENT — PAIN DESCRIPTION - LOCATION: LOCATION: FLANK

## 2022-06-25 ASSESSMENT — PAIN SCALES - GENERAL
PAINLEVEL_OUTOF10: 6
PAINLEVEL_OUTOF10: 2

## 2022-06-25 ASSESSMENT — PAIN DESCRIPTION - ORIENTATION: ORIENTATION: RIGHT

## 2022-06-25 NOTE — ED PROVIDER NOTES
EMERGENCY DEPARTMENT  -  VISIT NOTE    Date:     6/25/2022  Patient: Alyssia Guadalupe  MRN:    [id number]    Triage:    Chief Complaint   Patient presents with    Abdominal Pain     Ongoing for a week. Seen his PCP, got an Xray done that showed has. Pt been take gas relief meds. However, the pain is getting worse tonight.  Flank Pain     Right flank pain.     ------------------------------ HISTORY OF PRESENT ILLNESS --------------------------    HIPAA - Verbal permision granted from patient to discuss case , including protected health information, in front of family / friends in room at the time of the evaluation. The history is provided by the patient. Alyssia Guadalupe is a 46 y.o. male who presents to the ED for intermittent right-sided abdominal pain for the past week. It has worsened over the last day. There are no specific exacerbating features. No specific alleviating features. The patient has a past medical history of similar abdominal pain and evaluation approximately 4 years ago and an emergency department work-up that included CT abdomen without acute process. The patient notes he had a colonoscopy last year that was insufficiently prepped but did not have any acute findings.     Denies any history of renal stones, dysuria, hematuria, fever, chills, chest pain, shortness of breath.    ----------------------------------- REVIEW OF SYSTEMS -------------------------------------  The following systems were reviewed:  Gastrointestinal -abdominal pain    All other ROS negative except as noted above  --------------------------------------- PAST HISTORY ------------------------------------------  Past Medical History:  Past Medical History:   Diagnosis Date    Anxiety     Asthma     Brain lesion     on MRI 2011, stable on MRI 2016 - thought to be due to sequela of migraine or mild small vessel ischemic disease    Depression     Essential and other specified forms of tremor     Fatty liver disease, nonalcoholic     GERD (gastroesophageal reflux disease)     H/O echocardiogram 01/05/2016    EF 55%. Mild LV hypertrophy. Mild diastolic dysfunction is seen.  Herniated disc     History of echocardiogram 12/12/2018    EF 60%. LV wall thickness is mildly increased. Mild pulmonic regurg.  History of Holter monitoring 09/20/2016    sinus rhythm with sinus tachycardia 27% of the study duration,average HR 90 bpm ranging between 51 - 147 bpm. Rare isolated PAC's and PVC's    History of stress test 01/05/2016    Relatively normal.  EF 63%. Moise Treadmill score is 7, Low risk for CAD    Hx of tilt table evaluation 08/15/2016    Abnormal.  Heart rate,blood pressure response and symptoms were most consistent with dysautonomia.  Hyperlipidemia     Hypertension     Impaired fasting glucose     Kidney stone     Obstructive sleep apnea (adult) (pediatric)     non-compliant with CPAP    Sinus tachycardia      Past Surgical History:  Past Surgical History:   Procedure Laterality Date    BLADDER STONE REMOVAL  1996    CARDIAC CATHETERIZATION Left 05/12/2017    via right radial approach/ Ashley Pisano/ Dr. James Dominique. LMCA: Normal 0%. stenosis. LAD: Mild irregularities 10-20%. LCx: Mild irregularities 20-30%. RCA: Normal 0% stenosis. small caliber vessel. EF 55%.     CERVICAL FUSION  08/27/2020    Dr. Pau Villagomez [facility]     CHOLECYSTECTOMY  08/2011    COLONOSCOPY  2017    [facility]. Shaniqua Ellison in [facility] - negative     COLONOSCOPY  2021    Kenyetta Severino  2007    Dr. Ellen Damon Left 2017    Dr. Cris Hope in [address] [place] [address]  08/2011    Dr. Cris Hope in [place] [address] History:  Social History     Socioeconomic History    Marital status:      Spouse name: Not on file    Number of children: Not on file    Years of education: Not on file    Highest education level: Not on file   Occupational History    Occupation: control    Tobacco Use    Smoking status: Never Smoker    Smokeless tobacco: Never Used   Vaping Use    Vaping Use: Never used   Substance and Sexual Activity    Alcohol use: No    Drug use: No    Sexual activity: Not on file   Other Topics Concern    Not on file   Social History Narrative    Not on file     Social Determinants of Health     Financial Resource Strain: Low Risk     Difficulty of Paying Living Expenses: Not hard at all   Food Insecurity: No Food Insecurity    Worried About [facility] in the Last Year: Never true    [facility] in the Last Year: Never true   Transportation Needs:     Lack of Transportation (Medical): Not on file    Lack of Transportation (Non-Medical): Not on file   Physical Activity:     Days of Exercise per Week: Not on file    Minutes of Exercise per Session: Not on file   Stress:     Feeling of Stress : Not on file   Social Connections:     Frequency of Communication with Friends and Family: Not on file    Frequency of Social Gatherings with Friends and Family: Not on file    Attends Restorationism Services: Not on file    Active Member of [address] [place] [address] or Organizations: Not on file    Attends Club or Organization Meetings: Not on file    Marital Status: Not on file   Intimate Partner Violence:     Fear of Current or Ex-Partner: Not on file    Emotionally Abused: Not on file    Physically Abused: Not on file    Sexually Abused: Not on file   Housing Stability:     Unable to Pay for Housing in the Last Year: Not on file    Number of [place] in the Last Year: Not on file    Unstable Housing in the Last Year: Not on file     Medications: The patient's home medications have been reviewed.   Allergies:   Mobic [meloxicam] and Statins   ------------------------------------- PHYSICAL EXAM -----------------------------------------  /82   Pulse 61   Temp (!) 96.7 °F (35.9 °C) (Tympanic)   Resp 14   Wt 245 lb (111.1 kg)   SpO2 93%   BMI 35.15 kg/m²     Constitutional: Alert, awake  HENT: mucous membranes moist  Eyes: no discharge, nonicteric  Neck:  neck supple, trachea midline  Lungs: no respiratory distress  Heart: RRR , no peripheral edema  Abdomen:   Right upper and lower/flank abdominal tenderness  Soft, ND, no masses, no rebound/guarding, no peritoneal signs   no CVA tenderness bilaterally  Extremities: normal peripheral perfusion  Neuro: Alert and oriented, normal speech, RUST, ambulates  Skin: warm and dry  Psych: cooperative, appropriate thought content and judgement  ------------------------------------------ ED COURSE ---------------------------------------------    ED Medications:  Medications   ketorolac (TORADOL) injection 15 mg (has no administration in time range)            --------------------------------- MEDICAL DECISION MAKING -------------------------------  Vital Signs: Reviewed the patients vital signs. Nursing Notes: Reviewed and utilized the nursing notes. Nursing triage and assessment notes reviewed and incorporated. Nature of the Problem: Reoccurrence of a prior problem    ED Physician Core Measures: None indicated     Old Medical Records: The patient's available past medical records and past encounters were reviewed. Summary of pertinent elements include:  Past Medical History:   Diagnosis Date    Anxiety     Asthma     Brain lesion     on MRI 2011, stable on MRI 2016 - thought to be due to sequela of migraine or mild small vessel ischemic disease    Depression     Essential and other specified forms of tremor     Fatty liver disease, nonalcoholic     GERD (gastroesophageal reflux disease)     H/O echocardiogram 01/05/2016    EF 55%. Mild LV hypertrophy. Mild diastolic dysfunction is seen.  Herniated disc     History of echocardiogram 12/12/2018    EF 60%. LV wall thickness is mildly increased. Mild pulmonic regurg.      History of Holter monitoring 09/20/2016    sinus rhythm with sinus tachycardia 27% of the study duration,average HR 90 bpm ranging between 51 - 147 bpm. Rare isolated PAC's and PVC's    History of stress test 01/05/2016    Relatively normal.  EF 63%. Moise Treadmill score is 7, Low risk for CAD    Hx of tilt table evaluation 08/15/2016    Abnormal.  Heart rate,blood pressure response and symptoms were most consistent with dysautonomia.  Hyperlipidemia     Hypertension     Impaired fasting glucose     Kidney stone     Obstructive sleep apnea (adult) (pediatric)     non-compliant with CPAP    Sinus tachycardia      Labs:  CBC without leukocytosis anemia  BMP normal renal function  LFTs without significant abnormality  Lipase within normal limits  UA without signs of infection    Additional Medical Decision Making:    Evaluation w/ HPI, physical exam, labs and studies as above. HD stable. Airway intact. Presents with recurrent intermittent right-sided abdominal pain. Patient had a similar episode and work-up several years ago with a normal CT. Patient abdominal exam is not concerning for acute surgical process. Patient's labs are reassuring. He was observed in the emergency department for prolonged period of time and noted significant improvement in his symptoms upon reassessment at 6 AM.    The patient was instructed to follow-up with his PCP and/or GI for further evaluation of his intermittent symptoms. This may represent a variant of irritable bowel syndrome. He was educated to avoid foods that seem to trigger his symptoms. Although not representing an exhaustive list of the differential diagnoses considered in this patient, to a reasonable degree of clinical confidence, HPI, PE, and studies not consistent with appendicitis/cholecystis/hepatitis/pancreatitis/sbo/ileus/perf/vovulus and I have low suspicion for these etiologies at this time. ------------------------------ IMPRESSION AND DISPOSITION -----------------------------    IMPRESSION  1.   Right-sided abdominal and flank pain    Medical Screening Exam: The patient has received a medical screening examination and within reasonable clinical confidence an emergency medical condition was identified and has been stabilized    DISPOSITION  Discharge home in stable condition    -------------------------------------- COUNSELING ------------------------------------------------     Counseling: Spoke with the patient and discussed todays findings, in addition to providing specific details for the plan of care and counseling regarding the diagnosis and prognosis. he was given the opportunity to ask questions. Discussed the return indications and importance of follow-up. Advised to follow-up with PCP/GI. Advised to return to the ED for changing/worsening symptoms, new symptoms, complaint specific precautions, and precautions listed on the pts discharge paperwork. Educated on the common potential side effects of medication to be prescribed. Belia Garcia D.O.     Attending Physician  Department of Emergency Medicine        Belia Garcia DO  06/25/22 9375

## 2022-07-18 ENCOUNTER — HOSPITAL ENCOUNTER (OUTPATIENT)
Dept: CT IMAGING | Age: 52
Discharge: HOME | End: 2022-07-20
Payer: COMMERCIAL

## 2022-07-18 DIAGNOSIS — R10.9 ABDOMINAL PAIN, UNSPECIFIED ABDOMINAL LOCATION: ICD-10-CM

## 2022-07-18 DIAGNOSIS — M54.50 LUMBAR PAIN: ICD-10-CM

## 2022-07-18 PROCEDURE — 6360000004 HC RX CONTRAST MEDICATION: Performed by: NURSE PRACTITIONER

## 2022-07-18 PROCEDURE — 74176 CT ABD & PELVIS W/O CONTRAST: CPT

## 2022-07-18 RX ADMIN — IOPAMIDOL 18 ML: 755 INJECTION, SOLUTION INTRAVENOUS at 18:13

## 2022-07-20 ENCOUNTER — OFFICE VISIT (OUTPATIENT)
Dept: CARDIOLOGY | Age: 52
End: 2022-07-20
Payer: COMMERCIAL

## 2022-07-20 VITALS
HEART RATE: 86 BPM | RESPIRATION RATE: 18 BRPM | WEIGHT: 245 LBS | DIASTOLIC BLOOD PRESSURE: 77 MMHG | OXYGEN SATURATION: 96 % | BODY MASS INDEX: 35.07 KG/M2 | HEIGHT: 70 IN | SYSTOLIC BLOOD PRESSURE: 136 MMHG

## 2022-07-20 DIAGNOSIS — I25.10 ASHD (ARTERIOSCLEROTIC HEART DISEASE): Primary | ICD-10-CM

## 2022-07-20 DIAGNOSIS — G47.33 OSA (OBSTRUCTIVE SLEEP APNEA): ICD-10-CM

## 2022-07-20 DIAGNOSIS — E66.9 OBESITY, CLASS II, BMI 35-39.9: ICD-10-CM

## 2022-07-20 DIAGNOSIS — E78.2 MIXED HYPERLIPIDEMIA: ICD-10-CM

## 2022-07-20 DIAGNOSIS — R07.9 CHEST PAIN, UNSPECIFIED TYPE: ICD-10-CM

## 2022-07-20 DIAGNOSIS — Z82.49 FAMILY HISTORY OF ABDOMINAL AORTIC ANEURYSM (AAA): ICD-10-CM

## 2022-07-20 PROCEDURE — 93000 ELECTROCARDIOGRAM COMPLETE: CPT | Performed by: INTERNAL MEDICINE

## 2022-07-20 PROCEDURE — 99213 OFFICE O/P EST LOW 20 MIN: CPT | Performed by: INTERNAL MEDICINE

## 2022-07-20 NOTE — PATIENT INSTRUCTIONS
SURVEY:    You may be receiving a survey from [employer] regarding your visit today. Please complete the survey to enable us to provide the highest quality of care to you and your family. If you cannot score us a very good on any question, please call the office to discuss how we could have made your experience a very good one. Thank you.

## 2022-07-20 NOTE — PROGRESS NOTES
Cuca Lao am scribing for and in the presence of Kortney Dejesus MD, F.A.C.C..      Subjective:     CHIEF COMPLAINT / HPI:   Chief Complaint   Patient presents with    Follow-up     HX:CP, ASHD, HLD, obese, YVES Pt is here for yearly follow up he states he is doing well, he did have some chest discomfort a few months ago arm went numb had neck surgery 8/2020 SOB with humid or in shower,lightheaded if getting up quickly  Denies:CP, palp     Dear Dr Drew Barton is 46 y.o. male with multiple medical problems as outlined below who initially presented for evaluation of chest pain and dizziness. History of negative stress test on 1/6/2016. Patient had CTA of aorta to rule out aortic aneurysm and found to have dense calcification of the left main coronary artery leading to cardiac catheterization which revealed mild CAD without any focal stenosis. No prior history of myocardial infarction or heart failure. History of anxiety/panic attacks. History of sleep apnea, not using CPAP. History of dyslipidemia and statin intolerance. Finally tolerating Livalo 1 mg daily, started in July 2017. Tilt done in 2016, was abnormal, Heart rate,blood pressure response and symptoms were most consistent with dysautonomia. Holter done in 2016, sinus rhythm with sinus tachycardia 27% of the study duration,average HR 90 bpm ranging between 51 - 147 bpm. Rare isolated PAC's and PVC's    ECG done on (11/11/2018)- Normal sinus rhythm with sinus arrhythmia. Normal ECG. When compared with ECG of 06-OCT-2017 16:13, no significant change was found    EKG done in office 5/17/2019 that showed sinus rhythm with no acute ischemic changes. Stress test done 5/17/2019- EF 61% Normal myocardial perfusion imaging without evidence of significant myocardial ischemia or infarction. He went 8 minutes and 20 seconds.     ECG done on 11/11/2020 at [facility] showed normal sinus rhythm and was a normal ECG    Mr. Silvana Bruno is here for a yearly follow up. He reports he did have some chest pain, a few months ago and thinks it is stress related. He was in emergency room on 6/25/2022 for right flank pain. He is fairly active he walks a lot and does a lot at work. No significant chest pain. Denies any blood in urine or stools. Denies stomach pain, nausea or vomiting. He does not use CPAP regularly he tried although it hurt his neck so he stopped using it in March. He is going to talk to Dr. Perez Chandra about that. He also states he has occasional lightheaded/dizziness when he gets up quickly denies any falls or near falls. This has not changed from prior. EKG done today in office 7/20/2022- Normal sinus rhythm. 84 bpm    Past Medical History:    Past Medical History:   Diagnosis Date    Anxiety     Asthma     Brain lesion     on MRI 2011, stable on MRI 2016 - thought to be due to sequela of migraine or mild small vessel ischemic disease    Depression     Essential and other specified forms of tremor     Fatty liver disease, nonalcoholic     GERD (gastroesophageal reflux disease)     H/O echocardiogram 01/05/2016    EF 55%. Mild LV hypertrophy. Mild diastolic dysfunction is seen. Herniated disc     History of echocardiogram 12/12/2018    EF 60%. LV wall thickness is mildly increased. Mild pulmonic regurg. History of Holter monitoring 09/20/2016    sinus rhythm with sinus tachycardia 27% of the study duration,average HR 90 bpm ranging between 51 - 147 bpm. Rare isolated PAC's and PVC's    History of stress test 01/05/2016    Relatively normal.  EF 63%. Moise Treadmill score is 7, Low risk for CAD    Hx of tilt table evaluation 08/15/2016    Abnormal.  Heart rate,blood pressure response and symptoms were most consistent with dysautonomia.     Hyperlipidemia     Hypertension     Impaired fasting glucose     Kidney stone     Obstructive sleep apnea (adult) (pediatric)     non-compliant with CPAP    Sinus tachycardia      Past Surgical History:  Past Surgical History:   Procedure Laterality Date    BLADDER STONE REMOVAL  1996    CARDIAC CATHETERIZATION Left 05/12/2017    via right radial approach/ Ashley Pisano/ Dr. Charissa Carl. LMCA: Normal 0%. stenosis. LAD: Mild irregularities 10-20%. LCx: Mild irregularities 20-30%. RCA: Normal 0% stenosis. small caliber vessel. EF 55%. CERVICAL FUSION  08/27/2020    Dr. Elo Cannon [facility] - [facility]CD[facility]     CHOLECYSTECTOMY  08/2011    COLONOSCOPY  2017    Dr. Hunter Joya in [facility] - negative     COLONOSCOPY  2021    Brain Brewer  2007    Dr. Casa Mccormick Left 2017    Dr. eHavenly Molina in [facility] [place]  08/2011    Dr. Heavenly Molina in [facility] History:    Social History     Tobacco Use   Smoking Status Never   Smokeless Tobacco Never     Current Medications:  Outpatient Medications Marked as Taking for the 7/20/22 encounter (Office Visit) with Azam Patino MD   Medication Sig Dispense Refill    budesonide-formoterol (SYMBICORT) 160-4.5 MCG/ACT AERO INHALE 2 PUFFS BY MOUTH 2 TIMES A DAY. 10.2 g 5    tiZANidine (ZANAFLEX) 2 MG tablet Take 1 tablet by mouth 3 times daily as needed (back pain/muscle spasm) 30 tablet 0    omeprazole (PRILOSEC) 40 MG delayed release capsule TAKE 1 CAPSULE BY MOUTH ONCE DAILY 90 capsule 3    pitavastatin (LIVALO) 1 MG TABS tablet Take 1 tablet by mouth nightly 90 tablet 3    midodrine (PROAMATINE) 10 MG tablet Take 1 tablet by mouth 2 times daily 180 tablet 3    metoprolol succinate (TOPROL XL) 25 MG extended release tablet Take 1 tablet by mouth 2 times daily 180 tablet 3    albuterol sulfate  (90 Base) MCG/ACT inhaler INHALE 1 OR 2 PUFFS BY MOUTH EVERY 4-6 HOURS AS NEEDED FOR SHORTNESS OF BREATH/WHEEZING 18 g 1    aspirin 81 MG chewable tablet Take 81 mg by mouth daily         REVIEW OF SYSTEMS:    CONSTITUTIONAL: No major weight gain or loss, fatigue, weakness, night sweats or fever.   HEENT: No new vision difficulties or ringing in the ears. RESPIRATORY: See HPI  CARDIOVASCULAR: See HPI  GI: No nausea, vomiting, diarrhea, constipation, abdominal pain or changes in bowel habits. : No urinary frequency, urgency, incontinence hematuria or dysuria. SKIN: No cyanosis or skin lesions. MUSCULOSKELETAL: No new muscle or joint pain. NEUROLOGICAL: No syncope or TIA-like symptoms. PSYCHIATRIC: No anxiety, pain, insomnia or depression    Objective:     PHYSICAL EXAM:      VITALS:    /77 (Site: Left Upper Arm, Position: Sitting, Cuff Size: Medium Adult)   Pulse 86   Resp 18   Ht 5' 10\" (1.778 m)   Wt 245 lb (111.1 kg)   SpO2 96%   BMI 35.15 kg/m²   CONSTITUTIONAL: Cooperative, no apparent distress, and appears well nourished / developed. NEUROLOGIC:  Awake and orientated to person, place and time. PSYCH: Calm affect. SKIN: Warm and dry. HEENT: Sclera non-icteric, normocephalic, neck supple, no elevation of JVP, normal carotid pulses with no bruits and thyroid normal size. Right flank pain. LUNGS:  No increased work of breathing and clear to auscultation, no crackles or wheezing. Cardiovascular: Normal rate, regular rhythm, normal heart sounds. Exam reveals no gallop and no friction rubs. No murmur was heard. ABDOMEN:  Normal bowel sounds, non-distended and non-tender to palpation. Extremities: No edema. No cyanosis or clubbing. 2+ radial and carotid pulses. Distal extremity pulses: 2+ bilaterally.     DATA:    Lab Results   Component Value Date    ALT 53 (H) 06/25/2022    AST 30 06/25/2022    ALKPHOS 76 06/25/2022    BILITOT 0.41 06/25/2022     Lab Results   Component Value Date    CREATININE 0.98 06/25/2022    BUN 13 06/25/2022     06/25/2022    K 4.1 06/25/2022     06/25/2022    CO2 22 06/25/2022     Lab Results   Component Value Date    TSH 1.03 07/13/2021    M6PGZXS 10.2 07/16/2012     Lab Results   Component Value Date    WBC 8.4 06/25/2022    HGB 15.5 06/25/2022    HCT 47.7 06/25/2022    MCV 89.7 06/25/2022     06/25/2022       Lab Results   Component Value Date    TRIG 182 (H) 02/01/2022    TRIG 258 (H) 07/31/2021    TRIG 238 (H) 12/22/2020     Lab Results   Component Value Date    HDL 38 (L) 02/01/2022    HDL 38 (L) 07/31/2021    HDL 36 (L) 12/22/2020     Lab Results   Component Value Date    LDLCHOLESTEROL 68 02/01/2022    LDLCHOLESTEROL 54 12/22/2020    LDLCHOLESTEROL 52 10/06/2020       Assessment:      Diagnosis Orders   1. ASHD (arteriosclerotic heart disease)        2. Mixed hyperlipidemia        3. WES (obstructive sleep apnea)        4. Family history of abdominal aortic aneurysm (AAA)        5. Chest pain, unspecified type        6. Obesity, Class II, BMI 35-39.9          Plan:   Atypical Chest Pain: One episode of chest pain since last visit. He think this is caused by anxiety. No further episodes over the past few months. Antiplatelet Agent: Continue aspirin 81 mg daily. I also reminded him to watch for signs of blood in his stool or black tarry stools and stop the medication immediately if this develops as this could be life threatening. Beta Blocker Therapy: Continue metoprolol succinate (Toprol XL)   25 mg twice daily      Statin Therapy: Continue  Livalo       Blood pressures controlled. LDL at goal.  Counseling: I advised Mr. Davida Bradshaw to call our office or go to the emergency room if he develops worsening or persistent chest pain or shortness of breath as this could be life threatening. Atherosclerotic Heart Disease:  Beta Blocker: Continue metoprolol succinate (Toprol XL) 25 mg twice daily. I also discussed the potential side effects of this medication including lightheadedness and dizziness and told him to stop the medication of this occurs and call our office if this occurs. Statin Therapy: Continue  Livalo 1 mg daily      Continue aspirin 81 mg daily.   Counseled patient extensively to come to the emergency room if he has any chest pain or worsening shortness of breath at this can be life-threatening. Hyperlipidemia: Mixed  Statin Therapy: Continue  Livalo 1 mg daily   Last LDL done on 2/1/2022 was 68 mg/dL          Obesity: I also briefly discussed both diet and exercise strategies for him to continue to loses weight and he was very receptive to this. I also printed out some simple lifestyle changes that he can make in his diet to help him lose weight. Obstructive Sleep Apnea:  He is not using CPAP machine. I told him alternatively if he has lost any weight this will help improve his quality of sleep, it will help improve his hemoglobin A1c and blood pressure. 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 The medicine was later stopped by Dr. Ag Lowe. Family history of abdominal aortic aneurysm. Ultrasound of the aorta on 12/22/2020 showed no evidence of abdominal aortic aneurysm. I reassured him about that. Continue current therapy. Finally, I recommended that he continue his other medications and follow up with you as previously scheduled. FOLLOW UP:   I told Mr. Marilu Scott to call my office if he had any problems, but otherwise told him to Return in about 1 year (around 7/20/2023). However, I would be happy to see him sooner should the need arise. Sincerely,  Geovany Shipley MD, MS, F.A.C.C. [facility]) [facility], [address] [place] [address]  Phone: [phone], Fax: [phone]    I believe that the risk of significant morbidity and mortality related to the patient's current medical conditions are: Intermediate. Approximately 25 minutes were spent during prep work, discussion and exam of the patient, and follow up documentation and all of their questions were answered. The documentation recorded by the scribe, accurately and completely reflects the services I personally performed and the decisions made by me.  Geovany Shipley MD, JORJE DE  July 20, 2022

## 2022-08-05 ENCOUNTER — HOSPITAL ENCOUNTER (OUTPATIENT)
Age: 52
Discharge: HOME | End: 2022-08-05
Payer: COMMERCIAL

## 2022-08-05 DIAGNOSIS — R73.01 IMPAIRED FASTING GLUCOSE: ICD-10-CM

## 2022-08-05 DIAGNOSIS — E78.2 MIXED HYPERLIPIDEMIA: ICD-10-CM

## 2022-08-05 DIAGNOSIS — K21.9 GASTROESOPHAGEAL REFLUX DISEASE WITHOUT ESOPHAGITIS: ICD-10-CM

## 2022-08-05 LAB
ALT SERPL-CCNC: 54 U/L (ref 5–41)
ANION GAP SERPL CALCULATED.3IONS-SCNC: 11 MMOL/L (ref 9–17)
AST SERPL-CCNC: 40 U/L
BUN BLDV-MCNC: 10 MG/DL (ref 6–20)
BUN/CREAT BLD: 11 (ref 9–20)
CALCIUM SERPL-MCNC: 9.9 MG/DL (ref 8.6–10.4)
CHLORIDE BLD-SCNC: 102 MMOL/L (ref 98–107)
CO2: 25 MMOL/L (ref 20–31)
CREAT SERPL-MCNC: 0.92 MG/DL (ref 0.7–1.2)
GFR AFRICAN AMERICAN: >60 ML/MIN
GFR NON-AFRICAN AMERICAN: >60 ML/MIN
GFR SERPL CREATININE-BSD FRML MDRD: ABNORMAL ML/MIN/{1.73_M2}
GFR SERPL CREATININE-BSD FRML MDRD: ABNORMAL ML/MIN/{1.73_M2}
GLUCOSE BLD-MCNC: 117 MG/DL (ref 70–99)
HCT VFR BLD CALC: 48.1 % (ref 40.7–50.3)
HEMOGLOBIN: 15.5 G/DL (ref 13–17)
MCH RBC QN AUTO: 28.4 PG (ref 25.2–33.5)
MCHC RBC AUTO-ENTMCNC: 32.2 G/DL (ref 28.4–34.8)
MCV RBC AUTO: 88.1 FL (ref 82.6–102.9)
NRBC AUTOMATED: 0 PER 100 WBC
PDW BLD-RTO: 14 % (ref 11.8–14.4)
PLATELET # BLD: 252 K/UL (ref 138–453)
PMV BLD AUTO: 10.1 FL (ref 8.1–13.5)
POTASSIUM SERPL-SCNC: 4.3 MMOL/L (ref 3.7–5.3)
RBC # BLD: 5.46 M/UL (ref 4.21–5.77)
SODIUM BLD-SCNC: 138 MMOL/L (ref 135–144)
WBC # BLD: 7.8 K/UL (ref 3.5–11.3)

## 2022-08-05 PROCEDURE — 84450 TRANSFERASE (AST) (SGOT): CPT

## 2022-08-05 PROCEDURE — 80061 LIPID PANEL: CPT

## 2022-08-05 PROCEDURE — 36415 COLL VENOUS BLD VENIPUNCTURE: CPT

## 2022-08-05 PROCEDURE — 83036 HEMOGLOBIN GLYCOSYLATED A1C: CPT

## 2022-08-05 PROCEDURE — 85027 COMPLETE CBC AUTOMATED: CPT

## 2022-08-05 PROCEDURE — 84460 ALANINE AMINO (ALT) (SGPT): CPT

## 2022-08-05 PROCEDURE — 80048 BASIC METABOLIC PNL TOTAL CA: CPT

## 2022-08-06 LAB
CHOLESTEROL/HDL RATIO: 4.3
CHOLESTEROL: 137 MG/DL
HDLC SERPL-MCNC: 32 MG/DL
LDL CHOLESTEROL: 44 MG/DL (ref 0–130)
TRIGL SERPL-MCNC: 307 MG/DL

## 2022-08-07 LAB
ESTIMATED AVERAGE GLUCOSE: 137 MG/DL
HBA1C MFR BLD: 6.4 % (ref 4–6)

## 2022-11-21 ENCOUNTER — APPOINTMENT (OUTPATIENT)
Dept: ULTRASOUND IMAGING | Age: 52
End: 2022-11-21
Payer: COMMERCIAL

## 2022-11-21 ENCOUNTER — HOSPITAL ENCOUNTER (EMERGENCY)
Age: 52
Discharge: HOME | End: 2022-11-21
Attending: EMERGENCY MEDICINE
Payer: COMMERCIAL

## 2022-11-21 ENCOUNTER — APPOINTMENT (OUTPATIENT)
Dept: CT IMAGING | Age: 52
End: 2022-11-21
Payer: COMMERCIAL

## 2022-11-21 VITALS
TEMPERATURE: 97.9 F | DIASTOLIC BLOOD PRESSURE: 82 MMHG | RESPIRATION RATE: 18 BRPM | OXYGEN SATURATION: 93 % | SYSTOLIC BLOOD PRESSURE: 115 MMHG | HEART RATE: 79 BPM

## 2022-11-21 DIAGNOSIS — R10.32 GROIN PAIN, LEFT: ICD-10-CM

## 2022-11-21 DIAGNOSIS — R10.32 ABDOMINAL PAIN, LEFT LOWER QUADRANT: Primary | ICD-10-CM

## 2022-11-21 LAB
ABSOLUTE EOS #: 0.34 K/UL (ref 0–0.44)
ABSOLUTE IMMATURE GRANULOCYTE: 0.03 K/UL (ref 0–0.3)
ABSOLUTE LYMPH #: 2.58 K/UL (ref 1.1–3.7)
ABSOLUTE MONO #: 0.64 K/UL (ref 0.1–1.2)
ALBUMIN SERPL-MCNC: 4.9 G/DL (ref 3.5–5.2)
ALBUMIN/GLOBULIN RATIO: 1.8 (ref 1–2.5)
ALP BLD-CCNC: 79 U/L (ref 40–129)
ALT SERPL-CCNC: 58 U/L (ref 5–41)
ANION GAP SERPL CALCULATED.3IONS-SCNC: 11 MMOL/L (ref 9–17)
AST SERPL-CCNC: 42 U/L
BACTERIA: ABNORMAL
BASOPHILS # BLD: 1 % (ref 0–2)
BASOPHILS ABSOLUTE: 0.08 K/UL (ref 0–0.2)
BILIRUB SERPL-MCNC: 0.6 MG/DL (ref 0.3–1.2)
BILIRUBIN URINE: ABNORMAL
BUN BLDV-MCNC: 13 MG/DL (ref 6–20)
BUN/CREAT BLD: 12 (ref 9–20)
CALCIUM SERPL-MCNC: 9.9 MG/DL (ref 8.6–10.4)
CHLORIDE BLD-SCNC: 101 MMOL/L (ref 98–107)
CO2: 26 MMOL/L (ref 20–31)
COLOR: YELLOW
CREAT SERPL-MCNC: 1.05 MG/DL (ref 0.7–1.2)
EOSINOPHILS RELATIVE PERCENT: 5 % (ref 1–4)
EPITHELIAL CELLS UA: ABNORMAL /HPF (ref 0–5)
GFR SERPL CREATININE-BSD FRML MDRD: >60 ML/MIN/1.73M2
GLUCOSE BLD-MCNC: 147 MG/DL (ref 70–99)
GLUCOSE URINE: NEGATIVE
HCT VFR BLD CALC: 51.1 % (ref 40.7–50.3)
HEMOGLOBIN: 17.3 G/DL (ref 13–17)
IMMATURE GRANULOCYTES: 0 %
KETONES, URINE: NEGATIVE
LACTIC ACID: 1.5 MMOL/L (ref 0.5–2.2)
LEUKOCYTE ESTERASE, URINE: NEGATIVE
LIPASE: 54 U/L (ref 13–60)
LYMPHOCYTES # BLD: 35 % (ref 24–43)
MCH RBC QN AUTO: 29.8 PG (ref 25.2–33.5)
MCHC RBC AUTO-ENTMCNC: 33.9 G/DL (ref 28.4–34.8)
MCV RBC AUTO: 88 FL (ref 82.6–102.9)
MONOCYTES # BLD: 9 % (ref 3–12)
MUCUS: ABNORMAL
NITRITE, URINE: NEGATIVE
NRBC AUTOMATED: 0 PER 100 WBC
PDW BLD-RTO: 13.2 % (ref 11.8–14.4)
PH UA: 5.5 (ref 5–9)
PLATELET # BLD: 252 K/UL (ref 138–453)
PMV BLD AUTO: 10 FL (ref 8.1–13.5)
POTASSIUM SERPL-SCNC: 4.3 MMOL/L (ref 3.7–5.3)
PROTEIN UA: ABNORMAL
RBC # BLD: 5.81 M/UL (ref 4.21–5.77)
RBC UA: ABNORMAL /HPF (ref 0–2)
SEG NEUTROPHILS: 50 % (ref 36–65)
SEGMENTED NEUTROPHILS ABSOLUTE COUNT: 3.65 K/UL (ref 1.5–8.1)
SODIUM BLD-SCNC: 138 MMOL/L (ref 135–144)
SPECIFIC GRAVITY UA: >1.03 (ref 1.01–1.02)
TOTAL PROTEIN: 7.7 G/DL (ref 6.4–8.3)
TURBIDITY: CLEAR
URINE HGB: NEGATIVE
UROBILINOGEN, URINE: NORMAL
WBC # BLD: 7.3 K/UL (ref 3.5–11.3)
WBC UA: ABNORMAL /HPF (ref 0–5)

## 2022-11-21 PROCEDURE — 6360000004 HC RX CONTRAST MEDICATION: Performed by: EMERGENCY MEDICINE

## 2022-11-21 PROCEDURE — 81001 URINALYSIS AUTO W/SCOPE: CPT

## 2022-11-21 PROCEDURE — 83690 ASSAY OF LIPASE: CPT

## 2022-11-21 PROCEDURE — 80053 COMPREHEN METABOLIC PANEL: CPT

## 2022-11-21 PROCEDURE — 99285 EMERGENCY DEPT VISIT HI MDM: CPT

## 2022-11-21 PROCEDURE — 93976 VASCULAR STUDY: CPT

## 2022-11-21 PROCEDURE — 83605 ASSAY OF LACTIC ACID: CPT

## 2022-11-21 PROCEDURE — 36415 COLL VENOUS BLD VENIPUNCTURE: CPT

## 2022-11-21 PROCEDURE — 85025 COMPLETE CBC W/AUTO DIFF WBC: CPT

## 2022-11-21 PROCEDURE — 74177 CT ABD & PELVIS W/CONTRAST: CPT

## 2022-11-21 RX ORDER — SODIUM CHLORIDE 0.9 % (FLUSH) 0.9 %
3 SYRINGE (ML) INJECTION EVERY 8 HOURS
Status: DISCONTINUED | OUTPATIENT
Start: 2022-11-21 | End: 2022-11-21 | Stop reason: HOSPADM

## 2022-11-21 RX ADMIN — IOPAMIDOL 75 ML: 755 INJECTION, SOLUTION INTRAVENOUS at 09:30

## 2022-11-21 ASSESSMENT — PAIN - FUNCTIONAL ASSESSMENT
PAIN_FUNCTIONAL_ASSESSMENT: 0-10
PAIN_FUNCTIONAL_ASSESSMENT: 0-10

## 2022-11-21 ASSESSMENT — PAIN DESCRIPTION - LOCATION: LOCATION: SCROTUM

## 2022-11-21 ASSESSMENT — PAIN DESCRIPTION - ORIENTATION: ORIENTATION: LEFT

## 2022-11-21 ASSESSMENT — PAIN SCALES - GENERAL
PAINLEVEL_OUTOF10: 0
PAINLEVEL_OUTOF10: 4

## 2022-11-21 ASSESSMENT — PAIN DESCRIPTION - DESCRIPTORS: DESCRIPTORS: ACHING

## 2022-11-21 ASSESSMENT — LIFESTYLE VARIABLES: HOW OFTEN DO YOU HAVE A DRINK CONTAINING ALCOHOL: NEVER

## 2022-11-21 NOTE — Clinical Note
Jm Romeror was seen and treated in our emergency department on 11/21/2022. He may return to work on 11/23/2022. If you have any questions or concerns, please don't hesitate to call.       Nelly Hector, DO

## 2022-11-21 NOTE — DISCHARGE INSTRUCTIONS
Please follow-up with your primary care doctor in the next 1 to 2 days. Consider following up with urology. If you have any new or worsening symptoms, please return here or go to any other emergency room for reevaluation. Avoid any heavy lifting for the next few days.

## 2022-11-21 NOTE — ED PROVIDER NOTES
[facility]  EMERGENCY DEPARTMENT ENCOUNTER      Pt Name: Jeanette Rangel  MRN: [id number]  [place] 1970  Date of evaluation: 11/21/2022  Provider: Jose Watson DO    CHIEF COMPLAINT       Chief Complaint   Patient presents with    Groin Swelling     Left testicular pain         HISTORY OF PRESENT ILLNESS   (Location/Symptom, Timing/Onset, Context/Setting, Quality, Duration, Modifying Factors, Severity)  Note limiting factors. Jeanette Rangel is a 46 y.o. male who presents to the emergency department      Is a 57-year-old male who is presenting with left-sided groin pain. The patient reports to me that he had a hernia repair on the same side sometime ago. He has been having intermittent discomfort in this area and did go and see his general surgeon and has a repeat visit on December 2. At that time no suggestion of failure of hernia repair was found. The patient reports that he was at work today and he developed the same pain and decided to come to the emergency room. Nursing Notes were reviewed. REVIEW OF SYSTEMS    (2-9 systems for level 4, 10 or more for level 5)     Review of Systems   Constitutional:  Positive for activity change and appetite change. HENT:  Negative for trouble swallowing and voice change. Eyes:  Negative for photophobia and visual disturbance. Respiratory:  Negative for chest tightness and shortness of breath. Cardiovascular:  Negative for chest pain and palpitations. Gastrointestinal:  Positive for abdominal pain. Negative for diarrhea. Genitourinary:  Positive for testicular pain. Negative for penile discharge, penile pain, penile swelling and scrotal swelling. Musculoskeletal:  Negative for back pain and myalgias. Neurological:  Negative for dizziness and light-headedness. Psychiatric/Behavioral:  The patient is nervous/anxious. Except as noted above the remainder of the review of systems was reviewed and negative.        PAST MEDICAL HISTORY     Past Medical History:   Diagnosis Date    Anxiety     Asthma     Brain lesion     on MRI 2011, stable on MRI 2016 - thought to be due to sequela of migraine or mild small vessel ischemic disease    Depression     Essential and other specified forms of tremor     Fatty liver disease, nonalcoholic     GERD (gastroesophageal reflux disease)     H/O echocardiogram 01/05/2016    EF 55%. Mild LV hypertrophy. Mild diastolic dysfunction is seen. Herniated disc     History of echocardiogram 12/12/2018    EF 60%. LV wall thickness is mildly increased. Mild pulmonic regurg. History of Holter monitoring 09/20/2016    sinus rhythm with sinus tachycardia 27% of the study duration,average HR 90 bpm ranging between 51 - 147 bpm. Rare isolated PAC's and PVC's    History of stress test 01/05/2016    Relatively normal.  EF 63%. Moise Treadmill score is 7, Low risk for CAD    Hx of tilt table evaluation 08/15/2016    Abnormal.  Heart rate,blood pressure response and symptoms were most consistent with dysautonomia. Hyperlipidemia     Hypertension     Impaired fasting glucose     Kidney stone     Obstructive sleep apnea (adult) (pediatric)     non-compliant with CPAP    Sinus tachycardia          SURGICAL HISTORY       Past Surgical History:   Procedure Laterality Date    BLADDER STONE REMOVAL  1996    CARDIAC CATHETERIZATION Left 05/12/2017    via right radial approach/ Ashley Pisano/ Dr. Vanesa Fonseca. LMCA: Normal 0%. stenosis. LAD: Mild irregularities 10-20%. LCx: Mild irregularities 20-30%. RCA: Normal 0% stenosis. small caliber vessel. EF 55%.     CERVICAL FUSION  08/27/2020    Dr. Roman [facility] - [facility]     CHOLECYSTECTOMY  08/2011    COLONOSCOPY  2017    Dr. Stalin Ayala in [facility] - negative     COLONOSCOPY  2021    Vinod Madsen  2007    Dr. Omer Shaw Left 2017    Dr. Fernando Contreras in [address] [place] [address] [place]  08/2011    Dr. Fernando Contreras in [facility] CURRENT MEDICATIONS       Discharge Medication List as of 11/21/2022 11:19 AM        CONTINUE these medications which have NOT CHANGED    Details   metoprolol succinate (TOPROL XL) 25 MG extended release tablet TAKE 1 TABLET BY MOUTH 2 TIMES DAILY, Disp-60 tablet, R-5Normal      budesonide-formoterol (SYMBICORT) 160-4.5 MCG/ACT AERO INHALE 2 PUFFS BY MOUTH 2 TIMES A DAY., Disp-10.2 g, R-5Normal      tiZANidine (ZANAFLEX) 2 MG tablet Take 1 tablet by mouth 3 times daily as needed (back pain/muscle spasm), Disp-30 tablet, R-0Normal      omeprazole (PRILOSEC) 40 MG delayed release capsule TAKE 1 CAPSULE BY MOUTH ONCE DAILY, Disp-90 capsule, R-3Normal      pitavastatin (LIVALO) 1 MG TABS tablet Take 1 tablet by mouth nightly, Disp-90 tablet, R-3Normal      midodrine (PROAMATINE) 10 MG tablet Take 1 tablet by mouth 2 times daily, Disp-180 tablet, R-3Normal      albuterol sulfate  (90 Base) MCG/ACT inhaler INHALE 1 OR 2 PUFFS BY MOUTH EVERY 4-6 HOURS AS NEEDED FOR SHORTNESS OF BREATH/WHEEZING, Disp-18 g, R-1Normal      aspirin 81 MG chewable tablet Take 81 mg by mouth dailyHistorical Med             ALLERGIES     Mobic [meloxicam] and Statins    FAMILY HISTORY       Family History   Problem Relation Age of Onset    Depression Mother     Colon Cancer Mother 64    Cancer Mother     COPD Father     Heart Disease Father     Lung Cancer Father 78    Other Father         AAA     Heart Defect Sister         septal defect as a baby    Stroke Maternal Grandmother     Other Paternal Grandmother         tremors    Heart Disease Paternal Grandfather           SOCIAL HISTORY       Social History     Socioeconomic History    Marital status:      Spouse name: None    Number of children: None    Years of education: None    Highest education level: None   Occupational History    Occupation:    Tobacco Use    Smoking status: Never    Smokeless tobacco: Never   Vaping Use    Vaping Use: Never used   Substance and Sexual Activity    Alcohol use: No    Drug use: No     Social Determinants of Health     Financial Resource Strain: Low Risk     Difficulty of Paying Living Expenses: Not hard at all   Food Insecurity: No Food Insecurity    Worried About [address] in the Last Year: Never true    [facility] in the Last Year: Never true       SCREENINGS         [place] Coma Scale  Eye Opening: Spontaneous  Best Verbal Response: Oriented  Best Motor Response: Obeys commands  [place] Coma Scale Score: 15                     CIWA Assessment  BP: 115/82  Heart Rate: 79                 PHYSICAL EXAM    (up to 7 for level 4, 8 or more for level 5)     ED Triage Vitals [11/21/22 0822]   BP Temp Temp src Heart Rate Resp SpO2 Height Weight   -- -- -- 97 20 95 % -- --       Physical Exam  Vitals and nursing note reviewed. Exam conducted with a chaperone present. Constitutional:       Appearance: He is obese. HENT:      Head: Normocephalic. Right Ear: External ear normal.      Left Ear: External ear normal.   Eyes:      Conjunctiva/sclera: Conjunctivae normal.   Cardiovascular:      Rate and Rhythm: Normal rate and regular rhythm. Pulmonary:      Effort: Pulmonary effort is normal.   Abdominal:      Palpations: Abdomen is soft. Tenderness: There is abdominal tenderness. There is no guarding or rebound. Genitourinary:      Musculoskeletal:      Cervical back: Neck supple. Skin:     General: Skin is warm and dry. Capillary Refill: Capillary refill takes less than 2 seconds. Neurological:      General: No focal deficit present. Mental Status: He is alert and oriented to person, place, and time.        DIAGNOSTIC RESULTS     EKG: All EKG's are interpreted by the Emergency Department Physician who either signs or Co-signs this chart in the absence of a cardiologist.        RADIOLOGY:   Non-plain film images such as CT, Ultrasound and MRI are read by the radiologist. Amilcar Ortega radiographic images are visualized and preliminarily interpreted by the emergency physician with the below findings:        Interpretation per the Radiologist below, if available at the time of this note:    [place] [address]   Final Result   Testicles appear unremarkable with flow demonstrated bilaterally. CT ABDOMEN PELVIS W IV CONTRAST Additional Contrast? None   Final Result   1. No acute findings in the abdomen and pelvis to account for the patient's   left lower quadrant pain. ED BEDSIDE ULTRASOUND:   Performed by ED Physician - none    LABS:  Labs Reviewed   CBC WITH AUTO DIFFERENTIAL - Abnormal; Notable for the following components:       Result Value    RBC 5.81 (*)     Hemoglobin 17.3 (*)     Hematocrit 51.1 (*)     Eosinophils % 5 (*)     All other components within normal limits   COMPREHENSIVE METABOLIC PANEL - Abnormal; Notable for the following components:    Glucose 147 (*)     ALT 58 (*)     AST 42 (*)     All other components within normal limits   URINALYSIS WITH REFLEX TO CULTURE - Abnormal; Notable for the following components:    Bilirubin Urine SMALL (*)     Specific Gravity, UA >1.030 (*)     Protein, UA TRACE (*)     All other components within normal limits   MICROSCOPIC URINALYSIS - Abnormal; Notable for the following components:    Bacteria, UA 2+ (*)     Mucus, UA 3+ (*)     All other components within normal limits   LIPASE   LACTIC ACID       All other labs were within normal range or not returned as of this dictation.     EMERGENCY DEPARTMENT COURSE and DIFFERENTIAL DIAGNOSIS/MDM:   Vitals:    Vitals:    11/21/22 0822 11/21/22 1035 11/21/22 1113 11/21/22 1120   BP:  115/82     Pulse: 97 79     Resp: 20 20  18   Temp:   97.9 °F (36.6 °C)    SpO2: 95% 93%             MDM     Amount and/or Complexity of Data Reviewed  Decide to obtain previous medical records or to obtain history from someone other than the patient: yes    The work-up here is not suggestive of acute process, surgical nor urologic. I will refer the patient to urology, he has very close follow-up with general surgery coming up and I have provided him with strict return instructions. REASSESSMENT          CRITICAL CARE TIME       CONSULTS:  None    PROCEDURES:  Unless otherwise noted below, none     Procedures        FINAL IMPRESSION      1. Abdominal pain, left lower quadrant    2. Groin pain, left          DISPOSITION/PLAN   DISPOSITION Decision To Discharge 11/21/2022 11:14:23 AM      PATIENT REFERRED TO:  Sophia Ferreira MD  [address] [address]  [phone]    Schedule an appointment as soon as possible for a visit       [facility]  [address]  [phone]    If symptoms worsen    DISCHARGE MEDICATIONS:  Discharge Medication List as of 11/21/2022 11:19 AM        Controlled Substances Monitoring:     RX Monitoring 1/17/2020   Periodic Controlled Substance Monitoring No signs of potential drug abuse or diversion identified.        (Please note that portions of this note were completed with a voice recognition program.  Efforts were made to edit the dictations but occasionally words are mis-transcribed.)    Lula Spatz, DO (electronically signed)  Attending Emergency Physician            Lula Spatz, DO  11/22/22 0661

## 2022-11-22 ASSESSMENT — ENCOUNTER SYMPTOMS
PHOTOPHOBIA: 0
DIARRHEA: 0
ABDOMINAL PAIN: 1
VOICE CHANGE: 0
TROUBLE SWALLOWING: 0
CHEST TIGHTNESS: 0
SHORTNESS OF BREATH: 0
BACK PAIN: 0

## 2022-12-01 ENCOUNTER — OFFICE VISIT (OUTPATIENT)
Dept: UROLOGY | Age: 52
End: 2022-12-01

## 2022-12-01 ENCOUNTER — HOSPITAL ENCOUNTER (OUTPATIENT)
Age: 52
Setting detail: SPECIMEN
Discharge: HOME | End: 2022-12-01
Payer: COMMERCIAL

## 2022-12-01 VITALS
DIASTOLIC BLOOD PRESSURE: 83 MMHG | HEART RATE: 95 BPM | WEIGHT: 257 LBS | BODY MASS INDEX: 36.79 KG/M2 | SYSTOLIC BLOOD PRESSURE: 126 MMHG | TEMPERATURE: 97.7 F | HEIGHT: 70 IN

## 2022-12-01 DIAGNOSIS — N45.1 EPIDIDYMITIS: ICD-10-CM

## 2022-12-01 DIAGNOSIS — N45.1 EPIDIDYMITIS: Primary | ICD-10-CM

## 2022-12-01 LAB
BACTERIA: ABNORMAL
BILIRUBIN URINE: NEGATIVE
COLOR: YELLOW
EPITHELIAL CELLS UA: ABNORMAL /HPF (ref 0–5)
GLUCOSE URINE: NEGATIVE
KETONES, URINE: NEGATIVE
LEUKOCYTE ESTERASE, URINE: NEGATIVE
NITRITE, URINE: NEGATIVE
PH UA: 5.5 (ref 5–9)
PROTEIN UA: NEGATIVE
RBC UA: ABNORMAL /HPF (ref 0–2)
SPECIFIC GRAVITY UA: >1.03 (ref 1.01–1.02)
TURBIDITY: CLEAR
URINE HGB: NEGATIVE
UROBILINOGEN, URINE: NORMAL
WBC UA: ABNORMAL /HPF (ref 0–5)

## 2022-12-01 PROCEDURE — 87086 URINE CULTURE/COLONY COUNT: CPT

## 2022-12-01 PROCEDURE — 81001 URINALYSIS AUTO W/SCOPE: CPT

## 2022-12-01 RX ORDER — LEVOFLOXACIN 500 MG/1
500 TABLET, FILM COATED ORAL DAILY
Qty: 10 TABLET | Refills: 0 | Status: SHIPPED | OUTPATIENT
Start: 2022-12-01 | End: 2022-12-11

## 2022-12-01 NOTE — PROGRESS NOTES
Bladderscan performed in office today:  Pt voided  prior to the visit, about 1 hour ago,   Was able to void 60 ml  PVR - 0 mL

## 2022-12-01 NOTE — PROGRESS NOTES
HPI:          Patient is a 46 y.o. male in no acute distress. He is alert and oriented to person, place, and time. Patient is here today as a new patient. Patient was recently seen in the emergency department. Patient was referred to us by Dr. Shari Bojorquez. Patient did have acute onset of left orchialgia. Patient did go to the emergency department. Patient does state that at the time he did reach emergency for most of his pain had resolved. Patient did get a scrotal ultrasound. The film was independently reviewed. This does show good blood flow to both testicles as well as no masses. Patient does have a history of left-sided inguinal hernia repair. This was done approximately 6 years ago. Patient has been having intermittent left orchialgia since the procedure. No current gross hematuria or dysuria. Patient has seen urology in the distant past.  This is for a kidney stone. He has had no formal follow-up. Patient did have recent CT scan. This film was independently reviewed. This does not show any significant  calcifications. Patient's pain is moderate today. Past Medical History:   Diagnosis Date    Anxiety     Asthma     Brain lesion     on MRI 2011, stable on MRI 2016 - thought to be due to sequela of migraine or mild small vessel ischemic disease    Depression     Essential and other specified forms of tremor     Fatty liver disease, nonalcoholic     GERD (gastroesophageal reflux disease)     H/O echocardiogram 01/05/2016    EF 55%. Mild LV hypertrophy. Mild diastolic dysfunction is seen. Herniated disc     History of echocardiogram 12/12/2018    EF 60%. LV wall thickness is mildly increased. Mild pulmonic regurg. History of Holter monitoring 09/20/2016    sinus rhythm with sinus tachycardia 27% of the study duration,average HR 90 bpm ranging between 51 - 147 bpm. Rare isolated PAC's and PVC's    History of stress test 01/05/2016    Relatively normal.  EF 63%.  Moise Treadmill score is 7, Low risk for CAD    Hx of tilt table evaluation 08/15/2016    Abnormal.  Heart rate,blood pressure response and symptoms were most consistent with dysautonomia. Hyperlipidemia     Hypertension     Impaired fasting glucose     Kidney stone     Obstructive sleep apnea (adult) (pediatric)     non-compliant with CPAP    Sinus tachycardia      Past Surgical History:   Procedure Laterality Date    BLADDER STONE REMOVAL  1996    CARDIAC CATHETERIZATION Left 05/12/2017    via right radial approach/ Ashley Pisano/ Dr. Miriam Reddy. LMCA: Normal 0%. stenosis. LAD: Mild irregularities 10-20%. LCx: Mild irregularities 20-30%. RCA: Normal 0% stenosis. small caliber vessel. EF 55%. CERVICAL FUSION  08/27/2020    Dr. Judah Alvarez [facility] - [facility]     CHOLECYSTECTOMY  08/2011    COLONOSCOPY  2017    Dr. Dalia Fulton in [facility] - negative     COLONOSCOPY  2021    Osorio Rice  2007    Dr. Roni Marshall Left 2017    Dr. Catracho Valadez in [address] [place] [address] Médicis  08/2011    Dr. Catracho Valadez in [facility]     Outpatient Encounter Medications as of 12/1/2022   Medication Sig Dispense Refill    metoprolol succinate (TOPROL XL) 25 MG extended release tablet TAKE 1 TABLET BY MOUTH 2 TIMES DAILY 60 tablet 5    budesonide-formoterol (SYMBICORT) 160-4.5 MCG/ACT AERO INHALE 2 PUFFS BY MOUTH 2 TIMES A DAY.  10.2 g 5    tiZANidine (ZANAFLEX) 2 MG tablet Take 1 tablet by mouth 3 times daily as needed (back pain/muscle spasm) 30 tablet 0    omeprazole (PRILOSEC) 40 MG delayed release capsule TAKE 1 CAPSULE BY MOUTH ONCE DAILY 90 capsule 3    pitavastatin (LIVALO) 1 MG TABS tablet Take 1 tablet by mouth nightly 90 tablet 3    midodrine (PROAMATINE) 10 MG tablet Take 1 tablet by mouth 2 times daily 180 tablet 3    albuterol sulfate  (90 Base) MCG/ACT inhaler INHALE 1 OR 2 PUFFS BY MOUTH EVERY 4-6 HOURS AS NEEDED FOR SHORTNESS OF BREATH/WHEEZING 18 g 1    aspirin 81 MG chewable tablet Take 81 mg by mouth daily       No facility-administered encounter medications on file as of 12/1/2022. Current Outpatient Medications on File Prior to Visit   Medication Sig Dispense Refill    metoprolol succinate (TOPROL XL) 25 MG extended release tablet TAKE 1 TABLET BY MOUTH 2 TIMES DAILY 60 tablet 5    budesonide-formoterol (SYMBICORT) 160-4.5 MCG/ACT AERO INHALE 2 PUFFS BY MOUTH 2 TIMES A DAY. 10.2 g 5    tiZANidine (ZANAFLEX) 2 MG tablet Take 1 tablet by mouth 3 times daily as needed (back pain/muscle spasm) 30 tablet 0    omeprazole (PRILOSEC) 40 MG delayed release capsule TAKE 1 CAPSULE BY MOUTH ONCE DAILY 90 capsule 3    pitavastatin (LIVALO) 1 MG TABS tablet Take 1 tablet by mouth nightly 90 tablet 3    midodrine (PROAMATINE) 10 MG tablet Take 1 tablet by mouth 2 times daily 180 tablet 3    albuterol sulfate  (90 Base) MCG/ACT inhaler INHALE 1 OR 2 PUFFS BY MOUTH EVERY 4-6 HOURS AS NEEDED FOR SHORTNESS OF BREATH/WHEEZING 18 g 1    aspirin 81 MG chewable tablet Take 81 mg by mouth daily       No current facility-administered medications on file prior to visit. Mobic [meloxicam] and Statins  Family History   Problem Relation Age of Onset    Depression Mother     Colon Cancer Mother 64    Cancer Mother     COPD Father     Heart Disease Father     Lung Cancer Father 78    Other Father         AAA     Heart Defect Sister         septal defect as a baby    Stroke Maternal Grandmother     Other Paternal Grandmother         tremors    Heart Disease Paternal Grandfather      Social History     Tobacco Use   Smoking Status Never   Smokeless Tobacco Never       Social History     Substance and Sexual Activity   Alcohol Use No       Review of Systems    There were no vitals taken for this visit. PHYSICAL EXAM:  Constitutional: Patient in no acute distress; Neuro: alert and oriented to person place and time.     Psych: Mood and affect normal.  Skin: Normal  Lungs: Respiratory effort normal  Cardiovascular:  Normal peripheral pulses  Abdomen: Soft, non-tender, non-distended with no CVA, flank pain  Bladder non-tender and not distended. Lymphatics: no palpable lymphadenopathy  Penis normal  Urethral meatus normal  Scrotal exam normal  Testicles normal bilaterally  Epididymis normal bilaterally  No evidence of inguinal hernia      Lab Results   Component Value Date    BUN 13 11/21/2022     Lab Results   Component Value Date    CREATININE 1.05 11/21/2022     Lab Results   Component Value Date    PSA 0.82 07/22/2022    PSA 0.63 02/01/2022    PSA 0.64 07/13/2021       ASSESSMENT:  This is a 46 y.o. male with the following diagnoses:   Diagnosis Orders   1. Epididymitis  [place] MEASUREMENT,POST-VOID RESIDUAL VOLUME BY US,NON-IMAGING    levoFLOXacin (LEVAQUIN) 500 MG tablet           PLAN:  Patient will take a course of Levaquin. We also told him to use a course of ibuprofen. He will follow-up with us in 3 to 4 weeks. He is follow-up with his general surgeon tomorrow.

## 2022-12-01 NOTE — PATIENT INSTRUCTIONS
SURVEY:    You may be receiving a survey from [employer] regarding your visit today. Please complete the survey to enable us to provide the highest quality of care to you and your family. If you cannot score us a very good on any question, please call the office to discuss how we could have made your experience a very good one. Thank you.

## 2022-12-02 LAB
CULTURE: NO GROWTH
SPECIMEN DESCRIPTION: NORMAL

## 2022-12-05 ENCOUNTER — TELEPHONE (OUTPATIENT)
Dept: UROLOGY | Age: 52
End: 2022-12-05

## 2022-12-05 NOTE — TELEPHONE ENCOUNTER
----- Message from SANJU Hurd - CNP sent at 12/5/2022  8:55 AM EST -----  Call pt - urine cx reviewed and negative for UTI & for significant microhematuria

## 2022-12-19 RX ORDER — PITAVASTATIN CALCIUM 1.04 MG/1
1 TABLET, FILM COATED ORAL NIGHTLY
Qty: 180 TABLET | Refills: 3 | Status: SHIPPED | OUTPATIENT
Start: 2022-12-19 | End: 2022-12-20 | Stop reason: SDUPTHER

## 2022-12-19 RX ORDER — MIDODRINE HYDROCHLORIDE 10 MG/1
10 TABLET ORAL 2 TIMES DAILY
Qty: 180 TABLET | Refills: 3 | Status: SHIPPED | OUTPATIENT
Start: 2022-12-19 | End: 2022-12-20 | Stop reason: SDUPTHER

## 2022-12-20 RX ORDER — PITAVASTATIN CALCIUM 1.04 MG/1
1 TABLET, FILM COATED ORAL NIGHTLY
Qty: 180 TABLET | Refills: 3 | Status: SHIPPED | OUTPATIENT
Start: 2022-12-20

## 2022-12-20 RX ORDER — MIDODRINE HYDROCHLORIDE 10 MG/1
10 TABLET ORAL 2 TIMES DAILY
Qty: 180 TABLET | Refills: 3 | Status: SHIPPED | OUTPATIENT
Start: 2022-12-20

## 2022-12-27 ENCOUNTER — OFFICE VISIT (OUTPATIENT)
Dept: UROLOGY | Age: 52
End: 2022-12-27
Payer: COMMERCIAL

## 2022-12-27 VITALS
HEIGHT: 70 IN | SYSTOLIC BLOOD PRESSURE: 118 MMHG | WEIGHT: 256 LBS | BODY MASS INDEX: 36.65 KG/M2 | HEART RATE: 91 BPM | DIASTOLIC BLOOD PRESSURE: 78 MMHG

## 2022-12-27 DIAGNOSIS — N45.1 EPIDIDYMITIS: Primary | ICD-10-CM

## 2022-12-27 PROCEDURE — 99214 OFFICE O/P EST MOD 30 MIN: CPT | Performed by: PHYSICIAN ASSISTANT

## 2022-12-27 RX ORDER — DOXYCYCLINE HYCLATE 100 MG
100 TABLET ORAL 2 TIMES DAILY
Qty: 22 TABLET | Refills: 0 | Status: SHIPPED | OUTPATIENT
Start: 2023-01-02 | End: 2023-01-13

## 2022-12-27 ASSESSMENT — ENCOUNTER SYMPTOMS
COLOR CHANGE: 0
CONSTIPATION: 0
WHEEZING: 0
EYE REDNESS: 0
BACK PAIN: 0
NAUSEA: 0
ABDOMINAL PAIN: 0
VOMITING: 0
COUGH: 0
SHORTNESS OF BREATH: 0

## 2022-12-27 NOTE — PROGRESS NOTES
HPI:      Patient is a 45 y.o. male in no acute distress. He is alert and oriented to person, place, and time. 12/1/2022 Patient is here today as a new patient. Patient was recently seen in the emergency department. Patient was referred to us by Dr. Celso Rosario. Patient did have acute onset of left orchialgia. Patient did go to the emergency department. Patient does state that at the time he did reach emergency for most of his pain had resolved. Patient did get a scrotal ultrasound. The film was independently reviewed. This does show good blood flow to both testicles as well as no masses. Patient does have a history of left-sided inguinal hernia repair. This was done approximately 6 years ago. Patient has been having intermittent left orchialgia since the procedure. No current gross hematuria or dysuria. Patient has seen urology in the distant past.  This is for a kidney stone. He has had no formal follow-up. Patient did have recent CT scan. This film was independently reviewed. This does not show any significant  calcifications. Patient's pain is moderate today. Today:  Patient is here today for follow up left epididymitis. Patient does state that he felt better after a course of Levaquin. He states that he has had pretty significant improvement in his pain. He states that it is not all the time. He does have daily bowel movements. He does drink 20 ounces of Pepsi a day. He is currently on a 10-day course of doxycycline which started on 12/22/2022 for ear infection. Past Medical History:   Diagnosis Date    Anxiety     Asthma     Brain lesion     on MRI 2011, stable on MRI 2016 - thought to be due to sequela of migraine or mild small vessel ischemic disease    Depression     Essential and other specified forms of tremor     Fatty liver disease, nonalcoholic     GERD (gastroesophageal reflux disease)     H/O echocardiogram 01/05/2016    EF 55%. Mild LV hypertrophy.  Mild diastolic dysfunction is seen. Herniated disc     History of echocardiogram 12/12/2018    EF 60%. LV wall thickness is mildly increased. Mild pulmonic regurg. History of Holter monitoring 09/20/2016    sinus rhythm with sinus tachycardia 27% of the study duration,average HR 90 bpm ranging between 51 - 147 bpm. Rare isolated PAC's and PVC's    History of stress test 01/05/2016    Relatively normal.  EF 63%. Moise Treadmill score is 7, Low risk for CAD    Hx of tilt table evaluation 08/15/2016    Abnormal.  Heart rate,blood pressure response and symptoms were most consistent with dysautonomia. Hyperlipidemia     Hypertension     Impaired fasting glucose     Kidney stone     Obstructive sleep apnea (adult) (pediatric)     non-compliant with CPAP    Sinus tachycardia      Past Surgical History:   Procedure Laterality Date    BLADDER STONE REMOVAL  1996    CARDIAC CATHETERIZATION Left 05/12/2017    via right radial approach/ Ashley Pisano/ Dr. Cooper Jay. LMCA: Normal 0%. stenosis. LAD: Mild irregularities 10-20%. LCx: Mild irregularities 20-30%. RCA: Normal 0% stenosis. small caliber vessel. EF 55%. CERVICAL FUSION  08/27/2020    [facility]     CHOLECYSTECTOMY  08/2011    COLONOSCOPY  2017    Dr. Trinh Boland in [facility] - negative     COLONOSCOPY  2021    [address]  2007    Dr. Willis Screen Left 2017    Dr. Ngoc Gutierrez in [address] [place] [address]  08/2011    Dr. Ngoc Gutierrez in [facility]     Outpatient Encounter Medications as of 12/27/2022   Medication Sig Dispense Refill    [START ON 1/2/2023] doxycycline hyclate (VIBRA-TABS) 100 MG tablet Take 1 tablet by mouth 2 times daily for 11 days 22 tablet 0    midodrine (PROAMATINE) 10 MG tablet Take 1 tablet by mouth in the morning and 1 tablet in the evening.  180 tablet 3    pitavastatin (LIVALO) 1 MG TABS tablet Take 1 tablet by mouth nightly 180 tablet 3    metoprolol succinate (TOPROL XL) 25 MG extended release tablet TAKE 1 TABLET BY MOUTH 2 TIMES DAILY 60 tablet 5    budesonide-formoterol (SYMBICORT) 160-4.5 MCG/ACT AERO INHALE 2 PUFFS BY MOUTH 2 TIMES A DAY. 10.2 g 5    omeprazole (PRILOSEC) 40 MG delayed release capsule TAKE 1 CAPSULE BY MOUTH ONCE DAILY 90 capsule 3    albuterol sulfate  (90 Base) MCG/ACT inhaler INHALE 1 OR 2 PUFFS BY MOUTH EVERY 4-6 HOURS AS NEEDED FOR SHORTNESS OF BREATH/WHEEZING 18 g 1    aspirin 81 MG chewable tablet Take 81 mg by mouth daily      [DISCONTINUED] doxycycline hyclate (VIBRA-TABS) 100 MG tablet Take 1 tablet by mouth 2 times daily for 10 days 20 tablet 0     No facility-administered encounter medications on file as of 12/27/2022. Current Outpatient Medications on File Prior to Visit   Medication Sig Dispense Refill    midodrine (PROAMATINE) 10 MG tablet Take 1 tablet by mouth in the morning and 1 tablet in the evening. 180 tablet 3    pitavastatin (LIVALO) 1 MG TABS tablet Take 1 tablet by mouth nightly 180 tablet 3    metoprolol succinate (TOPROL XL) 25 MG extended release tablet TAKE 1 TABLET BY MOUTH 2 TIMES DAILY 60 tablet 5    budesonide-formoterol (SYMBICORT) 160-4.5 MCG/ACT AERO INHALE 2 PUFFS BY MOUTH 2 TIMES A DAY. 10.2 g 5    omeprazole (PRILOSEC) 40 MG delayed release capsule TAKE 1 CAPSULE BY MOUTH ONCE DAILY 90 capsule 3    albuterol sulfate  (90 Base) MCG/ACT inhaler INHALE 1 OR 2 PUFFS BY MOUTH EVERY 4-6 HOURS AS NEEDED FOR SHORTNESS OF BREATH/WHEEZING 18 g 1    aspirin 81 MG chewable tablet Take 81 mg by mouth daily       No current facility-administered medications on file prior to visit.      Mobic [meloxicam] and Statins  Family History   Problem Relation Age of Onset    Depression Mother     Colon Cancer Mother 64    Cancer Mother     COPD Father     Heart Disease Father     Lung Cancer Father 78    Other Father         AAA     Heart Defect Sister         septal defect as a baby    Stroke Maternal Grandmother     Other Paternal Grandmother         tremors    Heart Disease Paternal Grandfather      Social History     Tobacco Use   Smoking Status Never   Smokeless Tobacco Never       Social History     Substance and Sexual Activity   Alcohol Use No       Review of Systems   Constitutional:  Negative for appetite change, chills and fever. Eyes:  Negative for redness and visual disturbance. Respiratory:  Negative for cough, shortness of breath and wheezing. Cardiovascular:  Negative for chest pain and leg swelling. Gastrointestinal:  Negative for abdominal pain, constipation, nausea and vomiting. Genitourinary:  Positive for testicular pain. Negative for decreased urine volume, difficulty urinating, dysuria, enuresis, flank pain, frequency, hematuria, penile discharge, penile pain, scrotal swelling and urgency. Musculoskeletal:  Negative for back pain, joint swelling and myalgias. Skin:  Negative for color change, rash and wound. Neurological:  Negative for dizziness, tremors and numbness. Hematological:  Negative for adenopathy. Does not bruise/bleed easily. /78   Pulse 91   Ht 5' 10\" (1.778 m)   Wt 256 lb (116.1 kg)   BMI 36.73 kg/m²       PHYSICAL EXAM:  Constitutional: Patient in no acute distress; Neuro: alert and oriented to person place and time. Psych: Mood and affect normal.  Lungs: Respiratory effort normal  Abdomen: Soft, non-tender, non-distended  Rectal: deferred       Lab Results   Component Value Date    BUN 13 11/21/2022     Lab Results   Component Value Date    CREATININE 1.05 11/21/2022     Lab Results   Component Value Date    PSA 0.82 07/22/2022    PSA 0.63 02/01/2022    PSA 0.64 07/13/2021       ASSESSMENT:   Diagnosis Orders   1. Epididymitis          PLAN:  We will add an additional 11 days to his current dose of doxycycline for a total of 3 weeks which will be beneficial to his epididymitis.     Recommended tight fitting underwear or jockstrap    Reduce or eliminate caffeine    Follow-up in 4 to 6 weeks

## 2023-02-02 ENCOUNTER — OFFICE VISIT (OUTPATIENT)
Dept: UROLOGY | Age: 53
End: 2023-02-02
Payer: COMMERCIAL

## 2023-02-02 VITALS
WEIGHT: 255 LBS | HEART RATE: 95 BPM | DIASTOLIC BLOOD PRESSURE: 83 MMHG | SYSTOLIC BLOOD PRESSURE: 131 MMHG | BODY MASS INDEX: 36.59 KG/M2

## 2023-02-02 DIAGNOSIS — N45.1 EPIDIDYMITIS: Primary | ICD-10-CM

## 2023-02-02 DIAGNOSIS — N13.8 BPH WITH OBSTRUCTION/LOWER URINARY TRACT SYMPTOMS: ICD-10-CM

## 2023-02-02 DIAGNOSIS — N39.43 POST-VOID DRIBBLING: ICD-10-CM

## 2023-02-02 DIAGNOSIS — R35.1 NOCTURIA: ICD-10-CM

## 2023-02-02 DIAGNOSIS — N41.0 ACUTE PROSTATITIS: ICD-10-CM

## 2023-02-02 DIAGNOSIS — N40.1 BPH WITH OBSTRUCTION/LOWER URINARY TRACT SYMPTOMS: ICD-10-CM

## 2023-02-02 PROCEDURE — 51798 US URINE CAPACITY MEASURE: CPT | Performed by: NURSE PRACTITIONER

## 2023-02-02 PROCEDURE — 99214 OFFICE O/P EST MOD 30 MIN: CPT | Performed by: NURSE PRACTITIONER

## 2023-02-02 RX ORDER — LEVOFLOXACIN 500 MG/1
500 TABLET, FILM COATED ORAL DAILY
Qty: 10 TABLET | Refills: 0 | Status: SHIPPED | OUTPATIENT
Start: 2023-02-02 | End: 2023-02-12

## 2023-02-02 RX ORDER — TAMSULOSIN HYDROCHLORIDE 0.4 MG/1
0.4 CAPSULE ORAL DAILY
Qty: 30 CAPSULE | Refills: 1 | Status: SHIPPED | OUTPATIENT
Start: 2023-02-02

## 2023-02-02 ASSESSMENT — ENCOUNTER SYMPTOMS
NAUSEA: 0
CONSTIPATION: 0
ABDOMINAL PAIN: 0
BACK PAIN: 0
WHEEZING: 0
COUGH: 0
COLOR CHANGE: 0
SHORTNESS OF BREATH: 0
EYE REDNESS: 0
VOMITING: 0

## 2023-02-02 NOTE — PROGRESS NOTES
HPI:          Patient is a 45 y.o. male in no acute distress. He is alert and oriented to person, place, and time. History  12/2022 Referral for left testicular pain. CT and scrotal US negative for  abnormalities   Doxycycline x3 weeks for epididymitis    Today  Here today to follow-up for epididymitis. He did complete a 3-week course of doxycycline. He continues to have intermittent testicular pain, but he does feel that this is more in his suprapubic area. He denies any dysuria or gross hematuria. He does have a daily bowel movement. He does have nocturia 2-4 times per night. He does have postvoid dribbling. He denies daytime frequency. PVR is low. He consumes 1 can of root beer per day. Past Medical History:   Diagnosis Date    Anxiety     Asthma     Brain lesion     on MRI 2011, stable on MRI 2016 - thought to be due to sequela of migraine or mild small vessel ischemic disease    Depression     Essential and other specified forms of tremor     Fatty liver disease, nonalcoholic     GERD (gastroesophageal reflux disease)     H/O echocardiogram 01/05/2016    EF 55%. Mild LV hypertrophy. Mild diastolic dysfunction is seen. Herniated disc     History of echocardiogram 12/12/2018    EF 60%. LV wall thickness is mildly increased. Mild pulmonic regurg. History of Holter monitoring 09/20/2016    sinus rhythm with sinus tachycardia 27% of the study duration,average HR 90 bpm ranging between 51 - 147 bpm. Rare isolated PAC's and PVC's    History of stress test 01/05/2016    Relatively normal.  EF 63%. Moise Treadmill score is 7, Low risk for CAD    Hx of tilt table evaluation 08/15/2016    Abnormal.  Heart rate,blood pressure response and symptoms were most consistent with dysautonomia.     Hyperlipidemia     Hypertension     Impaired fasting glucose     Kidney stone     Obstructive sleep apnea (adult) (pediatric)     non-compliant with CPAP    Sinus tachycardia      Past Surgical History: Procedure Laterality Date    BLADDER STONE REMOVAL  1996    CARDIAC CATHETERIZATION Left 05/12/2017    via right radial approach/ Ashley Pisano/ Dr. Kent Client. LMCA: Normal 0%. stenosis. LAD: Mild irregularities 10-20%. LCx: Mild irregularities 20-30%. RCA: Normal 0% stenosis. small caliber vessel. EF 55%. CERVICAL FUSION  08/27/2020    Dr. Joyce Guo [facility] - [facility]     CHOLECYSTECTOMY  08/2011    COLONOSCOPY  2017    Dr. Jose C Lee in [facility] - negative     COLONOSCOPY  2021    Brian Adamson  2007    Dr. Tori Bullard Left 2017    Dr. Mendel Cory in [address] [place] [address] [place]icis  08/2011    Dr. Mendel Cory in [facility]     Outpatient Encounter Medications as of 2/2/2023   Medication Sig Dispense Refill    tamsulosin (FLOMAX) 0.4 MG capsule Take 1 capsule by mouth daily 30 capsule 1    levoFLOXacin (LEVAQUIN) 500 MG tablet Take 1 tablet by mouth daily for 10 days 10 tablet 0    midodrine (PROAMATINE) 10 MG tablet Take 1 tablet by mouth in the morning and 1 tablet in the evening. 180 tablet 3    pitavastatin (LIVALO) 1 MG TABS tablet Take 1 tablet by mouth nightly 180 tablet 3    metoprolol succinate (TOPROL XL) 25 MG extended release tablet TAKE 1 TABLET BY MOUTH 2 TIMES DAILY 60 tablet 5    budesonide-formoterol (SYMBICORT) 160-4.5 MCG/ACT AERO INHALE 2 PUFFS BY MOUTH 2 TIMES A DAY. 10.2 g 5    omeprazole (PRILOSEC) 40 MG delayed release capsule TAKE 1 CAPSULE BY MOUTH ONCE DAILY 90 capsule 3    albuterol sulfate  (90 Base) MCG/ACT inhaler INHALE 1 OR 2 PUFFS BY MOUTH EVERY 4-6 HOURS AS NEEDED FOR SHORTNESS OF BREATH/WHEEZING 18 g 1    aspirin 81 MG chewable tablet Take 81 mg by mouth daily       No facility-administered encounter medications on file as of 2/2/2023.       Current Outpatient Medications on File Prior to Visit   Medication Sig Dispense Refill    midodrine (PROAMATINE) 10 MG tablet Take 1 tablet by mouth in the morning and 1 tablet in the evening. 180 tablet 3    pitavastatin (LIVALO) 1 MG TABS tablet Take 1 tablet by mouth nightly 180 tablet 3    metoprolol succinate (TOPROL XL) 25 MG extended release tablet TAKE 1 TABLET BY MOUTH 2 TIMES DAILY 60 tablet 5    budesonide-formoterol (SYMBICORT) 160-4.5 MCG/ACT AERO INHALE 2 PUFFS BY MOUTH 2 TIMES A DAY. 10.2 g 5    omeprazole (PRILOSEC) 40 MG delayed release capsule TAKE 1 CAPSULE BY MOUTH ONCE DAILY 90 capsule 3    albuterol sulfate  (90 Base) MCG/ACT inhaler INHALE 1 OR 2 PUFFS BY MOUTH EVERY 4-6 HOURS AS NEEDED FOR SHORTNESS OF BREATH/WHEEZING 18 g 1    aspirin 81 MG chewable tablet Take 81 mg by mouth daily       No current facility-administered medications on file prior to visit. Mobic [meloxicam] and Statins  Family History   Problem Relation Age of Onset    Depression Mother     Colon Cancer Mother 64    Cancer Mother     COPD Father     Heart Disease Father     Lung Cancer Father 78    Other Father         AAA     Heart Defect Sister         septal defect as a baby    Stroke Maternal Grandmother     Other Paternal Grandmother         tremors    Heart Disease Paternal Grandfather      Social History     Tobacco Use   Smoking Status Never   Smokeless Tobacco Never       Social History     Substance and Sexual Activity   Alcohol Use No       Review of Systems   Constitutional:  Negative for appetite change, chills and fever. Eyes:  Negative for redness and visual disturbance. Respiratory:  Negative for cough, shortness of breath and wheezing. Cardiovascular:  Negative for chest pain and leg swelling. Gastrointestinal:  Negative for abdominal pain, constipation, nausea and vomiting. Genitourinary:  Positive for frequency (nocturia, post-void dribbling). Negative for decreased urine volume, difficulty urinating, dysuria, enuresis, flank pain, hematuria, penile discharge, penile pain, scrotal swelling, testicular pain and urgency.    Musculoskeletal:  Negative for back pain, joint swelling and myalgias. Skin:  Negative for color change, rash and wound. Neurological:  Negative for dizziness, tremors and numbness. Hematological:  Negative for adenopathy. Does not bruise/bleed easily. /83 (Site: Left Upper Arm, Position: Sitting, Cuff Size: Large Adult)   Pulse 95   Wt 255 lb (115.7 kg)   BMI 36.59 kg/m²       PHYSICAL EXAM:  Constitutional: Patient in no acute distress; Neuro: alert and oriented to person place and time. Psych: Mood and affect normal.  Skin: Normal  Lungs: Respiratory effort normal  Cardiovascular:  Normal peripheral pulses  Abdomen: Soft, non-tender, non-distended with no CVA, flank pain  Bladder non-tender and not distended. Lab Results   Component Value Date    BUN 13 11/21/2022     Lab Results   Component Value Date    CREATININE 1.05 11/21/2022     Lab Results   Component Value Date    PSA 0.82 07/22/2022    PSA 0.63 02/01/2022    PSA 0.64 07/13/2021       ASSESSMENT:   Diagnosis Orders   1. Epididymitis        2. Acute prostatitis        3. BPH with obstruction/lower urinary tract symptoms  TN FERMIN POST-VOIDING RESIDUAL URINE&/BLADDER CAP      4. Nocturia  TN FERMIN POST-VOIDING RESIDUAL URINE&/BLADDER CAP      5.  Post-void dribbling  TN FERMIN POST-VOIDING RESIDUAL URINE&/BLADDER CAP            PLAN:  Flomax daily    Levaquin daily    F/U in 6 weeks or sooner if needed

## 2023-02-18 ENCOUNTER — HOSPITAL ENCOUNTER (OUTPATIENT)
Age: 53
Discharge: HOME | End: 2023-02-18
Payer: COMMERCIAL

## 2023-02-18 DIAGNOSIS — E78.2 MIXED HYPERLIPIDEMIA: ICD-10-CM

## 2023-02-18 DIAGNOSIS — R73.01 IMPAIRED FASTING GLUCOSE: ICD-10-CM

## 2023-02-18 DIAGNOSIS — Z12.5 SCREENING PSA (PROSTATE SPECIFIC ANTIGEN): ICD-10-CM

## 2023-02-18 DIAGNOSIS — I10 ESSENTIAL HYPERTENSION, BENIGN: ICD-10-CM

## 2023-02-18 LAB
ABSOLUTE EOS #: 0.34 K/UL (ref 0–0.44)
ABSOLUTE IMMATURE GRANULOCYTE: <0.03 K/UL (ref 0–0.3)
ABSOLUTE LYMPH #: 2.49 K/UL (ref 1.1–3.7)
ABSOLUTE MONO #: 0.58 K/UL (ref 0.1–1.2)
ALBUMIN SERPL-MCNC: 4.2 G/DL (ref 3.5–5.2)
ALBUMIN/GLOBULIN RATIO: 1.3 (ref 1–2.5)
ALP SERPL-CCNC: 77 U/L (ref 40–129)
ALT SERPL-CCNC: 41 U/L (ref 5–41)
ANION GAP SERPL CALCULATED.3IONS-SCNC: 10 MMOL/L (ref 9–17)
AST SERPL-CCNC: 32 U/L
BASOPHILS # BLD: 1 % (ref 0–2)
BASOPHILS ABSOLUTE: 0.06 K/UL (ref 0–0.2)
BILIRUB SERPL-MCNC: 0.8 MG/DL (ref 0.3–1.2)
BUN SERPL-MCNC: 12 MG/DL (ref 6–20)
BUN/CREAT BLD: 12 (ref 9–20)
CALCIUM SERPL-MCNC: 9.2 MG/DL (ref 8.6–10.4)
CHLORIDE SERPL-SCNC: 101 MMOL/L (ref 98–107)
CHOLEST SERPL-MCNC: 120 MG/DL
CHOLESTEROL/HDL RATIO: 3.3
CO2 SERPL-SCNC: 25 MMOL/L (ref 20–31)
CREAT SERPL-MCNC: 1 MG/DL (ref 0.7–1.2)
EOSINOPHILS RELATIVE PERCENT: 5 % (ref 1–4)
GFR SERPL CREATININE-BSD FRML MDRD: >60 ML/MIN/1.73M2
GLUCOSE SERPL-MCNC: 130 MG/DL (ref 70–99)
HCT VFR BLD AUTO: 48.7 % (ref 40.7–50.3)
HDLC SERPL-MCNC: 36 MG/DL
HGB BLD-MCNC: 16.3 G/DL (ref 13–17)
IMMATURE GRANULOCYTES: 0 %
LDLC SERPL CALC-MCNC: 44 MG/DL (ref 0–130)
LYMPHOCYTES # BLD: 38 % (ref 24–43)
MCH RBC QN AUTO: 30.2 PG (ref 25.2–33.5)
MCHC RBC AUTO-ENTMCNC: 33.5 G/DL (ref 28.4–34.8)
MCV RBC AUTO: 90.2 FL (ref 82.6–102.9)
MONOCYTES # BLD: 9 % (ref 3–12)
NRBC AUTOMATED: 0 PER 100 WBC
PDW BLD-RTO: 13.5 % (ref 11.8–14.4)
PLATELET # BLD AUTO: 222 K/UL (ref 138–453)
PMV BLD AUTO: 10.8 FL (ref 8.1–13.5)
POTASSIUM SERPL-SCNC: 4.3 MMOL/L (ref 3.7–5.3)
PROSTATE SPECIFIC ANTIGEN: 0.56 NG/ML
PROT SERPL-MCNC: 7.4 G/DL (ref 6.4–8.3)
RBC # BLD: 5.4 M/UL (ref 4.21–5.77)
SEG NEUTROPHILS: 47 % (ref 36–65)
SEGMENTED NEUTROPHILS ABSOLUTE COUNT: 3.1 K/UL (ref 1.5–8.1)
SODIUM SERPL-SCNC: 136 MMOL/L (ref 135–144)
TRIGL SERPL-MCNC: 198 MG/DL
WBC # BLD AUTO: 6.6 K/UL (ref 3.5–11.3)

## 2023-02-18 PROCEDURE — 36415 COLL VENOUS BLD VENIPUNCTURE: CPT

## 2023-02-18 PROCEDURE — 80061 LIPID PANEL: CPT

## 2023-02-18 PROCEDURE — G0103 PSA SCREENING: HCPCS

## 2023-02-18 PROCEDURE — 85025 COMPLETE CBC W/AUTO DIFF WBC: CPT

## 2023-02-18 PROCEDURE — 83036 HEMOGLOBIN GLYCOSYLATED A1C: CPT

## 2023-02-18 PROCEDURE — 80053 COMPREHEN METABOLIC PANEL: CPT

## 2023-02-19 LAB
EST. AVERAGE GLUCOSE BLD GHB EST-MCNC: 143 MG/DL
HBA1C MFR BLD: 6.6 % (ref 4–6)

## 2023-03-12 ENCOUNTER — APPOINTMENT (OUTPATIENT)
Dept: CT IMAGING | Age: 53
End: 2023-03-12
Payer: COMMERCIAL

## 2023-03-12 ENCOUNTER — HOSPITAL ENCOUNTER (EMERGENCY)
Age: 53
Discharge: HOME | End: 2023-03-12
Attending: EMERGENCY MEDICINE
Payer: COMMERCIAL

## 2023-03-12 VITALS
DIASTOLIC BLOOD PRESSURE: 97 MMHG | TEMPERATURE: 98.2 F | WEIGHT: 250 LBS | SYSTOLIC BLOOD PRESSURE: 140 MMHG | RESPIRATION RATE: 13 BRPM | HEART RATE: 65 BPM | BODY MASS INDEX: 35.87 KG/M2 | OXYGEN SATURATION: 96 %

## 2023-03-12 DIAGNOSIS — G51.8 FACIAL NEURALGIA: Primary | ICD-10-CM

## 2023-03-12 LAB
ABSOLUTE EOS #: 0.3 K/UL (ref 0–0.44)
ABSOLUTE IMMATURE GRANULOCYTE: <0.03 K/UL (ref 0–0.3)
ABSOLUTE LYMPH #: 2.16 K/UL (ref 1.1–3.7)
ABSOLUTE MONO #: 0.66 K/UL (ref 0.1–1.2)
ANION GAP SERPL CALCULATED.3IONS-SCNC: 11 MMOL/L (ref 9–17)
BASOPHILS # BLD: 1 % (ref 0–2)
BASOPHILS ABSOLUTE: 0.06 K/UL (ref 0–0.2)
BUN SERPL-MCNC: 11 MG/DL (ref 6–20)
BUN/CREAT BLD: 12 (ref 9–20)
CALCIUM SERPL-MCNC: 9.3 MG/DL (ref 8.6–10.4)
CHLORIDE SERPL-SCNC: 100 MMOL/L (ref 98–107)
CO2 SERPL-SCNC: 24 MMOL/L (ref 20–31)
CREAT SERPL-MCNC: 0.92 MG/DL (ref 0.7–1.2)
EKG ATRIAL RATE: 71 BPM
EKG P AXIS: 26 DEGREES
EKG P-R INTERVAL: 138 MS
EKG Q-T INTERVAL: 398 MS
EKG QRS DURATION: 82 MS
EKG QTC CALCULATION (BAZETT): 432 MS
EKG R AXIS: -7 DEGREES
EKG T AXIS: 42 DEGREES
EKG VENTRICULAR RATE: 71 BPM
EOSINOPHILS RELATIVE PERCENT: 4 % (ref 1–4)
GFR SERPL CREATININE-BSD FRML MDRD: >60 ML/MIN/1.73M2
GLUCOSE SERPL-MCNC: 116 MG/DL (ref 70–99)
HCT VFR BLD AUTO: 51 % (ref 40.7–50.3)
HGB BLD-MCNC: 16.6 G/DL (ref 13–17)
IMMATURE GRANULOCYTES: 0 %
LYMPHOCYTES # BLD: 31 % (ref 24–43)
MCH RBC QN AUTO: 28.9 PG (ref 25.2–33.5)
MCHC RBC AUTO-ENTMCNC: 32.5 G/DL (ref 28.4–34.8)
MCV RBC AUTO: 88.9 FL (ref 82.6–102.9)
MONOCYTES # BLD: 10 % (ref 3–12)
NRBC AUTOMATED: 0 PER 100 WBC
PDW BLD-RTO: 13.4 % (ref 11.8–14.4)
PLATELET # BLD AUTO: 243 K/UL (ref 138–453)
PMV BLD AUTO: 10.1 FL (ref 8.1–13.5)
POTASSIUM SERPL-SCNC: 4.3 MMOL/L (ref 3.7–5.3)
RBC # BLD: 5.74 M/UL (ref 4.21–5.77)
SEG NEUTROPHILS: 54 % (ref 36–65)
SEGMENTED NEUTROPHILS ABSOLUTE COUNT: 3.72 K/UL (ref 1.5–8.1)
SODIUM SERPL-SCNC: 135 MMOL/L (ref 135–144)
TROPONIN I SERPL DL<=0.01 NG/ML-MCNC: 9 NG/L (ref 0–22)
WBC # BLD AUTO: 6.9 K/UL (ref 3.5–11.3)

## 2023-03-12 PROCEDURE — 6360000004 HC RX CONTRAST MEDICATION: Performed by: EMERGENCY MEDICINE

## 2023-03-12 PROCEDURE — 84484 ASSAY OF TROPONIN QUANT: CPT

## 2023-03-12 PROCEDURE — 99285 EMERGENCY DEPT VISIT HI MDM: CPT

## 2023-03-12 PROCEDURE — 93010 ELECTROCARDIOGRAM REPORT: CPT | Performed by: INTERNAL MEDICINE

## 2023-03-12 PROCEDURE — 70498 CT ANGIOGRAPHY NECK: CPT

## 2023-03-12 PROCEDURE — 93005 ELECTROCARDIOGRAM TRACING: CPT | Performed by: EMERGENCY MEDICINE

## 2023-03-12 PROCEDURE — 85025 COMPLETE CBC W/AUTO DIFF WBC: CPT

## 2023-03-12 PROCEDURE — 2580000003 HC RX 258: Performed by: EMERGENCY MEDICINE

## 2023-03-12 PROCEDURE — 36415 COLL VENOUS BLD VENIPUNCTURE: CPT

## 2023-03-12 PROCEDURE — 80048 BASIC METABOLIC PNL TOTAL CA: CPT

## 2023-03-12 RX ORDER — 0.9 % SODIUM CHLORIDE 0.9 %
1000 INTRAVENOUS SOLUTION INTRAVENOUS ONCE
Status: COMPLETED | OUTPATIENT
Start: 2023-03-12 | End: 2023-03-12

## 2023-03-12 RX ORDER — GABAPENTIN 100 MG/1
100 CAPSULE ORAL 3 TIMES DAILY
Qty: 30 CAPSULE | Refills: 0 | Status: SHIPPED | OUTPATIENT
Start: 2023-03-12 | End: 2023-03-15 | Stop reason: ALTCHOICE

## 2023-03-12 RX ADMIN — SODIUM CHLORIDE 1000 ML: 9 INJECTION, SOLUTION INTRAVENOUS at 09:50

## 2023-03-12 RX ADMIN — IOPAMIDOL 75 ML: 755 INJECTION, SOLUTION INTRAVENOUS at 10:27

## 2023-03-12 ASSESSMENT — LIFESTYLE VARIABLES
HOW OFTEN DO YOU HAVE A DRINK CONTAINING ALCOHOL: NEVER
HOW MANY STANDARD DRINKS CONTAINING ALCOHOL DO YOU HAVE ON A TYPICAL DAY: PATIENT DOES NOT DRINK

## 2023-03-12 ASSESSMENT — PAIN - FUNCTIONAL ASSESSMENT: PAIN_FUNCTIONAL_ASSESSMENT: 0-10

## 2023-03-12 ASSESSMENT — PAIN DESCRIPTION - DESCRIPTORS: DESCRIPTORS: ACHING

## 2023-03-12 ASSESSMENT — PAIN SCALES - GENERAL: PAINLEVEL_OUTOF10: 3

## 2023-03-12 ASSESSMENT — PAIN DESCRIPTION - LOCATION: LOCATION: NECK;FACE;JAW

## 2023-03-12 ASSESSMENT — PAIN DESCRIPTION - ORIENTATION: ORIENTATION: RIGHT

## 2023-03-12 NOTE — ED PROVIDER NOTES
HPI:  3/12/23,   Time: 9:33 AM EDT         Brenda Mendez is a 45 y.o. male presenting to the ED for gradual onset of pain in anterior neck that seem to radiate to his jaw and teeth and behind his right eye, beginning approximately 4 hours ago. The complaint has been constant, moderate in severity, and worsened by nothing. No alleviating factors. No numbness weakness or tingling of the arms or legs and no difficulty walking or talking or seeing. Denies headache or syncope or convulsions or vertigo. No recent injury. No fever chills night sweats or chest pain or shortness of breath or diaphoresis    ROS:   Pertinent positives and negatives are stated within HPI, all other systems reviewed and are negative.  --------------------------------------------- PAST HISTORY ---------------------------------------------  Past Medical History:  has a past medical history of Anxiety, Asthma, Brain lesion, Depression, Essential and other specified forms of tremor, Fatty liver disease, nonalcoholic, GERD (gastroesophageal reflux disease), H/O echocardiogram, Herniated disc, History of echocardiogram, History of Holter monitoring, History of stress test, Hx of tilt table evaluation, Hyperlipidemia, Hypertension, Impaired fasting glucose, Kidney stone, Obstructive sleep apnea (adult) (pediatric), and Sinus tachycardia. Past Surgical History:  has a past surgical history that includes Cholecystectomy (08/2011); Tonsillectomy (1990); Hemorrhoid surgery (2007); Bladder stone removal (1996); Colonoscopy (2017); Cardiac catheterization (Left, 05/12/2017); Umbilical hernia repair (08/2011); Inguinal hernia repair (Left, 2017); cervical fusion (08/27/2020); and Colonoscopy (2021). Social History:  reports that he has never smoked. He has never used smokeless tobacco. He reports that he does not drink alcohol and does not use drugs.     Family History: family history includes COPD in his father; Cancer in his mother; Colon Cancer (age of onset: 64) in his mother; Depression in his mother; Heart Defect in his sister; Heart Disease in his father and paternal grandfather; Nena Bush (age of onset: 78) in his father; Other in his father and paternal grandmother; Stroke in his maternal grandmother. The patients home medications have been reviewed.     Allergies: Mobic [meloxicam] and Statins    -------------------------------------------------- RESULTS -------------------------------------------------  All laboratory and radiology results have been personally reviewed by myself   LABS:  Results for orders placed or performed during the hospital encounter of 03/12/23   BMP   Result Value Ref Range    Glucose 116 (H) 70 - 99 mg/dL    BUN 11 6 - 20 mg/dL    Creatinine 0.92 0.70 - 1.20 mg/dL    Est, Glom Filt Rate >60 >60 mL/min/1.73m2    Bun/Cre Ratio 12 9 - 20    Calcium 9.3 8.6 - 10.4 mg/dL    Sodium 135 135 - 144 mmol/L    Potassium 4.3 3.7 - 5.3 mmol/L    Chloride 100 98 - 107 mmol/L    CO2 24 20 - 31 mmol/L    Anion Gap 11 9 - 17 mmol/L   CBC with Auto Differential   Result Value Ref Range    WBC 6.9 3.5 - 11.3 k/uL    RBC 5.74 4.21 - 5.77 m/uL    Hemoglobin 16.6 13.0 - 17.0 g/dL    Hematocrit 51.0 (H) 40.7 - 50.3 %    MCV 88.9 82.6 - 102.9 fL    MCH 28.9 25.2 - 33.5 pg    MCHC 32.5 28.4 - 34.8 g/dL    RDW 13.4 11.8 - 14.4 %    Platelets 830 714 - 672 k/uL    MPV 10.1 8.1 - 13.5 fL    NRBC Automated 0.0 0.0 per 100 WBC    Seg Neutrophils 54 36 - 65 %    Lymphocytes 31 24 - 43 %    Monocytes 10 3 - 12 %    Eosinophils % 4 1 - 4 %    Basophils 1 0 - 2 %    Immature Granulocytes 0 0 %    Segs Absolute 3.72 1.50 - 8.10 k/uL    Absolute Lymph # 2.16 1.10 - 3.70 k/uL    Absolute Mono # 0.66 0.10 - 1.20 k/uL    Absolute Eos # 0.30 0.00 - 0.44 k/uL    Basophils Absolute 0.06 0.00 - 0.20 k/uL    Absolute Immature Granulocyte <0.03 0.00 - 0.30 k/uL   Troponin   Result Value Ref Range    Troponin, High Sensitivity 9 0 - 22 ng/L RADIOLOGY:  Interpreted by Radiologist.  CTA HEAD NECK W CONTRAST   Final Result   Unremarkable CTA of the head and neck. ------------------------- NURSING NOTES AND VITALS REVIEWED ---------------------------   The nursing notes within the ED encounter and vital signs as below have been reviewed. BP (!) 152/93   Pulse 73   Temp 98.2 °F (36.8 °C) (Oral)   Resp 16   Wt 250 lb (113.4 kg)   SpO2 94%   BMI 35.87 kg/m²   Oxygen Saturation Interpretation: Normal      ---------------------------------------------------PHYSICAL EXAM--------------------------------------      Constitutional/General: Alert and oriented x3, well appearing, non toxic in NAD  Head: NC/AT  Eyes: PERRL, EOMI  Mouth: Oropharynx clear, handling secretions, no trismus  Neck: Supple, full ROM, no meningeal signs, no bruits  Pulmonary: Lungs clear to auscultation bilaterally, no wheezes, rales, or rhonchi. Not in respiratory distress  Cardiovascular:  Regular rate and rhythm, no murmurs, gallops, or rubs. 2+ distal pulses  Abdomen: Soft, non tender, non distended,   Extremities: Moves all extremities x 4. Warm and well perfused  Skin: warm and dry without rash  Neurologic: GCS 15,  Psych: Normal Affect      ------------------------------ ED COURSE/MEDICAL DECISION MAKING----------------------  Medications   0.9 % sodium chloride bolus (1,000 mLs IntraVENous New Bag 3/12/23 1154)   iopamidol (ISOVUE-370) 76 % injection 75 mL (75 mLs IntraVENous Given 3/12/23 1027)         Medical Decision Making:    Facial pain and dysesthesia rule out stroke or carotid claudication versus trigeminal neuralgia, CTA of head and neck negative and troponin normal and EKG normal    Counseling: The emergency provider has spoken with the patient and discussed todays results, in addition to providing specific details for the plan of care and counseling regarding the diagnosis and prognosis.   Questions are answered at this time and they are agreeable with the plan.      --------------------------------- IMPRESSION AND DISPOSITION ---------------------------------    IMPRESSION  1.  Facial neuralgia Stable       DISPOSITION  Disposition: Discharge to home  Patient condition is stable                  Rita Farias MD  03/12/23 6639

## 2023-03-20 ENCOUNTER — OFFICE VISIT (OUTPATIENT)
Dept: UROLOGY | Age: 53
End: 2023-03-20
Payer: COMMERCIAL

## 2023-03-20 VITALS
WEIGHT: 255 LBS | BODY MASS INDEX: 36.51 KG/M2 | SYSTOLIC BLOOD PRESSURE: 138 MMHG | DIASTOLIC BLOOD PRESSURE: 86 MMHG | HEIGHT: 70 IN | TEMPERATURE: 97.1 F

## 2023-03-20 DIAGNOSIS — G89.29 GROIN PAIN, CHRONIC, LEFT: ICD-10-CM

## 2023-03-20 DIAGNOSIS — N13.8 BPH WITH OBSTRUCTION/LOWER URINARY TRACT SYMPTOMS: Primary | ICD-10-CM

## 2023-03-20 DIAGNOSIS — R10.32 GROIN PAIN, CHRONIC, LEFT: ICD-10-CM

## 2023-03-20 DIAGNOSIS — N40.1 BPH WITH OBSTRUCTION/LOWER URINARY TRACT SYMPTOMS: Primary | ICD-10-CM

## 2023-03-20 PROCEDURE — 99214 OFFICE O/P EST MOD 30 MIN: CPT | Performed by: NURSE PRACTITIONER

## 2023-03-20 PROCEDURE — 51798 US URINE CAPACITY MEASURE: CPT | Performed by: NURSE PRACTITIONER

## 2023-03-20 RX ORDER — TAMSULOSIN HYDROCHLORIDE 0.4 MG/1
0.4 CAPSULE ORAL DAILY
Qty: 90 CAPSULE | Refills: 3 | Status: SHIPPED | OUTPATIENT
Start: 2023-03-20

## 2023-03-20 RX ORDER — LORATADINE 10 MG/1
10 CAPSULE, LIQUID FILLED ORAL DAILY
COMMUNITY

## 2023-03-20 NOTE — PATIENT INSTRUCTIONS
Physical therapy for left groin pain and left testicular pain, pelvic floor physical therapy      SURVEY:    You may be receiving a survey from [employer] regarding your visit today. Please complete the survey to enable us to provide the highest quality of care to you and your family. If you cannot score us a very good on any question, please call the office to discuss how we could have made your experience a very good one. Thank you.

## 2023-03-20 NOTE — PROGRESS NOTES
Random bladderscan performed in office today:  Pt last voided 60 mins ago, scan = 67 mL
daily INHALE 2 PUFFS BY MOUTH 2 TIMES A DAY.) 10.2 g 5    omeprazole (PRILOSEC) 40 MG delayed release capsule TAKE 1 CAPSULE BY MOUTH ONCE DAILY (Patient taking differently: Take 40 mg by mouth daily TAKE 1 CAPSULE BY MOUTH ONCE DAILY) 90 capsule 3    aspirin 81 MG chewable tablet Take 81 mg by mouth daily       No current facility-administered medications on file prior to visit. Symbicort [budesonide-formoterol fumarate], Mobic [meloxicam], and Statins  Family History   Problem Relation Age of Onset    Depression Mother     Colon Cancer Mother 64    Cancer Mother     COPD Father     Heart Disease Father     Lung Cancer Father 78    Other Father         AAA     Heart Defect Sister         septal defect as a baby    Stroke Maternal Grandmother     Other Paternal Grandmother         tremors    Heart Disease Paternal Grandfather      Social History     Tobacco Use   Smoking Status Never   Smokeless Tobacco Never       Social History     Substance and Sexual Activity   Alcohol Use No       Review of Systems   Constitutional:  Negative for appetite change, chills and fever. Eyes:  Negative for redness and visual disturbance. Respiratory:  Negative for cough, shortness of breath and wheezing. Cardiovascular:  Negative for chest pain and leg swelling. Gastrointestinal:  Negative for abdominal pain, constipation, nausea and vomiting. Genitourinary:  Negative for decreased urine volume, difficulty urinating, dysuria, enuresis, flank pain, frequency, hematuria, penile discharge, penile pain, scrotal swelling, testicular pain and urgency. Musculoskeletal:  Negative for back pain, joint swelling and myalgias. Skin:  Negative for color change, rash and wound. Neurological:  Negative for dizziness, tremors and numbness. Hematological:  Negative for adenopathy. Does not bruise/bleed easily.      /86 (Site: Right Upper Arm, Position: Sitting, Cuff Size: Medium Adult)   Temp 97.1 °F (36.2 °C) (Infrared)

## 2023-03-21 ASSESSMENT — ENCOUNTER SYMPTOMS
ABDOMINAL PAIN: 0
CONSTIPATION: 0
SHORTNESS OF BREATH: 0
COUGH: 0
COLOR CHANGE: 0
VOMITING: 0
EYE REDNESS: 0
WHEEZING: 0
NAUSEA: 0
BACK PAIN: 0

## 2023-04-27 ENCOUNTER — HOSPITAL ENCOUNTER (OUTPATIENT)
Age: 53
Setting detail: SPECIMEN
Discharge: HOME | End: 2023-04-27
Payer: COMMERCIAL

## 2023-04-27 ENCOUNTER — HOSPITAL ENCOUNTER (OUTPATIENT)
Dept: GENERAL RADIOLOGY | Age: 53
Discharge: HOME | End: 2023-04-29
Payer: COMMERCIAL

## 2023-04-27 ENCOUNTER — HOSPITAL ENCOUNTER (OUTPATIENT)
Age: 53
Discharge: HOME | End: 2023-04-29
Payer: COMMERCIAL

## 2023-04-27 DIAGNOSIS — M54.50 PAIN IN LEFT LUMBAR REGION OF BACK: ICD-10-CM

## 2023-04-27 PROCEDURE — 87086 URINE CULTURE/COLONY COUNT: CPT

## 2023-04-27 PROCEDURE — 72110 X-RAY EXAM L-2 SPINE 4/>VWS: CPT

## 2023-04-28 LAB
MICROORGANISM SPEC CULT: NO GROWTH
SPECIMEN DESCRIPTION: NORMAL

## 2023-07-20 ENCOUNTER — OFFICE VISIT (OUTPATIENT)
Dept: CARDIOLOGY | Age: 53
End: 2023-07-20
Payer: COMMERCIAL

## 2023-07-20 VITALS
RESPIRATION RATE: 18 BRPM | HEART RATE: 108 BPM | BODY MASS INDEX: 34.79 KG/M2 | WEIGHT: 243 LBS | DIASTOLIC BLOOD PRESSURE: 73 MMHG | HEIGHT: 70 IN | SYSTOLIC BLOOD PRESSURE: 109 MMHG | OXYGEN SATURATION: 92 %

## 2023-07-20 DIAGNOSIS — E78.2 MIXED HYPERLIPIDEMIA: ICD-10-CM

## 2023-07-20 DIAGNOSIS — E66.9 OBESITY, CLASS II, BMI 35-39.9: ICD-10-CM

## 2023-07-20 DIAGNOSIS — R07.9 CHEST PAIN, UNSPECIFIED TYPE: ICD-10-CM

## 2023-07-20 DIAGNOSIS — I25.10 ASHD (ARTERIOSCLEROTIC HEART DISEASE): Primary | ICD-10-CM

## 2023-07-20 DIAGNOSIS — Z82.49 FAMILY HISTORY OF ABDOMINAL AORTIC ANEURYSM (AAA): ICD-10-CM

## 2023-07-20 DIAGNOSIS — G47.33 OSA (OBSTRUCTIVE SLEEP APNEA): ICD-10-CM

## 2023-07-20 PROCEDURE — 99214 OFFICE O/P EST MOD 30 MIN: CPT | Performed by: INTERNAL MEDICINE

## 2023-07-20 PROCEDURE — 93000 ELECTROCARDIOGRAM COMPLETE: CPT | Performed by: INTERNAL MEDICINE

## 2023-07-20 NOTE — PATIENT INSTRUCTIONS
SURVEY:    You may be receiving a survey from [employer] regarding your visit today. Please complete the survey to enable us to provide the highest quality of care to you and your family. If you cannot score us a very good on any question, please call the office to discuss how we could have made your experience a very good one. Thank you.

## 2023-07-20 NOTE — PROGRESS NOTES
Ayleen Krishnan am scribing for and in the presence of Miladis Ash MD, F.A.C.C..    Subjective:     CHIEF COMPLAINT / HPI:   Chief Complaint   Patient presents with    Follow-up     HX: ASHD, HLD, WES, AAA, CP, obese. Pt is here for a yearly follow up. He was in the ED on on 3/12/23 for facial neurologia. Pt says he has been doing okay. Pt says he has CP every once in a while about 4 or 5 times in the last year that feels like an ache in center of chest lasts a minute and a half. He says he also gets a weakness in his left arm and it aches and it has happened twice since his last visit lasts for a couple of minutes. SOB if its humid and he is working hard. 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 male with multiple medical problems as outlined below who initially presented for evaluation of chest pain and dizziness. History of negative stress test on 1/6/2016. Patient had CTA of aorta to rule out aortic aneurysm and found to have dense calcification of the left main coronary artery leading to cardiac catheterization which revealed mild CAD without any focal stenosis. No prior history of myocardial infarction or heart failure. History of anxiety/panic attacks. History of sleep apnea, not using CPAP. History of dyslipidemia and statin intolerance. Finally tolerating Livalo 1 mg daily, started in July 2017. Tilt done in 2016, was abnormal, Heart rate,blood pressure response and symptoms were most consistent with dysautonomia. Holter done in 2016, sinus rhythm with sinus tachycardia 27% of the study duration,average HR 90 bpm ranging between 51 - 147 bpm. Rare isolated PAC's and PVC's    ECG done on (11/11/2018)- Normal sinus rhythm with sinus arrhythmia. Normal ECG. When compared with ECG of 06-OCT-2017 16:13, no significant change was found    EKG done in office 5/17/2019 that showed sinus rhythm with no acute ischemic changes.     Stress test done 5/17/2019- EF 61% Normal

## 2023-07-31 ENCOUNTER — HOSPITAL ENCOUNTER (OUTPATIENT)
Age: 53
Discharge: HOME | End: 2023-08-02
Attending: INTERNAL MEDICINE
Payer: COMMERCIAL

## 2023-07-31 DIAGNOSIS — I25.10 ASHD (ARTERIOSCLEROTIC HEART DISEASE): ICD-10-CM

## 2023-07-31 DIAGNOSIS — Z82.49 FAMILY HISTORY OF ABDOMINAL AORTIC ANEURYSM (AAA): ICD-10-CM

## 2023-07-31 DIAGNOSIS — Z82.49 FAMILY HISTORY OF ISCHEMIC HEART DISEASE: ICD-10-CM

## 2023-07-31 DIAGNOSIS — E66.9 CLASS 2 OBESITY WITH BODY MASS INDEX (BMI) OF 35.0 TO 35.9 IN ADULT, UNSPECIFIED OBESITY TYPE, UNSPECIFIED WHETHER SERIOUS COMORBIDITY PRESENT: ICD-10-CM

## 2023-07-31 DIAGNOSIS — I25.10 ARTERIOSCLEROTIC CARDIOVASCULAR DISEASE: ICD-10-CM

## 2023-07-31 DIAGNOSIS — R07.9 CHEST PAIN, UNSPECIFIED TYPE: ICD-10-CM

## 2023-07-31 DIAGNOSIS — E78.2 MIXED HYPERLIPIDEMIA: ICD-10-CM

## 2023-07-31 DIAGNOSIS — G47.33 OSA (OBSTRUCTIVE SLEEP APNEA): ICD-10-CM

## 2023-07-31 DIAGNOSIS — E66.9 OBESITY, CLASS II, BMI 35-39.9: ICD-10-CM

## 2023-07-31 PROCEDURE — 93246 EXT ECG>7D<15D RECORDING: CPT

## 2023-08-12 ENCOUNTER — HOSPITAL ENCOUNTER (OUTPATIENT)
Age: 53
Discharge: HOME | End: 2023-08-12
Payer: COMMERCIAL

## 2023-08-12 DIAGNOSIS — R73.01 IMPAIRED FASTING GLUCOSE: ICD-10-CM

## 2023-08-12 DIAGNOSIS — I10 ESSENTIAL HYPERTENSION, BENIGN: ICD-10-CM

## 2023-08-12 DIAGNOSIS — E78.2 MIXED HYPERLIPIDEMIA: ICD-10-CM

## 2023-08-12 LAB
ALT SERPL-CCNC: 32 U/L (ref 5–41)
ANION GAP SERPL CALCULATED.3IONS-SCNC: 10 MMOL/L (ref 9–17)
AST SERPL-CCNC: 23 U/L
BUN SERPL-MCNC: 16 MG/DL (ref 6–20)
BUN/CREAT SERPL: 18 (ref 9–20)
CALCIUM SERPL-MCNC: 9.2 MG/DL (ref 8.6–10.4)
CHLORIDE SERPL-SCNC: 105 MMOL/L (ref 98–107)
CHOLEST SERPL-MCNC: 123 MG/DL
CHOLESTEROL/HDL RATIO: 3.3
CO2 SERPL-SCNC: 24 MMOL/L (ref 20–31)
CREAT SERPL-MCNC: 0.9 MG/DL (ref 0.7–1.2)
CREAT UR-MCNC: 187.3 MG/DL (ref 39–259)
ERYTHROCYTE [DISTWIDTH] IN BLOOD BY AUTOMATED COUNT: 13.5 % (ref 11.8–14.4)
EST. AVERAGE GLUCOSE BLD GHB EST-MCNC: 137 MG/DL
GFR SERPL CREATININE-BSD FRML MDRD: >60 ML/MIN/1.73M2
GLUCOSE SERPL-MCNC: 118 MG/DL (ref 70–99)
HBA1C MFR BLD: 6.4 % (ref 4–6)
HCT VFR BLD AUTO: 48.3 % (ref 40.7–50.3)
HDLC SERPL-MCNC: 37 MG/DL
HGB BLD-MCNC: 16.1 G/DL (ref 13–17)
LDLC SERPL CALC-MCNC: 44 MG/DL (ref 0–130)
MCH RBC QN AUTO: 29.3 PG (ref 25.2–33.5)
MCHC RBC AUTO-ENTMCNC: 33.3 G/DL (ref 28.4–34.8)
MCV RBC AUTO: 88 FL (ref 82.6–102.9)
MICROALBUMIN UR-MCNC: 15 MG/L
MICROALBUMIN/CREAT UR-RTO: 8 MCG/MG CREAT
NRBC BLD-RTO: 0 PER 100 WBC
PLATELET # BLD AUTO: 226 K/UL (ref 138–453)
PMV BLD AUTO: 10 FL (ref 8.1–13.5)
POTASSIUM SERPL-SCNC: 4.5 MMOL/L (ref 3.7–5.3)
RBC # BLD AUTO: 5.49 M/UL (ref 4.21–5.77)
SODIUM SERPL-SCNC: 139 MMOL/L (ref 135–144)
TRIGL SERPL-MCNC: 208 MG/DL
WBC OTHER # BLD: 7.9 K/UL (ref 3.5–11.3)

## 2023-08-12 PROCEDURE — 83036 HEMOGLOBIN GLYCOSYLATED A1C: CPT

## 2023-08-12 PROCEDURE — 84450 TRANSFERASE (AST) (SGOT): CPT

## 2023-08-12 PROCEDURE — 36415 COLL VENOUS BLD VENIPUNCTURE: CPT

## 2023-08-12 PROCEDURE — 82570 ASSAY OF URINE CREATININE: CPT

## 2023-08-12 PROCEDURE — 82043 UR ALBUMIN QUANTITATIVE: CPT

## 2023-08-12 PROCEDURE — 85027 COMPLETE CBC AUTOMATED: CPT

## 2023-08-12 PROCEDURE — 80048 BASIC METABOLIC PNL TOTAL CA: CPT

## 2023-08-12 PROCEDURE — 80061 LIPID PANEL: CPT

## 2023-08-12 PROCEDURE — 84460 ALANINE AMINO (ALT) (SGPT): CPT

## 2023-08-15 ENCOUNTER — HOSPITAL ENCOUNTER (OUTPATIENT)
Dept: MRI IMAGING | Age: 53
Discharge: HOME | End: 2023-08-17
Attending: INTERNAL MEDICINE
Payer: COMMERCIAL

## 2023-08-15 DIAGNOSIS — M54.50 LUMBAR PAIN: ICD-10-CM

## 2023-08-15 PROCEDURE — 72148 MRI LUMBAR SPINE W/O DYE: CPT

## 2023-08-21 ENCOUNTER — TELEPHONE (OUTPATIENT)
Dept: CARDIOLOGY | Age: 53
End: 2023-08-21

## 2023-08-21 NOTE — TELEPHONE ENCOUNTER
----- Message from Steven Cullen MD sent at 8/20/2023  8:36 PM EDT -----  Heart monitor is okay, continue current therapy and follow up. Please call with questions and/or concerns.  Thank you

## 2023-08-29 ENCOUNTER — HOSPITAL ENCOUNTER (OUTPATIENT)
Age: 53
Discharge: HOME | End: 2023-08-31
Attending: INTERNAL MEDICINE
Payer: COMMERCIAL

## 2023-08-29 VITALS
SYSTOLIC BLOOD PRESSURE: 106 MMHG | HEIGHT: 70 IN | WEIGHT: 245 LBS | DIASTOLIC BLOOD PRESSURE: 70 MMHG | BODY MASS INDEX: 35.07 KG/M2

## 2023-08-29 DIAGNOSIS — I25.10 ASHD (ARTERIOSCLEROTIC HEART DISEASE): ICD-10-CM

## 2023-08-29 DIAGNOSIS — E78.2 MIXED HYPERLIPIDEMIA: ICD-10-CM

## 2023-08-29 DIAGNOSIS — G47.33 OSA (OBSTRUCTIVE SLEEP APNEA): ICD-10-CM

## 2023-08-29 DIAGNOSIS — E66.9 CLASS 2 OBESITY WITH BODY MASS INDEX (BMI) OF 35.0 TO 35.9 IN ADULT, UNSPECIFIED OBESITY TYPE, UNSPECIFIED WHETHER SERIOUS COMORBIDITY PRESENT: ICD-10-CM

## 2023-08-29 DIAGNOSIS — R07.9 CHEST PAIN, UNSPECIFIED TYPE: ICD-10-CM

## 2023-08-29 DIAGNOSIS — Z82.49 FAMILY HISTORY OF ISCHEMIC HEART DISEASE: ICD-10-CM

## 2023-08-29 LAB
ECHO AO ROOT DIAM: 3.2 CM
ECHO AO ROOT INDEX: 1.4 CM/M2
ECHO AV ACCELERATION TIME: 78.27 MS
ECHO AV CUSP MM: 2.3 CM
ECHO AV MEAN GRADIENT: 4 MMHG
ECHO AV MEAN VELOCITY: 1 M/S
ECHO AV PEAK VELOCITY: 1.3 M/S
ECHO AV VTI: 23.2 CM
ECHO BSA: 2.34 M2
ECHO LA DIAMETER INDEX: 1.93 CM/M2
ECHO LA DIAMETER: 4.4 CM
ECHO LA MAJOR AXIS: 4.7 CM
ECHO LA TO AORTIC ROOT RATIO: 1.38
ECHO LA VOL 4C: 32 ML (ref 18–58)
ECHO LA VOLUME INDEX A4C: 14 ML/M2 (ref 16–34)
ECHO LV E' LATERAL VELOCITY: 9 CM/S
ECHO LV EJECTION FRACTION BIPLANE: 57 % (ref 55–100)
ECHO LV FRACTIONAL SHORTENING: 31 % (ref 28–44)
ECHO LV INTERNAL DIMENSION DIASTOLE INDEX: 2.28 CM/M2
ECHO LV INTERNAL DIMENSION DIASTOLIC: 5.2 CM (ref 4.2–5.9)
ECHO LV INTERNAL DIMENSION SYSTOLIC INDEX: 1.58 CM/M2
ECHO LV INTERNAL DIMENSION SYSTOLIC: 3.6 CM
ECHO LV IVSD: 1.2 CM (ref 0.6–1)
ECHO LV IVSS: 1.5 CM
ECHO LV MASS 2D: 234.6 G (ref 88–224)
ECHO LV MASS INDEX 2D: 102.9 G/M2 (ref 49–115)
ECHO LV POSTERIOR WALL DIASTOLIC: 1.1 CM (ref 0.6–1)
ECHO LV POSTERIOR WALL SYSTOLIC: 1.3 CM
ECHO LV RELATIVE WALL THICKNESS RATIO: 0.42
ECHO LVOT AV VTI INDEX: 0.7
ECHO LVOT MEAN GRADIENT: 1 MMHG
ECHO LVOT VTI: 16.2 CM
ECHO MV A VELOCITY: 0.74 M/S
ECHO MV E DECELERATION TIME (DT): 298.4 MS
ECHO MV E VELOCITY: 0.6 M/S
ECHO MV E/A RATIO: 0.81
ECHO MV E/E' LATERAL: 6.67
ECHO PV MAX VELOCITY: 1 M/S
ECHO PV PEAK GRADIENT: 4 MMHG

## 2023-08-29 PROCEDURE — 93306 TTE W/DOPPLER COMPLETE: CPT

## 2023-08-29 PROCEDURE — 93306 TTE W/DOPPLER COMPLETE: CPT | Performed by: FAMILY MEDICINE

## 2023-08-31 ENCOUNTER — TELEPHONE (OUTPATIENT)
Dept: CARDIOLOGY | Age: 53
End: 2023-08-31

## 2023-08-31 NOTE — TELEPHONE ENCOUNTER
----- Message from Dulce Shannon MD sent at 8/30/2023  8:55 PM EDT -----  Echo didn't change from before.

## 2023-10-18 ENCOUNTER — HOSPITAL ENCOUNTER (OUTPATIENT)
Age: 53
Setting detail: OBSERVATION
Discharge: HOME | End: 2023-10-20
Attending: STUDENT IN AN ORGANIZED HEALTH CARE EDUCATION/TRAINING PROGRAM | Admitting: INTERNAL MEDICINE
Payer: COMMERCIAL

## 2023-10-18 ENCOUNTER — APPOINTMENT (OUTPATIENT)
Dept: GENERAL RADIOLOGY | Age: 53
End: 2023-10-18
Payer: COMMERCIAL

## 2023-10-18 DIAGNOSIS — R07.9 CHEST PAIN: ICD-10-CM

## 2023-10-18 DIAGNOSIS — R07.9 CHEST PAIN, UNSPECIFIED TYPE: Primary | ICD-10-CM

## 2023-10-18 DIAGNOSIS — J18.9 PNEUMONIA OF LEFT LOWER LOBE DUE TO INFECTIOUS ORGANISM: ICD-10-CM

## 2023-10-18 LAB
ANION GAP SERPL CALCULATED.3IONS-SCNC: 14 MMOL/L (ref 9–17)
BASOPHILS # BLD: 0.07 K/UL (ref 0–0.2)
BASOPHILS NFR BLD: 1 % (ref 0–2)
BNP SERPL-MCNC: 90 PG/ML
BUN SERPL-MCNC: 13 MG/DL (ref 6–20)
BUN/CREAT SERPL: 11 (ref 9–20)
CALCIUM SERPL-MCNC: 9.1 MG/DL (ref 8.6–10.4)
CHLORIDE SERPL-SCNC: 102 MMOL/L (ref 98–107)
CO2 SERPL-SCNC: 21 MMOL/L (ref 20–31)
CREAT SERPL-MCNC: 1.2 MG/DL (ref 0.7–1.2)
EOSINOPHIL # BLD: 0.31 K/UL (ref 0–0.44)
EOSINOPHILS RELATIVE PERCENT: 4 % (ref 1–4)
ERYTHROCYTE [DISTWIDTH] IN BLOOD BY AUTOMATED COUNT: 13.6 % (ref 11.8–14.4)
GFR SERPL CREATININE-BSD FRML MDRD: >60 ML/MIN/1.73M2
GLUCOSE SERPL-MCNC: 125 MG/DL (ref 70–99)
HCT VFR BLD AUTO: 47.5 % (ref 40.7–50.3)
HGB BLD-MCNC: 15.4 G/DL (ref 13–17)
IMM GRANULOCYTES # BLD AUTO: <0.03 K/UL (ref 0–0.3)
IMM GRANULOCYTES NFR BLD: 0 %
LYMPHOCYTES NFR BLD: 2.79 K/UL (ref 1.1–3.7)
LYMPHOCYTES RELATIVE PERCENT: 36 % (ref 24–43)
MAGNESIUM SERPL-MCNC: 2.2 MG/DL (ref 1.6–2.6)
MCH RBC QN AUTO: 28.8 PG (ref 25.2–33.5)
MCHC RBC AUTO-ENTMCNC: 32.4 G/DL (ref 28.4–34.8)
MCV RBC AUTO: 89 FL (ref 82.6–102.9)
MONOCYTES NFR BLD: 0.76 K/UL (ref 0.1–1.2)
MONOCYTES NFR BLD: 10 % (ref 3–12)
NEUTROPHILS NFR BLD: 49 % (ref 36–65)
NEUTS SEG NFR BLD: 3.89 K/UL (ref 1.5–8.1)
NRBC BLD-RTO: 0 PER 100 WBC
PLATELET # BLD AUTO: 236 K/UL (ref 138–453)
PMV BLD AUTO: 10 FL (ref 8.1–13.5)
POTASSIUM SERPL-SCNC: 4.2 MMOL/L (ref 3.7–5.3)
RBC # BLD AUTO: 5.34 M/UL (ref 4.21–5.77)
SODIUM SERPL-SCNC: 137 MMOL/L (ref 135–144)
TROPONIN I SERPL HS-MCNC: 19 NG/L (ref 0–22)
TROPONIN I SERPL HS-MCNC: 24 NG/L (ref 0–22)
WBC OTHER # BLD: 7.8 K/UL (ref 3.5–11.3)

## 2023-10-18 PROCEDURE — 80048 BASIC METABOLIC PNL TOTAL CA: CPT

## 2023-10-18 PROCEDURE — 96367 TX/PROPH/DG ADDL SEQ IV INF: CPT

## 2023-10-18 PROCEDURE — 83735 ASSAY OF MAGNESIUM: CPT

## 2023-10-18 PROCEDURE — 6370000000 HC RX 637 (ALT 250 FOR IP)

## 2023-10-18 PROCEDURE — 83880 ASSAY OF NATRIURETIC PEPTIDE: CPT

## 2023-10-18 PROCEDURE — G0378 HOSPITAL OBSERVATION PER HR: HCPCS

## 2023-10-18 PROCEDURE — 2580000003 HC RX 258: Performed by: STUDENT IN AN ORGANIZED HEALTH CARE EDUCATION/TRAINING PROGRAM

## 2023-10-18 PROCEDURE — 36415 COLL VENOUS BLD VENIPUNCTURE: CPT

## 2023-10-18 PROCEDURE — 84145 PROCALCITONIN (PCT): CPT

## 2023-10-18 PROCEDURE — 93005 ELECTROCARDIOGRAM TRACING: CPT | Performed by: STUDENT IN AN ORGANIZED HEALTH CARE EDUCATION/TRAINING PROGRAM

## 2023-10-18 PROCEDURE — 84484 ASSAY OF TROPONIN QUANT: CPT

## 2023-10-18 PROCEDURE — 96365 THER/PROPH/DIAG IV INF INIT: CPT

## 2023-10-18 PROCEDURE — 6360000002 HC RX W HCPCS

## 2023-10-18 PROCEDURE — 99285 EMERGENCY DEPT VISIT HI MDM: CPT

## 2023-10-18 PROCEDURE — 2500000003 HC RX 250 WO HCPCS

## 2023-10-18 PROCEDURE — 94761 N-INVAS EAR/PLS OXIMETRY MLT: CPT

## 2023-10-18 PROCEDURE — 6360000002 HC RX W HCPCS: Performed by: STUDENT IN AN ORGANIZED HEALTH CARE EDUCATION/TRAINING PROGRAM

## 2023-10-18 PROCEDURE — 85025 COMPLETE CBC W/AUTO DIFF WBC: CPT

## 2023-10-18 PROCEDURE — 71045 X-RAY EXAM CHEST 1 VIEW: CPT

## 2023-10-18 RX ORDER — ONDANSETRON 4 MG/1
4 TABLET, ORALLY DISINTEGRATING ORAL EVERY 8 HOURS PRN
Status: DISCONTINUED | OUTPATIENT
Start: 2023-10-18 | End: 2023-10-20 | Stop reason: HOSPADM

## 2023-10-18 RX ORDER — SODIUM CHLORIDE 0.9 % (FLUSH) 0.9 %
5-40 SYRINGE (ML) INJECTION PRN
Status: DISCONTINUED | OUTPATIENT
Start: 2023-10-18 | End: 2023-10-20 | Stop reason: HOSPADM

## 2023-10-18 RX ORDER — ACETAMINOPHEN 325 MG/1
650 TABLET ORAL EVERY 6 HOURS PRN
Status: DISCONTINUED | OUTPATIENT
Start: 2023-10-18 | End: 2023-10-20 | Stop reason: HOSPADM

## 2023-10-18 RX ORDER — SODIUM CHLORIDE 0.9 % (FLUSH) 0.9 %
5-40 SYRINGE (ML) INJECTION EVERY 12 HOURS SCHEDULED
Status: DISCONTINUED | OUTPATIENT
Start: 2023-10-18 | End: 2023-10-20 | Stop reason: HOSPADM

## 2023-10-18 RX ORDER — MIDODRINE HYDROCHLORIDE 5 MG/1
10 TABLET ORAL
Status: DISCONTINUED | OUTPATIENT
Start: 2023-10-18 | End: 2023-10-19

## 2023-10-18 RX ORDER — ATORVASTATIN CALCIUM 10 MG/1
10 TABLET, FILM COATED ORAL DAILY
Status: DISCONTINUED | OUTPATIENT
Start: 2023-10-19 | End: 2023-10-19

## 2023-10-18 RX ORDER — PANTOPRAZOLE SODIUM 40 MG/1
40 TABLET, DELAYED RELEASE ORAL
Status: DISCONTINUED | OUTPATIENT
Start: 2023-10-19 | End: 2023-10-20 | Stop reason: HOSPADM

## 2023-10-18 RX ORDER — ENOXAPARIN SODIUM 100 MG/ML
30 INJECTION SUBCUTANEOUS 2 TIMES DAILY
Status: DISCONTINUED | OUTPATIENT
Start: 2023-10-18 | End: 2023-10-20 | Stop reason: HOSPADM

## 2023-10-18 RX ORDER — LISINOPRIL 5 MG/1
5 TABLET ORAL DAILY
Status: DISCONTINUED | OUTPATIENT
Start: 2023-10-19 | End: 2023-10-20 | Stop reason: HOSPADM

## 2023-10-18 RX ORDER — METOPROLOL SUCCINATE 25 MG/1
25 TABLET, EXTENDED RELEASE ORAL 2 TIMES DAILY
Status: DISCONTINUED | OUTPATIENT
Start: 2023-10-18 | End: 2023-10-20 | Stop reason: HOSPADM

## 2023-10-18 RX ORDER — ALBUTEROL SULFATE 2.5 MG/3ML
2.5 SOLUTION RESPIRATORY (INHALATION) EVERY 6 HOURS PRN
Status: DISCONTINUED | OUTPATIENT
Start: 2023-10-18 | End: 2023-10-19

## 2023-10-18 RX ORDER — POLYETHYLENE GLYCOL 3350 17 G/17G
17 POWDER, FOR SOLUTION ORAL DAILY PRN
Status: DISCONTINUED | OUTPATIENT
Start: 2023-10-18 | End: 2023-10-20 | Stop reason: HOSPADM

## 2023-10-18 RX ORDER — TAMSULOSIN HYDROCHLORIDE 0.4 MG/1
0.4 CAPSULE ORAL DAILY
Status: DISCONTINUED | OUTPATIENT
Start: 2023-10-19 | End: 2023-10-20 | Stop reason: HOSPADM

## 2023-10-18 RX ORDER — SODIUM CHLORIDE 9 MG/ML
INJECTION, SOLUTION INTRAVENOUS PRN
Status: DISCONTINUED | OUTPATIENT
Start: 2023-10-18 | End: 2023-10-20 | Stop reason: HOSPADM

## 2023-10-18 RX ORDER — ACETAMINOPHEN 650 MG/1
650 SUPPOSITORY RECTAL EVERY 6 HOURS PRN
Status: DISCONTINUED | OUTPATIENT
Start: 2023-10-18 | End: 2023-10-20 | Stop reason: HOSPADM

## 2023-10-18 RX ORDER — ASPIRIN 81 MG/1
81 TABLET, CHEWABLE ORAL DAILY
Status: DISCONTINUED | OUTPATIENT
Start: 2023-10-19 | End: 2023-10-20 | Stop reason: HOSPADM

## 2023-10-18 RX ORDER — ONDANSETRON 2 MG/ML
4 INJECTION INTRAMUSCULAR; INTRAVENOUS EVERY 6 HOURS PRN
Status: DISCONTINUED | OUTPATIENT
Start: 2023-10-18 | End: 2023-10-20 | Stop reason: HOSPADM

## 2023-10-18 RX ADMIN — AZITHROMYCIN MONOHYDRATE 500 MG: 500 INJECTION, POWDER, LYOPHILIZED, FOR SOLUTION INTRAVENOUS at 22:47

## 2023-10-18 RX ADMIN — CEFTRIAXONE 1000 MG: 1 INJECTION, POWDER, FOR SOLUTION INTRAMUSCULAR; INTRAVENOUS at 22:16

## 2023-10-18 RX ADMIN — ONDANSETRON 4 MG: 4 TABLET, ORALLY DISINTEGRATING ORAL at 23:30

## 2023-10-18 ASSESSMENT — ENCOUNTER SYMPTOMS
COLOR CHANGE: 0
FACIAL SWELLING: 0
ABDOMINAL PAIN: 0
VOMITING: 0
EYE PAIN: 0
CHEST TIGHTNESS: 0
EYE DISCHARGE: 0
NAUSEA: 0
SINUS PRESSURE: 0
SINUS PAIN: 0
BACK PAIN: 0
CHOKING: 1
TROUBLE SWALLOWING: 0
COUGH: 0
SHORTNESS OF BREATH: 1

## 2023-10-18 ASSESSMENT — PATIENT HEALTH QUESTIONNAIRE - PHQ9
SUM OF ALL RESPONSES TO PHQ QUESTIONS 1-9: 0
SUM OF ALL RESPONSES TO PHQ9 QUESTIONS 1 & 2: 0
1. LITTLE INTEREST OR PLEASURE IN DOING THINGS: 0
2. FEELING DOWN, DEPRESSED OR HOPELESS: 0
SUM OF ALL RESPONSES TO PHQ QUESTIONS 1-9: 0

## 2023-10-18 ASSESSMENT — PAIN DESCRIPTION - ORIENTATION: ORIENTATION: MID

## 2023-10-18 ASSESSMENT — PAIN DESCRIPTION - PAIN TYPE: TYPE: ACUTE PAIN

## 2023-10-18 ASSESSMENT — LIFESTYLE VARIABLES
HOW MANY STANDARD DRINKS CONTAINING ALCOHOL DO YOU HAVE ON A TYPICAL DAY: PATIENT DOES NOT DRINK
HOW OFTEN DO YOU HAVE A DRINK CONTAINING ALCOHOL: NEVER

## 2023-10-18 ASSESSMENT — PAIN DESCRIPTION - DESCRIPTORS
DESCRIPTORS: SHARP
DESCRIPTORS: DULL

## 2023-10-18 ASSESSMENT — PAIN SCALES - GENERAL
PAINLEVEL_OUTOF10: 2
PAINLEVEL_OUTOF10: 2

## 2023-10-18 ASSESSMENT — PAIN DESCRIPTION - LOCATION
LOCATION: CHEST
LOCATION: HEAD

## 2023-10-18 ASSESSMENT — PAIN DESCRIPTION - FREQUENCY: FREQUENCY: INTERMITTENT

## 2023-10-18 ASSESSMENT — PAIN - FUNCTIONAL ASSESSMENT: PAIN_FUNCTIONAL_ASSESSMENT: 0-10

## 2023-10-19 ENCOUNTER — APPOINTMENT (OUTPATIENT)
Dept: CT IMAGING | Age: 53
End: 2023-10-19
Payer: COMMERCIAL

## 2023-10-19 PROBLEM — J18.9 COMMUNITY ACQUIRED PNEUMONIA OF LEFT LOWER LOBE OF LUNG: Status: ACTIVE | Noted: 2023-10-19

## 2023-10-19 LAB
ANION GAP SERPL CALCULATED.3IONS-SCNC: 11 MMOL/L (ref 9–17)
B PARAP IS1001 DNA NPH QL NAA+NON-PROBE: NOT DETECTED
B PERT DNA SPEC QL NAA+PROBE: NOT DETECTED
BUN SERPL-MCNC: 13 MG/DL (ref 6–20)
BUN/CREAT SERPL: 13 (ref 9–20)
C PNEUM DNA NPH QL NAA+NON-PROBE: NOT DETECTED
CALCIUM SERPL-MCNC: 8.9 MG/DL (ref 8.6–10.4)
CHLORIDE SERPL-SCNC: 106 MMOL/L (ref 98–107)
CHOLEST SERPL-MCNC: 112 MG/DL
CHOLESTEROL/HDL RATIO: 3.3
CO2 SERPL-SCNC: 24 MMOL/L (ref 20–31)
CREAT SERPL-MCNC: 1 MG/DL (ref 0.7–1.2)
ECHO BSA: 2.35 M2
ERYTHROCYTE [DISTWIDTH] IN BLOOD BY AUTOMATED COUNT: 13.9 % (ref 11.8–14.4)
FLUAV RNA NPH QL NAA+NON-PROBE: NOT DETECTED
FLUBV RNA NPH QL NAA+NON-PROBE: NOT DETECTED
GFR SERPL CREATININE-BSD FRML MDRD: >60 ML/MIN/1.73M2
GLUCOSE SERPL-MCNC: 121 MG/DL (ref 70–99)
HADV DNA NPH QL NAA+NON-PROBE: NOT DETECTED
HCOV 229E RNA NPH QL NAA+NON-PROBE: NOT DETECTED
HCOV HKU1 RNA NPH QL NAA+NON-PROBE: NOT DETECTED
HCOV NL63 RNA NPH QL NAA+NON-PROBE: NOT DETECTED
HCOV OC43 RNA NPH QL NAA+NON-PROBE: NOT DETECTED
HCT VFR BLD AUTO: 46.7 % (ref 40.7–50.3)
HDLC SERPL-MCNC: 34 MG/DL
HGB BLD-MCNC: 15.3 G/DL (ref 13–17)
HMPV RNA NPH QL NAA+NON-PROBE: NOT DETECTED
HPIV1 RNA NPH QL NAA+NON-PROBE: NOT DETECTED
HPIV2 RNA NPH QL NAA+NON-PROBE: NOT DETECTED
HPIV3 RNA NPH QL NAA+NON-PROBE: NOT DETECTED
HPIV4 RNA NPH QL NAA+NON-PROBE: NOT DETECTED
LDLC SERPL CALC-MCNC: 36 MG/DL (ref 0–130)
M PNEUMO DNA NPH QL NAA+NON-PROBE: NOT DETECTED
MCH RBC QN AUTO: 29.2 PG (ref 25.2–33.5)
MCHC RBC AUTO-ENTMCNC: 32.8 G/DL (ref 28.4–34.8)
MCV RBC AUTO: 89.1 FL (ref 82.6–102.9)
NRBC BLD-RTO: 0 PER 100 WBC
PLATELET # BLD AUTO: 215 K/UL (ref 138–453)
PMV BLD AUTO: 10.1 FL (ref 8.1–13.5)
POTASSIUM SERPL-SCNC: 4.8 MMOL/L (ref 3.7–5.3)
PROCALCITONIN SERPL-MCNC: 0.06 NG/ML
RBC # BLD AUTO: 5.24 M/UL (ref 4.21–5.77)
RSV RNA NPH QL NAA+NON-PROBE: NOT DETECTED
RV+EV RNA NPH QL NAA+NON-PROBE: NOT DETECTED
SARS-COV-2 RNA NPH QL NAA+NON-PROBE: NOT DETECTED
SODIUM SERPL-SCNC: 141 MMOL/L (ref 135–144)
SPECIMEN DESCRIPTION: NORMAL
TRIGL SERPL-MCNC: 211 MG/DL
TROPONIN I SERPL HS-MCNC: 26 NG/L (ref 0–22)
WBC OTHER # BLD: 6.2 K/UL (ref 3.5–11.3)

## 2023-10-19 PROCEDURE — G0378 HOSPITAL OBSERVATION PER HR: HCPCS

## 2023-10-19 PROCEDURE — 85027 COMPLETE CBC AUTOMATED: CPT

## 2023-10-19 PROCEDURE — 71250 CT THORAX DX C-: CPT

## 2023-10-19 PROCEDURE — 94640 AIRWAY INHALATION TREATMENT: CPT

## 2023-10-19 PROCEDURE — 36415 COLL VENOUS BLD VENIPUNCTURE: CPT

## 2023-10-19 PROCEDURE — 6370000000 HC RX 637 (ALT 250 FOR IP): Performed by: INTERNAL MEDICINE

## 2023-10-19 PROCEDURE — 6370000000 HC RX 637 (ALT 250 FOR IP)

## 2023-10-19 PROCEDURE — 94761 N-INVAS EAR/PLS OXIMETRY MLT: CPT

## 2023-10-19 PROCEDURE — 84484 ASSAY OF TROPONIN QUANT: CPT

## 2023-10-19 PROCEDURE — 7100000010 HC PHASE II RECOVERY - FIRST 15 MIN: Performed by: FAMILY MEDICINE

## 2023-10-19 PROCEDURE — 6360000002 HC RX W HCPCS: Performed by: FAMILY MEDICINE

## 2023-10-19 PROCEDURE — 80061 LIPID PANEL: CPT

## 2023-10-19 PROCEDURE — 99152 MOD SED SAME PHYS/QHP 5/>YRS: CPT | Performed by: FAMILY MEDICINE

## 2023-10-19 PROCEDURE — 99153 MOD SED SAME PHYS/QHP EA: CPT | Performed by: FAMILY MEDICINE

## 2023-10-19 PROCEDURE — C1894 INTRO/SHEATH, NON-LASER: HCPCS | Performed by: FAMILY MEDICINE

## 2023-10-19 PROCEDURE — 2500000003 HC RX 250 WO HCPCS: Performed by: FAMILY MEDICINE

## 2023-10-19 PROCEDURE — C1769 GUIDE WIRE: HCPCS | Performed by: FAMILY MEDICINE

## 2023-10-19 PROCEDURE — 2580000003 HC RX 258

## 2023-10-19 PROCEDURE — 99222 1ST HOSP IP/OBS MODERATE 55: CPT | Performed by: INTERNAL MEDICINE

## 2023-10-19 PROCEDURE — 7100000011 HC PHASE II RECOVERY - ADDTL 15 MIN: Performed by: FAMILY MEDICINE

## 2023-10-19 PROCEDURE — 94664 DEMO&/EVAL PT USE INHALER: CPT

## 2023-10-19 PROCEDURE — 96372 THER/PROPH/DIAG INJ SC/IM: CPT

## 2023-10-19 PROCEDURE — 0202U NFCT DS 22 TRGT SARS-COV-2: CPT

## 2023-10-19 PROCEDURE — 6360000002 HC RX W HCPCS

## 2023-10-19 PROCEDURE — 6360000004 HC RX CONTRAST MEDICATION: Performed by: FAMILY MEDICINE

## 2023-10-19 PROCEDURE — 2709999900 HC NON-CHARGEABLE SUPPLY: Performed by: FAMILY MEDICINE

## 2023-10-19 PROCEDURE — 93458 L HRT ARTERY/VENTRICLE ANGIO: CPT | Performed by: FAMILY MEDICINE

## 2023-10-19 PROCEDURE — 87040 BLOOD CULTURE FOR BACTERIA: CPT

## 2023-10-19 PROCEDURE — 2580000003 HC RX 258: Performed by: FAMILY MEDICINE

## 2023-10-19 PROCEDURE — 80048 BASIC METABOLIC PNL TOTAL CA: CPT

## 2023-10-19 RX ORDER — AZITHROMYCIN 250 MG/1
500 TABLET, FILM COATED ORAL DAILY
Status: DISCONTINUED | OUTPATIENT
Start: 2023-10-19 | End: 2023-10-19

## 2023-10-19 RX ORDER — VERAPAMIL HYDROCHLORIDE 2.5 MG/ML
INJECTION, SOLUTION INTRAVENOUS PRN
Status: DISCONTINUED | OUTPATIENT
Start: 2023-10-19 | End: 2023-10-19 | Stop reason: HOSPADM

## 2023-10-19 RX ORDER — AZITHROMYCIN 250 MG/1
500 TABLET, FILM COATED ORAL DAILY
Status: CANCELLED | OUTPATIENT
Start: 2023-10-19

## 2023-10-19 RX ORDER — HEPARIN SODIUM 1000 [USP'U]/ML
INJECTION, SOLUTION INTRAVENOUS; SUBCUTANEOUS PRN
Status: DISCONTINUED | OUTPATIENT
Start: 2023-10-19 | End: 2023-10-19 | Stop reason: HOSPADM

## 2023-10-19 RX ORDER — IPRATROPIUM BROMIDE AND ALBUTEROL SULFATE 2.5; .5 MG/3ML; MG/3ML
1 SOLUTION RESPIRATORY (INHALATION) EVERY 4 HOURS PRN
Status: DISCONTINUED | OUTPATIENT
Start: 2023-10-19 | End: 2023-10-19

## 2023-10-19 RX ORDER — IPRATROPIUM BROMIDE AND ALBUTEROL SULFATE 2.5; .5 MG/3ML; MG/3ML
1 SOLUTION RESPIRATORY (INHALATION) 3 TIMES DAILY
Status: DISCONTINUED | OUTPATIENT
Start: 2023-10-19 | End: 2023-10-20 | Stop reason: HOSPADM

## 2023-10-19 RX ORDER — LIDOCAINE HYDROCHLORIDE 10 MG/ML
INJECTION, SOLUTION INFILTRATION; PERINEURAL PRN
Status: DISCONTINUED | OUTPATIENT
Start: 2023-10-19 | End: 2023-10-19 | Stop reason: HOSPADM

## 2023-10-19 RX ORDER — ALBUTEROL SULFATE 2.5 MG/3ML
2.5 SOLUTION RESPIRATORY (INHALATION) EVERY 4 HOURS PRN
Status: DISCONTINUED | OUTPATIENT
Start: 2023-10-19 | End: 2023-10-20 | Stop reason: HOSPADM

## 2023-10-19 RX ORDER — MIDODRINE HYDROCHLORIDE 5 MG/1
10 TABLET ORAL 2 TIMES DAILY WITH MEALS
Status: DISCONTINUED | OUTPATIENT
Start: 2023-10-19 | End: 2023-10-20 | Stop reason: HOSPADM

## 2023-10-19 RX ORDER — NITROGLYCERIN 20 MG/100ML
INJECTION INTRAVENOUS PRN
Status: DISCONTINUED | OUTPATIENT
Start: 2023-10-19 | End: 2023-10-19 | Stop reason: HOSPADM

## 2023-10-19 RX ORDER — MIDAZOLAM HYDROCHLORIDE 1 MG/ML
INJECTION INTRAMUSCULAR; INTRAVENOUS PRN
Status: DISCONTINUED | OUTPATIENT
Start: 2023-10-19 | End: 2023-10-19 | Stop reason: HOSPADM

## 2023-10-19 RX ORDER — 0.9 % SODIUM CHLORIDE 0.9 %
INTRAVENOUS SOLUTION INTRAVENOUS CONTINUOUS PRN
Status: COMPLETED | OUTPATIENT
Start: 2023-10-19 | End: 2023-10-19

## 2023-10-19 RX ADMIN — SODIUM CHLORIDE, PRESERVATIVE FREE 10 ML: 5 INJECTION INTRAVENOUS at 07:56

## 2023-10-19 RX ADMIN — MIDODRINE HYDROCHLORIDE 10 MG: 5 TABLET ORAL at 00:22

## 2023-10-19 RX ADMIN — SODIUM CHLORIDE, PRESERVATIVE FREE 10 ML: 5 INJECTION INTRAVENOUS at 00:25

## 2023-10-19 RX ADMIN — MIDODRINE HYDROCHLORIDE 10 MG: 5 TABLET ORAL at 07:56

## 2023-10-19 RX ADMIN — METOPROLOL SUCCINATE 25 MG: 25 TABLET, EXTENDED RELEASE ORAL at 20:25

## 2023-10-19 RX ADMIN — PANTOPRAZOLE SODIUM 40 MG: 40 TABLET, DELAYED RELEASE ORAL at 07:00

## 2023-10-19 RX ADMIN — METOPROLOL SUCCINATE 25 MG: 25 TABLET, EXTENDED RELEASE ORAL at 07:56

## 2023-10-19 RX ADMIN — ENOXAPARIN SODIUM 30 MG: 100 INJECTION SUBCUTANEOUS at 00:22

## 2023-10-19 RX ADMIN — IPRATROPIUM BROMIDE AND ALBUTEROL SULFATE 1 DOSE: .5; 3 SOLUTION RESPIRATORY (INHALATION) at 11:04

## 2023-10-19 RX ADMIN — IPRATROPIUM BROMIDE AND ALBUTEROL SULFATE 1 DOSE: .5; 3 SOLUTION RESPIRATORY (INHALATION) at 19:48

## 2023-10-19 RX ADMIN — LISINOPRIL 5 MG: 5 TABLET ORAL at 07:56

## 2023-10-19 RX ADMIN — IPRATROPIUM BROMIDE AND ALBUTEROL SULFATE 1 DOSE: .5; 3 SOLUTION RESPIRATORY (INHALATION) at 16:15

## 2023-10-19 RX ADMIN — MOMETASONE FUROATE AND FORMOTEROL FUMARATE DIHYDRATE 2 PUFF: 200; 5 AEROSOL RESPIRATORY (INHALATION) at 11:06

## 2023-10-19 RX ADMIN — IPRATROPIUM BROMIDE AND ALBUTEROL SULFATE 1 DOSE: 2.5; .5 SOLUTION RESPIRATORY (INHALATION) at 05:56

## 2023-10-19 RX ADMIN — SODIUM CHLORIDE, PRESERVATIVE FREE 10 ML: 5 INJECTION INTRAVENOUS at 20:26

## 2023-10-19 RX ADMIN — MIDODRINE HYDROCHLORIDE 10 MG: 5 TABLET ORAL at 20:25

## 2023-10-19 RX ADMIN — METOPROLOL SUCCINATE 25 MG: 25 TABLET, EXTENDED RELEASE ORAL at 00:22

## 2023-10-19 RX ADMIN — ASPIRIN 81 MG: 81 TABLET, CHEWABLE ORAL at 07:55

## 2023-10-19 RX ADMIN — TAMSULOSIN HYDROCHLORIDE 0.4 MG: 0.4 CAPSULE ORAL at 07:55

## 2023-10-19 RX ADMIN — MOMETASONE FUROATE AND FORMOTEROL FUMARATE DIHYDRATE 2 PUFF: 200; 5 AEROSOL RESPIRATORY (INHALATION) at 19:48

## 2023-10-19 RX ADMIN — ENOXAPARIN SODIUM 30 MG: 100 INJECTION SUBCUTANEOUS at 07:54

## 2023-10-19 RX ADMIN — ACETAMINOPHEN 650 MG: 325 TABLET ORAL at 21:34

## 2023-10-19 RX ADMIN — ENOXAPARIN SODIUM 30 MG: 100 INJECTION SUBCUTANEOUS at 20:24

## 2023-10-19 ASSESSMENT — PAIN DESCRIPTION - DESCRIPTORS
DESCRIPTORS: DULL
DESCRIPTORS: ACHING

## 2023-10-19 ASSESSMENT — PAIN DESCRIPTION - LOCATION
LOCATION: HEAD
LOCATION: ARM

## 2023-10-19 ASSESSMENT — PAIN DESCRIPTION - ORIENTATION
ORIENTATION: RIGHT
ORIENTATION: MID

## 2023-10-19 ASSESSMENT — PAIN - FUNCTIONAL ASSESSMENT: PAIN_FUNCTIONAL_ASSESSMENT: ACTIVITIES ARE NOT PREVENTED

## 2023-10-19 ASSESSMENT — PAIN DESCRIPTION - FREQUENCY
FREQUENCY: INTERMITTENT
FREQUENCY: INTERMITTENT

## 2023-10-19 ASSESSMENT — PAIN SCALES - GENERAL
PAINLEVEL_OUTOF10: 4
PAINLEVEL_OUTOF10: 2
PAINLEVEL_OUTOF10: 2

## 2023-10-19 ASSESSMENT — PAIN SCALES - WONG BAKER: WONGBAKER_NUMERICALRESPONSE: 0

## 2023-10-19 ASSESSMENT — PAIN DESCRIPTION - PAIN TYPE
TYPE: ACUTE PAIN
TYPE: ACUTE PAIN

## 2023-10-19 NOTE — PROGRESS NOTES
RESPIRATORY ASSESSMENT PROTOCOL                                                                                              Patient Name: Jourdan Roberto Room#: [id number] : 1970     Admitting diagnosis: Chest pain in adult [R07.9]  Pneumonia of left lower lobe due to infectious organism [J18.9]  Chest pain, unspecified type [R07.9]       Medical History:   Past Medical History:   Diagnosis Date    Anxiety     Asthma     Brain lesion     on MRI , stable on MRI  - thought to be due to sequela of migraine or mild small vessel ischemic disease    Depression     Essential and other specified forms of tremor     Fatty liver disease, nonalcoholic     GERD (gastroesophageal reflux disease)     H/O echocardiogram 2016    EF 55%. Mild LV hypertrophy. Mild diastolic dysfunction is seen. Herniated disc     History of echocardiogram 2018    EF 60%. LV wall thickness is mildly increased. Mild pulmonic regurg. History of Holter monitoring 2016    sinus rhythm with sinus tachycardia 27% of the study duration,average HR 90 bpm ranging between 51 - 147 bpm. Rare isolated PAC's and PVC's    History of stress test 2016    Relatively normal.  EF 63%. Moise Treadmill score is 7, Low risk for CAD    Hx of tilt table evaluation 08/15/2016    Abnormal.  Heart rate,blood pressure response and symptoms were most consistent with dysautonomia.     Hyperlipidemia     Hypertension     Impaired fasting glucose     Kidney stone     Obstructive sleep apnea (adult) (pediatric)     non-compliant with CPAP    Sinus tachycardia        PATIENT ASSESSMENT    LABORATORY DATA  Hematology:   Lab Results   Component Value Date/Time    WBC 6.2 10/19/2023 05:55 AM    RBC 5.24 10/19/2023 05:55 AM    RBC 5.34 2021 11:30 AM    HGB 15.3 10/19/2023 05:55 AM    HCT 46.7 10/19/2023 05:55 AM     10/19/2023 05:55 AM     2011 08:57 PM     Chemistry:    Lab Results   Component Value Date/Time

## 2023-10-19 NOTE — DISCHARGE INSTRUCTIONS
Discharge Instructions for Cardiac Catheterization    A cardiac catheterization is a diagnostic test used to evaluate the health of the heart and its blood vessels. The test is done with a thin catheter carefully threaded into your heart from a leg or arm artery. Most likely, you will be allowed to go home the same day as the procedure. Steps to Take at Home:   Pain- apply ice to site 15-20 minutes every hour for the first 2 days. Showering is okay 24 hours after procedure. No soaking in a pool, hot tub, bath tub, or standing water for one week. Bleeding (outward or under the skin-hematoma)- apply firm pressure for 10-15 minutes or until the bleeding stops, then call your doctor. If unable to get bleeding stopped, call 911. Kidney damage- Call if you urinate less than normal, have swelling or feel puffy, and/or gain 2 or more pounds over night in the first week. If procedure was in ARM:  You were instructed to keep wrist straight and still for two hours after the procedure. The arm and hand may now be used for normal daily activities except, avoid using the heal of hand while getting up and down from furniture for the first few days. Keep affected arm elevated, hand higher than elbow, while pressure dressing in place to decrease swelling. 1)  Gauze and Elastoplast   Remove in 4 hours as follows:     TIME:____11:45_PM__________  Remove 1 piece of tape at a time, waiting 15 -20 minutes between layers to monitor for bleeding. If dressing sticks, place wrist under cool running water to help loosen gauze from site then pat site dry. *** If hand feels numb, tingly, and/or cold- loosen first 1-2 layers of tape if dressing still in place. If no relief noticed, remove pressure dressing as per above instructions. Seek medical help if no relief or if dressing already off. Diet   Drink plenty of fluids after the test to flush the x-ray dye from your system. Return to your normal diet.    No alcoholic

## 2023-10-19 NOTE — ED PROVIDER NOTES
08/27/2020    Dr. Alana Rueda [facility]     CHOLECYSTECTOMY  08/2011    COLONOSCOPY  2017    Dr. Marko Lam in [place] - negative     COLONOSCOPY  2021    Sada Earthly  2007    Dr. Nenita Laura Left 2017    Dr. Matti Robertson in [facility]  08/2011    Dr. Matti Robertson in [address] [place] [address]       Current Discharge Medication List        CONTINUE these medications which have NOT CHANGED    Details   budesonide-formoterol (SYMBICORT) 160-4.5 MCG/ACT AERO INHALE 2 PUFFS BY MOUTH 2 TIMES A DAY. Qty: 10.2 g, Refills: 5      omeprazole (PRILOSEC) 40 MG delayed release capsule Take 1 capsule by mouth daily  Qty: 90 capsule, Refills: 3      metoprolol succinate (TOPROL XL) 25 MG extended release tablet Take 1 tablet by mouth 2 times daily  Qty: 60 tablet, Refills: 5      tamsulosin (FLOMAX) 0.4 MG capsule Take 1 capsule by mouth daily  Qty: 90 capsule, Refills: 3      albuterol sulfate HFA (PROVENTIL;VENTOLIN;PROAIR) 108 (90 Base) MCG/ACT inhaler INHALE 1 OR 2 PUFFS BY MOUTH EVERY 4-6 HOURS AS NEEDED FOR SHORTNESS OF BREATH/WHEEZING  Qty: 18 g, Refills: 1      midodrine (PROAMATINE) 10 MG tablet Take 1 tablet by mouth in the morning and 1 tablet in the evening.   Qty: 180 tablet, Refills: 3      pitavastatin (LIVALO) 1 MG TABS tablet Take 1 tablet by mouth nightly  Qty: 180 tablet, Refills: 3      aspirin 81 MG chewable tablet Take 1 tablet by mouth daily             ALLERGIES     Mobic [meloxicam] and Statins    FAMILY HISTORY       Family History   Problem Relation Age of Onset    Depression Mother     Colon Cancer Mother 64    Cancer Mother     COPD Father     Heart Disease Father     Lung Cancer Father 78    Other Father         AAA     Heart Defect Sister         septal defect as a baby    Stroke Maternal Grandmother     Other Paternal Grandmother         tremors    Heart Disease Paternal Grandfather         SOCIAL HISTORY polyethylene glycol (GLYCOLAX) packet 17 g (has no administration in time range)   lisinopril (PRINIVIL;ZESTRIL) tablet 5 mg (has no administration in time range)   enoxaparin Sodium (LOVENOX) injection 30 mg (30 mg SubCUTAneous Given 10/19/23 0022)   ipratropium 0.5 mg-albuterol 2.5 mg (DUONEB) nebulizer solution 1 Dose (has no administration in time range)   cefTRIAXone (ROCEPHIN) 1,000 mg in sodium chloride 0.9 % 50 mL IVPB ([id number]) (has no administration in time range)   azithromycin (ZITHROMAX) tablet 500 mg (has no administration in time range)   cefTRIAXone (ROCEPHIN) 1,000 mg in sodium chloride 0.9 % 50 mL IVPB ([id number]) (0 mg IntraVENous Stopped 10/18/23 2247)   azithromycin (ZITHROMAX) 500 mg in 250 mL addavial (0 mg IntraVENous Stopped 10/19/23 0020)       [facility]  . ED Course as of 10/19/23 0113   Wed Oct 18, 2023   2101 EKG with sinus rhythm with a rate of 86. Patient with no ST elevations or depressions concerning for STEMI. EKG with left axis deviation. Intervals within normal limits. ECG unchanged from 03/12/2023. EKG interpreted by myself. [PK]      ED Course User Index  [PK] Jarad Jerry MD       Patient presenting for evaluation for chest pain for the last 2 weeks intermittently with cough and fatigue. Presentation is concerning for pneumonia versus asthma exacerbation versus a cardiac etiology although less likely. Work-up in the department with no leukocytosis, no anemia, with a chest x-ray concerning for infiltrative process in the left lower lung fields. Radiology overread with no acute findings. Patient's EKG as noted above. Troponin was 24 with a lactate of 19, BNP was within normal limits, patient with no electrolyte abnormalities or renal function impairment. Discussed lab and imaging results with the patient. Discussed with the patient given symptoms would recommend antibiotics for possible pneumonia.   Discussed with the also recommended patient to hospital for

## 2023-10-19 NOTE — PROGRESS NOTES
Patient hand off completed. Head to toe, vitals, and daily cares completed. Patient denies needs, call light within reach, care ongoing.

## 2023-10-19 NOTE — CONSULTS
Subjective:     CHIEF COMPLAINT / HPI:   Chief Complaint   Patient presents with    Chest Pain     Patient reports chest pain that started about 2 weeks ago. Patient reports fatigue also and has a history of sinus tachycardia. Dear Dr Lakshmi Elias is 45 y.o. male with multiple medical problems as outlined below who initially presented for evaluation of chest pain and dizziness. History of negative stress test on 1/6/2016. Patient had CTA of aorta to rule out aortic aneurysm and found to have dense calcification of the left main coronary artery leading to cardiac catheterization which revealed mild CAD without any focal stenosis. No prior history of myocardial infarction or heart failure. History of anxiety/panic attacks. History of sleep apnea, not using CPAP. History of dyslipidemia and statin intolerance. Finally tolerating Livalo 1 mg daily, started in July 2017. Tilt done in 2016, was abnormal, Heart rate,blood pressure response and symptoms were most consistent with dysautonomia. Holter done in 2016, sinus rhythm with sinus tachycardia 27% of the study duration,average HR 90 bpm ranging between 51 - 147 bpm. Rare isolated PAC's and PVC's    ECG done on (11/11/2018)- Normal sinus rhythm with sinus arrhythmia. Normal ECG. When compared with ECG of 06-OCT-2017 16:13, no significant change was found    EKG done in office 5/17/2019 that showed sinus rhythm with no acute ischemic changes. Stress test done 5/17/2019- EF 61% Normal myocardial perfusion imaging without evidence of significant myocardial ischemia or infarction. He went 8 minutes and 20 seconds. ECG done on 11/11/2020 at [facility] showed normal sinus rhythm and was a normal ECG    EKG done in office 7/20/2022- Normal sinus rhythm. 84 bpm    Mr. Vy Leal is admitted to the hospital because of worsening shortness of breath and chest pain. He also reported having intermittent palpitations.   He is extremely [address], [address]  Phone: [phone], Fax: [phone]    I believe that the risk of significant morbidity and mortality related to the patient's current medical conditions are: intermediate-high. >60 minutes were spent during prep work, discussion and exam of the patient, and follow up documentation and all of their questions were answered. The documentation recorded by the scribe, accurately and completely reflects the services I personally performed and the decisions made by me. Kaykay Armijo MD, F.A.C.C.  October 19, 2023

## 2023-10-19 NOTE — PROGRESS NOTES
Pharmacist Review and Automatic Dose Adjustment of Prophylactic Enoxaparin    Reviewed reason(s) for admission/hospital problem list    The reviewing pharmacist has made an adjustment to the ordered enoxaparin dose or converted to UFH per the approved [facility] protocol and table as identified below. Jose Bull is a 45 y.o. male. Recent Labs     10/18/23  2035   CREATININE 1.2       Estimated Creatinine Clearance: 88 mL/min (based on SCr of 1.2 mg/dL). Recent Labs     10/18/23  2035   HGB 15.4   HCT 47.5        No results for input(s): \"INR\" in the last 72 hours.     Height:   Ht Readings from Last 1 Encounters:   10/18/23 1.778 m (5' 10\")     Weight:  Wt Readings from Last 1 Encounters:   10/18/23 112.1 kg (247 lb 1.6 oz)               Plan: Based upon the patient's weight and renal function    Ordered: Enoxaparin 40mg SUBQ Daily    Changed/converted to    New Order: Enoxaparin 30mg SUBQ BID      Thank you,  Judith Apodaca, [address] [place] [address]  10/18/2023, 11:30 PM

## 2023-10-19 NOTE — PROGRESS NOTES
Writer called [facility] nurse, let her know we will be up around 1845/1900

## 2023-10-19 NOTE — PROGRESS NOTES
Admission vitals and assessment completed at this time, see flowsheet for more details. Pt resting awake in bed, complains of nausea that just started when he arrived to his room. Zofran given. Denies any SOB or pain at this time. All needs met at this time, call light within reach. Care ongoing.

## 2023-10-19 NOTE — PROGRESS NOTES
Patient called out requesting to get his temperature taken stating he thinks he may have a fever. Patients temp is 97.7 F.

## 2023-10-19 NOTE — PROGRESS NOTES
Nutrition Assessment     Type and Reason for Visit: Initial    Nutrition Recommendations/Plan:   Continue current diet. Malnutrition Assessment:  Malnutrition Status: Insufficient data    Nutrition Assessment:  Altered nutrition related labs r/t cardiac/endocrine dysfunction aeb , A1c 6.4/. Overweight/obesity r/t excess energy intakes aeb BMI 35.41. COVID rule out, Pt is under droplet precautions. Phone call to room unanswered. Nutrition Related Findings:   unable to assess due to droplet precautions for covid rule out Wound Type: None    Current Nutrition Therapies:    ADULT DIET; Regular; No Caffeine    Anthropometric Measures:  Height: 177.8 cm (5' 10\")  Current Body Wt: 111.9 kg (246 lb 12.8 oz)   BMI: 35.4  Hematology:  Recent Labs     10/18/23  2035 10/19/23  0555   WBC 7.8 6.2   HGB 15.4 15.3   HCT 47.5 46.7     Chemistry:  Recent Labs     10/18/23  2035 10/19/23  0555    141   K 4.2 4.8    106   CO2 21 24   GLUCOSE 125* 121*   BUN 13 13   CREATININE 1.2 1.0   MG 2.2  --    CALCIUM 9.1 8.9     No results for input(s): \"PROT\", \"LABALBU\", \"LABA1C\", \"I3AFFBY\", \"L6UMSCF\", \"FT4\", \"TSH\", \"AST\", \"ALT\", \"LDH\", \"GGT\", \"ALKPHOS\", \"BILITOT\", \"AMMONIA\", \"AMYLASE\", \"LIPASE\", \"LACTATE\", \"CHOL\", \"TRIG\", \"HDL\", \"LDLCALC\", \"LDLDIRECT\", \"LABVLDL\", \"BNP\", \"TROPONINI\", \"CKTOTAL\" in the last 72 hours.     Lab Results   Component Value Date/Time    TRIG 208 08/12/2023 10:47 AM    HDL 37 08/12/2023 10:47 AM    LDLCALC 41 07/27/2023 12:00 AM    LABVLDL 52 07/31/2021 11:30 AM      Lab Results   Component Value Date/Time    LABA1C 6.4 08/12/2023 10:48 AM      Nutrition Diagnosis:   Altered nutrition-related lab values related to cardiac dysfunction, endocrine dysfuntion as evidenced by lab values    Nutrition Interventions:   Food and/or Nutrient Delivery: Continue Current Diet  Nutrition Education/Counseling: No recommendation at this time  Coordination of Nutrition Care: Continue to monitor while

## 2023-10-19 NOTE — H&P
History and Physical    Patient:  Maura Mccullough  MRN: [id number]    Chief Complaint: Chest pain    History Obtained From:  patient, electronic medical record    PCP: Mirna Brunner, MD    History of Present Illness: The patient is a 45 y.o. male who presents with pain. Patient states he had intermittent chest pain for the last 2 weeks. Pain is left-sided and sharp. He denies any aggravating or alleviating factors. He states he has also had pain in his right hand intermittently. He states the pain becomes severe and he starts to drop things. This only lasts a few minutes. He has had an increased nonproductive cough. He states he has been using his Symbicort inhaler without relief of symptoms. He does get short of breath and has wheezing with exertion. He is also complaining of increased fatigue. He states he could sleep 12 hours and wake up still feeling tired. He denies any fever or chills. Patient states he has also been feeling his heart rate speed up and slow down occasionally. Appears anxious at times. Past medical history includes asthma, GERD, anxiety, depression, sinus tachycardia, diastolic dysfunction. Initial troponin 24 and 19. Past Medical History:        Diagnosis Date    Anxiety     Asthma     Brain lesion     on MRI 2011, stable on MRI 2016 - thought to be due to sequela of migraine or mild small vessel ischemic disease    Depression     Essential and other specified forms of tremor     Fatty liver disease, nonalcoholic     GERD (gastroesophageal reflux disease)     H/O echocardiogram 01/05/2016    EF 55%. Mild LV hypertrophy. Mild diastolic dysfunction is seen. Herniated disc     History of echocardiogram 12/12/2018    EF 60%. LV wall thickness is mildly increased. Mild pulmonic regurg.      History of Holter monitoring 09/20/2016    sinus rhythm with sinus tachycardia 27% of the study duration,average HR 90 bpm ranging between 51 - 147 bpm. Rare isolated PAC's and PVC's Data:   Test interpretation:  My independent EKG interpretation: normal sinus rhythm  My independent X-ray interpretation:  CXR shows infiltrate left lower lobe  Management and/or test interpretation discussed with ER MD at time of admission  Consults and Nursing notes were personally reviewed, all current labs and imaging were personally reviewed, tests ordered: CBC, BMP, and history obtained by independent historian       Disposition:  Shared decision making: All test results, treatment options and disposition options were discussed with the patient today  Social determinants of health that may impact management: none  Code status: Full Code   Disposition: Discharge plan is home        Critical Care Time:  Total critical care time caring for this patient with life threatening, unstable organ failure, including direct patient contact, management of life support systems, review of data including imaging and labs, discussions with other team members and physicians at least 0 minutes so far today, excluding separately billable procedures. [facility] Medication Reconciliation documentation:    [x] I have utilized all available immediate resources to obtain, update, or review the patient's current medications (including all prescriptions, over-the-counter products, herbals, cannabinoid products and bitamin/mineral/dietary/nutritional supplements. Cyndy.Riser 'yes\", Chloe Madrigal     []  The patient is not eligible for medication reconciliation; the patient is in an emergent medical situation where delaying treatment would jeopardize the patient's health    []  I did NOT confirm, update or review the patient's current list of medications today.   [DOES NOT SATISFY [facility] PERFORMANCE]        [facility] Advanced Care Planning documentation:  [x] I have confirmed that the patient's Advance Care Plan is present, Code Status is documented, or surrogate decision maker is listed in the patient's medical record  [If \"yes\", STOP HERE]     [] The

## 2023-10-20 VITALS
HEART RATE: 80 BPM | TEMPERATURE: 97.7 F | OXYGEN SATURATION: 95 % | RESPIRATION RATE: 18 BRPM | DIASTOLIC BLOOD PRESSURE: 77 MMHG | WEIGHT: 245.9 LBS | SYSTOLIC BLOOD PRESSURE: 146 MMHG | HEIGHT: 70 IN | BODY MASS INDEX: 35.2 KG/M2

## 2023-10-20 LAB
ANION GAP SERPL CALCULATED.3IONS-SCNC: 9 MMOL/L (ref 9–17)
BUN SERPL-MCNC: 12 MG/DL (ref 6–20)
BUN/CREAT SERPL: 13 (ref 9–20)
CALCIUM SERPL-MCNC: 9 MG/DL (ref 8.6–10.4)
CHLORIDE SERPL-SCNC: 108 MMOL/L (ref 98–107)
CO2 SERPL-SCNC: 26 MMOL/L (ref 20–31)
CREAT SERPL-MCNC: 0.9 MG/DL (ref 0.7–1.2)
GFR SERPL CREATININE-BSD FRML MDRD: >60 ML/MIN/1.73M2
GLUCOSE SERPL-MCNC: 131 MG/DL (ref 70–99)
POTASSIUM SERPL-SCNC: 4.8 MMOL/L (ref 3.7–5.3)
SODIUM SERPL-SCNC: 143 MMOL/L (ref 135–144)

## 2023-10-20 PROCEDURE — 94640 AIRWAY INHALATION TREATMENT: CPT

## 2023-10-20 PROCEDURE — G0378 HOSPITAL OBSERVATION PER HR: HCPCS

## 2023-10-20 PROCEDURE — 6370000000 HC RX 637 (ALT 250 FOR IP): Performed by: FAMILY MEDICINE

## 2023-10-20 PROCEDURE — 6360000002 HC RX W HCPCS: Performed by: FAMILY MEDICINE

## 2023-10-20 PROCEDURE — 80048 BASIC METABOLIC PNL TOTAL CA: CPT

## 2023-10-20 PROCEDURE — 96372 THER/PROPH/DIAG INJ SC/IM: CPT

## 2023-10-20 PROCEDURE — 36415 COLL VENOUS BLD VENIPUNCTURE: CPT

## 2023-10-20 PROCEDURE — 94761 N-INVAS EAR/PLS OXIMETRY MLT: CPT

## 2023-10-20 RX ADMIN — IPRATROPIUM BROMIDE AND ALBUTEROL SULFATE 1 DOSE: .5; 3 SOLUTION RESPIRATORY (INHALATION) at 08:42

## 2023-10-20 RX ADMIN — ENOXAPARIN SODIUM 30 MG: 100 INJECTION SUBCUTANEOUS at 09:14

## 2023-10-20 RX ADMIN — TAMSULOSIN HYDROCHLORIDE 0.4 MG: 0.4 CAPSULE ORAL at 09:14

## 2023-10-20 RX ADMIN — ASPIRIN 81 MG: 81 TABLET, CHEWABLE ORAL at 09:14

## 2023-10-20 RX ADMIN — METOPROLOL SUCCINATE 25 MG: 25 TABLET, EXTENDED RELEASE ORAL at 09:14

## 2023-10-20 RX ADMIN — LISINOPRIL 5 MG: 5 TABLET ORAL at 09:14

## 2023-10-20 RX ADMIN — PANTOPRAZOLE SODIUM 40 MG: 40 TABLET, DELAYED RELEASE ORAL at 07:31

## 2023-10-20 RX ADMIN — MOMETASONE FUROATE AND FORMOTEROL FUMARATE DIHYDRATE 2 PUFF: 200; 5 AEROSOL RESPIRATORY (INHALATION) at 08:42

## 2023-10-20 RX ADMIN — MIDODRINE HYDROCHLORIDE 10 MG: 5 TABLET ORAL at 07:31

## 2023-10-20 ASSESSMENT — PAIN - FUNCTIONAL ASSESSMENT: PAIN_FUNCTIONAL_ASSESSMENT: ACTIVITIES ARE NOT PREVENTED

## 2023-10-20 ASSESSMENT — PAIN SCALES - GENERAL: PAINLEVEL_OUTOF10: 2

## 2023-10-20 NOTE — PLAN OF CARE
Problem: Discharge Planning  Goal: Discharge to home or other facility with appropriate resources  Outcome: Adequate for Discharge     Problem: Pain  Goal: Verbalizes/displays adequate comfort level or baseline comfort level  Outcome: Adequate for Discharge     Problem: Safety - Adult  Goal: Free from fall injury  Outcome: Adequate for Discharge Yes

## 2023-10-20 NOTE — PROGRESS NOTES
Pt returned from cath lab, accompanied by wife. Pt currently sitting up in bed. VS and assessment as charted. Pt is A&Ox4. Denies any pain. Elastoplast remains in place over site. Pt aware of plan of care. Snacks provided. Denies any other needs at this time. Call light remains within reach, will continue to monitor.

## 2023-10-20 NOTE — DISCHARGE SUMMARY
Jackie Gamino M.D. Internal Medicine Discharge Summary    Patient ID:  Mohini Umanzor  [id number]  1970    Admission date: 10/18/2023    Discharge date: 10/20/2023     Admitting Physician: No att. providers found     Primary Care Physician: Giovani Velasco MD     Primary Discharge Diagnoses:   Patient Active Problem List    Diagnosis Date Noted    Community acquired pneumonia of left lower lobe of lung 10/19/2023    Chest pain in adult 10/18/2023    Chest pain, non-cardiac 01/05/2016    Unstable angina pectoris ([address]) 01/04/2016    Sinus tachycardia seen on cardiac monitor 01/04/2016    Impaired fasting glucose     Esophageal reflux     Obstructive sleep apnea     Memory loss 07/02/2014    Demyelinating disease of central nervous system ([address]) 03/18/2014    Headache 03/18/2014    Abnormal involuntary movement 05/07/2013    Hyperlipidemia 02/20/2013    Essential and other specified forms of tremor 10/31/2012    Affective disorder ([address]) 10/31/2012       Additional Diagnoses:       Diagnosis Date    Anxiety     Asthma     Brain lesion     on MRI 2011, stable on MRI 2016 - thought to be due to sequela of migraine or mild small vessel ischemic disease    Depression     Essential and other specified forms of tremor     Fatty liver disease, nonalcoholic     GERD (gastroesophageal reflux disease)     H/O echocardiogram 01/05/2016    EF 55%. Mild LV hypertrophy. Mild diastolic dysfunction is seen. Herniated disc     History of echocardiogram 12/12/2018    EF 60%. LV wall thickness is mildly increased. Mild pulmonic regurg. History of Holter monitoring 09/20/2016    sinus rhythm with sinus tachycardia 27% of the study duration,average HR 90 bpm ranging between 51 - 147 bpm. Rare isolated PAC's and PVC's    History of stress test 01/05/2016    Relatively normal.  EF 63%.  Moise Treadmill score is 7, Low risk for CAD    Hx of tilt table evaluation 08/15/2016    Abnormal.  Heart rate,blood pressure response and CREATININE 1.2 1.0 0.9    141 143   K 4.2 4.8 4.8    106 108*   MG 2.2  --   --    CALCIUM 9.1 8.9 9.0   ANIONGAP 14 11 9   CO2 21 24 26        Lab Results   Component Value Date    COLORU Yellow 04/27/2023    CLARITYU Clear 04/27/2023    SPECGRAV 1.030 04/27/2023    WBCUA 0 TO 2 12/01/2022    RBCUA 0 TO 2 12/01/2022    EPITHUA 0 TO 2 12/01/2022    LEUKOCYTESUR Negative 04/27/2023    GLUCOSEU Negative 04/27/2023    BLOODU Negative 04/27/2023    KETUA Negative 04/27/2023    PROTEINU 15 04/27/2023    HGBUR NEGATIVE 12/01/2022    CASTUA NOT REPORTED 11/07/2018    CRYSTUA NOT REPORTED 11/07/2018    BACTERIA 1+ (A) 12/01/2022    YEAST NOT REPORTED 11/07/2018       Lab Results   Component Value Date/Time    LACTA 1.5 11/21/2022 08:49 AM        Recent Labs     10/18/23  2035 10/18/23  2120 10/19/23  0555   TROPHS 24* 23 26*       Radiology/Imaging:  CT CHEST WO CONTRAST   Final Result   No acute cardiopulmonary disease. Three-vessel calcific coronary disease particularly involving the LAD. XR CHEST PORTABLE   Final Result   1. No acute abnormality. Discharge Condition:   stable    Disposition:   Discharge home    Discharge Medications:     Medication List        CONTINUE taking these medications      albuterol sulfate  (90 Base) MCG/ACT inhaler  Commonly known as: PROVENTIL;VENTOLIN;PROAIR  INHALE 1 OR 2 PUFFS BY MOUTH EVERY 4-6 HOURS AS NEEDED FOR SHORTNESS OF BREATH/WHEEZING     aspirin 81 MG chewable tablet     budesonide-formoterol 160-4.5 MCG/ACT Aero  Commonly known as: Symbicort  INHALE 2 PUFFS BY MOUTH 2 TIMES A DAY. Livalo 1 MG Tabs tablet  Generic drug: pitavastatin  Take 1 tablet by mouth nightly     metoprolol succinate 25 MG extended release tablet  Commonly known as: TOPROL XL  Take 1 tablet by mouth 2 times daily     midodrine 10 MG tablet  Commonly known as: PROAMATINE  Take 1 tablet by mouth in the morning and 1 tablet in the evening.      omeprazole 40 MG

## 2023-10-20 NOTE — PROGRESS NOTES
Patient IV and telemetry were removed. Patient tolerated well. Discharge instructions reviewed with the patient including medication changes in detail. Education regarding side effects were addressed along with appointments. All questions and concerns were answered. Unit number provided to the patient should they come up with any questions once they are home. Patient verbalizes understanding of instructions. All personal belonging are with patient. Patient was escorted out of the building to personal vehicle by nurse.

## 2023-10-20 NOTE — PROGRESS NOTES
Writer to bedside to complete morning assessment. Upon entry to room, pt resting in bed, respirations even while on room air. Vitals obtained and assessment completed, see flow sheet for details. Pt denies needs from writer at this time. Call light in reach. Care ongoing.

## 2023-10-21 LAB
EKG ATRIAL RATE: 70 BPM
EKG ATRIAL RATE: 86 BPM
EKG P AXIS: 22 DEGREES
EKG P AXIS: 51 DEGREES
EKG P-R INTERVAL: 146 MS
EKG P-R INTERVAL: 152 MS
EKG Q-T INTERVAL: 354 MS
EKG Q-T INTERVAL: 412 MS
EKG QRS DURATION: 78 MS
EKG QRS DURATION: 84 MS
EKG QTC CALCULATION (BAZETT): 423 MS
EKG QTC CALCULATION (BAZETT): 444 MS
EKG R AXIS: -23 DEGREES
EKG R AXIS: 1 DEGREES
EKG T AXIS: 29 DEGREES
EKG T AXIS: 53 DEGREES
EKG VENTRICULAR RATE: 70 BPM
EKG VENTRICULAR RATE: 86 BPM

## 2023-10-21 PROCEDURE — 93010 ELECTROCARDIOGRAM REPORT: CPT | Performed by: INTERNAL MEDICINE

## 2023-10-22 LAB
MICROORGANISM SPEC CULT: NORMAL
MICROORGANISM SPEC CULT: NORMAL
SERVICE CMNT-IMP: NORMAL
SERVICE CMNT-IMP: NORMAL
SPECIMEN DESCRIPTION: NORMAL
SPECIMEN DESCRIPTION: NORMAL

## 2023-12-26 RX ORDER — MIDODRINE HYDROCHLORIDE 10 MG/1
10 TABLET ORAL 2 TIMES DAILY
Qty: 180 TABLET | Refills: 0 | Status: SHIPPED | OUTPATIENT
Start: 2023-12-26

## 2024-01-22 RX ORDER — PITAVASTATIN CALCIUM 1.04 MG/1
1 TABLET, FILM COATED ORAL NIGHTLY
Qty: 90 TABLET | Refills: 0 | Status: SHIPPED | OUTPATIENT
Start: 2024-01-22

## 2024-02-20 ENCOUNTER — HOSPITAL ENCOUNTER (OUTPATIENT)
Age: 54
Discharge: HOME | End: 2024-02-20
Payer: COMMERCIAL

## 2024-02-20 DIAGNOSIS — E78.2 MIXED HYPERLIPIDEMIA: ICD-10-CM

## 2024-02-20 DIAGNOSIS — R73.01 IMPAIRED FASTING GLUCOSE: ICD-10-CM

## 2024-02-20 DIAGNOSIS — I10 ESSENTIAL HYPERTENSION, BENIGN: ICD-10-CM

## 2024-02-20 DIAGNOSIS — Z12.5 SCREENING PSA (PROSTATE SPECIFIC ANTIGEN): ICD-10-CM

## 2024-02-20 LAB
ALT SERPL-CCNC: 43 U/L (ref 5–41)
ANION GAP SERPL CALCULATED.3IONS-SCNC: 12 MMOL/L (ref 9–17)
AST SERPL-CCNC: 29 U/L
BUN SERPL-MCNC: 16 MG/DL (ref 6–20)
BUN/CREAT SERPL: 15 (ref 9–20)
CALCIUM SERPL-MCNC: 8.9 MG/DL (ref 8.6–10.4)
CHLORIDE SERPL-SCNC: 104 MMOL/L (ref 98–107)
CHOLEST SERPL-MCNC: 126 MG/DL (ref 0–199)
CHOLESTEROL/HDL RATIO: 3
CO2 SERPL-SCNC: 21 MMOL/L (ref 20–31)
CREAT SERPL-MCNC: 1.1 MG/DL (ref 0.7–1.2)
ERYTHROCYTE [DISTWIDTH] IN BLOOD BY AUTOMATED COUNT: 13.4 % (ref 11.8–14.4)
EST. AVERAGE GLUCOSE BLD GHB EST-MCNC: 131 MG/DL
GFR SERPL CREATININE-BSD FRML MDRD: >60 ML/MIN/1.73M2
GLUCOSE SERPL-MCNC: 122 MG/DL (ref 70–99)
HBA1C MFR BLD: 6.2 % (ref 4–6)
HCT VFR BLD AUTO: 48.9 % (ref 40.7–50.3)
HDLC SERPL-MCNC: 39 MG/DL
HGB BLD-MCNC: 16.4 G/DL (ref 13–17)
LDLC SERPL CALC-MCNC: 39 MG/DL (ref 0–100)
MCH RBC QN AUTO: 29.5 PG (ref 25.2–33.5)
MCHC RBC AUTO-ENTMCNC: 33.5 G/DL (ref 28.4–34.8)
MCV RBC AUTO: 88.1 FL (ref 82.6–102.9)
NRBC BLD-RTO: 0 PER 100 WBC
PLATELET # BLD AUTO: ABNORMAL K/UL (ref 138–453)
PLATELET, FLUORESCENCE: 137 K/UL (ref 138–453)
PLATELETS.RETICULATED NFR BLD AUTO: 7.9 % (ref 1.1–10.3)
POTASSIUM SERPL-SCNC: 4.7 MMOL/L (ref 3.7–5.3)
PSA SERPL-MCNC: 0.6 NG/ML (ref 0–4)
RBC # BLD AUTO: 5.55 M/UL (ref 4.21–5.77)
SODIUM SERPL-SCNC: 137 MMOL/L (ref 135–144)
TRIGL SERPL-MCNC: 239 MG/DL
VLDLC SERPL CALC-MCNC: 48 MG/DL
WBC OTHER # BLD: 7.4 K/UL (ref 3.5–11.3)

## 2024-02-20 PROCEDURE — 80048 BASIC METABOLIC PNL TOTAL CA: CPT

## 2024-02-20 PROCEDURE — 83036 HEMOGLOBIN GLYCOSYLATED A1C: CPT

## 2024-02-20 PROCEDURE — 85027 COMPLETE CBC AUTOMATED: CPT

## 2024-02-20 PROCEDURE — 36415 COLL VENOUS BLD VENIPUNCTURE: CPT

## 2024-02-20 PROCEDURE — 80061 LIPID PANEL: CPT

## 2024-02-20 PROCEDURE — G0103 PSA SCREENING: HCPCS

## 2024-02-20 PROCEDURE — 84460 ALANINE AMINO (ALT) (SGPT): CPT

## 2024-02-20 PROCEDURE — 84450 TRANSFERASE (AST) (SGOT): CPT

## 2024-03-20 ENCOUNTER — OFFICE VISIT (OUTPATIENT)
Dept: UROLOGY | Age: 54
End: 2024-03-20
Payer: COMMERCIAL

## 2024-03-20 ENCOUNTER — HOSPITAL ENCOUNTER (OUTPATIENT)
Age: 54
Setting detail: SPECIMEN
Discharge: HOME | End: 2024-03-20
Payer: COMMERCIAL

## 2024-03-20 VITALS
WEIGHT: 256 LBS | BODY MASS INDEX: 36.73 KG/M2 | HEART RATE: 80 BPM | DIASTOLIC BLOOD PRESSURE: 70 MMHG | SYSTOLIC BLOOD PRESSURE: 130 MMHG | TEMPERATURE: 98.6 F

## 2024-03-20 DIAGNOSIS — N40.1 BPH WITH OBSTRUCTION/LOWER URINARY TRACT SYMPTOMS: Primary | ICD-10-CM

## 2024-03-20 DIAGNOSIS — R35.1 NOCTURIA: ICD-10-CM

## 2024-03-20 DIAGNOSIS — G89.29 GROIN PAIN, CHRONIC, LEFT: ICD-10-CM

## 2024-03-20 DIAGNOSIS — R10.32 GROIN PAIN, CHRONIC, LEFT: ICD-10-CM

## 2024-03-20 DIAGNOSIS — N13.8 BPH WITH OBSTRUCTION/LOWER URINARY TRACT SYMPTOMS: Primary | ICD-10-CM

## 2024-03-20 PROCEDURE — 99214 OFFICE O/P EST MOD 30 MIN: CPT | Performed by: PHYSICIAN ASSISTANT

## 2024-03-20 PROCEDURE — 51798 US URINE CAPACITY MEASURE: CPT | Performed by: PHYSICIAN ASSISTANT

## 2024-03-20 PROCEDURE — 87086 URINE CULTURE/COLONY COUNT: CPT

## 2024-03-20 RX ORDER — TAMSULOSIN HYDROCHLORIDE 0.4 MG/1
0.4 CAPSULE ORAL 2 TIMES DAILY
Qty: 60 CAPSULE | Refills: 3 | Status: SHIPPED | OUTPATIENT
Start: 2024-03-20

## 2024-03-20 ASSESSMENT — ENCOUNTER SYMPTOMS
COLOR CHANGE: 0
VOMITING: 0
BACK PAIN: 0
WHEEZING: 0
CONSTIPATION: 0
SHORTNESS OF BREATH: 0
NAUSEA: 0
ABDOMINAL PAIN: 0
EYE REDNESS: 0

## 2024-03-20 NOTE — PATIENT INSTRUCTIONS
BLADDER IRRITANTS     There are several changes you can make to your diet to help improve your urinary symptoms.     The following have been shown to cause irritation to the bladder and should be AVOIDED if possible:  ~ Coffee (including decaffeinated)   ~ ALL Tea (including green teas and decaffeinated)  ~ Soda/Pop/carbonated beverages/Energy drinks (especially dark dyed teddy, root beers, mountain dew, etc)  ~These are MUCH worse if they have caffeine, but can also be irritative to the bladder even without caffeine  ~ Alcoholic beverages  ~ Spicy foods (peppers contain capsaicin, which is very irritating to the bladder)  ~ Acidic foods (for example: tomato based foods, orange juice, etc)    We encourage increased water intake, unless you have been placed on a fluid restriction by another provider.

## 2024-03-20 NOTE — PROGRESS NOTES
Bladderscan performed in office today:  Pt voided - 40 mL, PVR - 3 mL   
constipation, nausea and vomiting.   Genitourinary:  Negative for decreased urine volume, difficulty urinating, dysuria, enuresis, flank pain, frequency, hematuria, penile discharge, penile pain, scrotal swelling, testicular pain and urgency.        Positive for groin pain, nocturia, postvoid dribbling   Musculoskeletal:  Negative for back pain, joint swelling and myalgias.   Skin:  Negative for color change, rash and wound.   Neurological:  Negative for dizziness, tremors and numbness.   Hematological:  Negative for adenopathy. Does not bruise/bleed easily.       /70 (Site: Right Upper Arm, Position: Sitting, Cuff Size: Large Adult)   Pulse 80   Temp 98.6 °F (37 °C) (Infrared)   Wt 116.1 kg (256 lb)   BMI 36.73 kg/m²       PHYSICAL EXAM:  Constitutional: Patient in no acute distress;   Neuro: alert and oriented to person place and time.    Psych: Mood and affect normal.  Lungs: Respiratory effort normal  Abdomen: Soft, non-tender, non-distended  Rectal: Deferred      Lab Results   Component Value Date    BUN 16 02/20/2024     Lab Results   Component Value Date    CREATININE 1.1 02/20/2024     Lab Results   Component Value Date    PSA 0.60 02/20/2024    PSA 0.56 02/18/2023    PSA 0.82 07/22/2022       ASSESSMENT:   Diagnosis Orders   1. BPH with obstruction/lower urinary tract symptoms  TN FERMIN POST-VOIDING RESIDUAL URINE&/BLADDER CAP      2. Nocturia  Culture, Urine      3. Groin pain, chronic, left  External Referral To Physical Therapy            PLAN:  Urine culture, will call with results    Increase Flomax to twice a day    Patient does have sleep apnea.  This is untreated.  We do feel as though his sleep apnea is a contributing factor to him waking up at night.  We did discuss with him that he could have multiple issues going on including an obstructing prostate as well as his sleep apnea.  He may not be waking up slowly because he has to urinate.  He may be waking up secondary to his sleep apnea.

## 2024-03-22 ENCOUNTER — TELEPHONE (OUTPATIENT)
Dept: UROLOGY | Age: 54
End: 2024-03-22

## 2024-03-22 LAB
MICROORGANISM SPEC CULT: NORMAL
SPECIMEN DESCRIPTION: NORMAL

## 2024-03-22 NOTE — TELEPHONE ENCOUNTER
----- Message from SANJU Dubon - CNP sent at 3/22/2024  8:33 AM EDT -----  Call pt - urine cx reviewed and negative for UTI

## 2024-05-01 ENCOUNTER — HOSPITAL ENCOUNTER (OUTPATIENT)
Dept: GENERAL RADIOLOGY | Age: 54
Discharge: HOME | End: 2024-05-03
Payer: COMMERCIAL

## 2024-05-01 ENCOUNTER — HOSPITAL ENCOUNTER (OUTPATIENT)
Age: 54
Discharge: HOME | End: 2024-05-03
Payer: COMMERCIAL

## 2024-05-01 DIAGNOSIS — R52 PAIN: ICD-10-CM

## 2024-05-01 PROCEDURE — 73610 X-RAY EXAM OF ANKLE: CPT

## 2024-05-01 PROCEDURE — 73630 X-RAY EXAM OF FOOT: CPT

## 2024-05-09 ENCOUNTER — OFFICE VISIT (OUTPATIENT)
Dept: UROLOGY | Age: 54
End: 2024-05-09
Payer: COMMERCIAL

## 2024-05-09 VITALS
HEIGHT: 70 IN | TEMPERATURE: 97.7 F | WEIGHT: 255 LBS | BODY MASS INDEX: 36.51 KG/M2 | SYSTOLIC BLOOD PRESSURE: 124 MMHG | HEART RATE: 88 BPM | DIASTOLIC BLOOD PRESSURE: 78 MMHG

## 2024-05-09 DIAGNOSIS — N13.8 BPH WITH OBSTRUCTION/LOWER URINARY TRACT SYMPTOMS: Primary | ICD-10-CM

## 2024-05-09 DIAGNOSIS — N40.1 BPH WITH OBSTRUCTION/LOWER URINARY TRACT SYMPTOMS: Primary | ICD-10-CM

## 2024-05-09 DIAGNOSIS — R10.32 GROIN PAIN, CHRONIC, LEFT: ICD-10-CM

## 2024-05-09 DIAGNOSIS — G89.29 GROIN PAIN, CHRONIC, LEFT: ICD-10-CM

## 2024-05-09 DIAGNOSIS — R35.1 NOCTURIA: ICD-10-CM

## 2024-05-09 PROCEDURE — 99214 OFFICE O/P EST MOD 30 MIN: CPT | Performed by: UROLOGY

## 2024-05-09 NOTE — PATIENT INSTRUCTIONS
SURVEY:    You may be receiving a survey from [employer] regarding your visit today.    Please complete the survey to enable us to provide the highest quality of care to you and your family.    If you cannot score us a very good on any question, please call the office to discuss how we could have made your experience a very good one.    Thank you.

## 2024-05-09 NOTE — PROGRESS NOTES
HPI:          Patient is a 53 y.o. male in no acute distress.  He is alert and oriented to person, place, and time.         History  2017 left inguinal hernia repair     12/2022 Referral for left testicular pain. CT and scrotal US negative for  abnormalities              Doxycycline x3 weeks for epididymitis     2/2023 Follow-up for epididymitis.  He did complete a 3-week course of doxycycline.  He continues to have intermittent testicular pain, but he does feel that this is more in his suprapubic area.  He denies any dysuria or gross hematuria.  He does have a daily bowel movement.  He does have nocturia 2-4 times per night.  He does have postvoid dribbling.  He denies daytime frequency.  PVR is low. He consumes 1 can of root beer per day.              Started flomax                 Levaquin    Currently  Patient is here today for 6-day week follow-up.  Patient was started on Flomax twice daily.  We did also recommend that he goes to see physical therapy for his groin pain.  Patient did try physical therapy.  This did not help.  Patient did realize that the elastic on his underwear was causing what appears to be a meralgia paresthetica.  He has loosened his underwear and his pain is stopped.  He is happy with the Flomax.  He will try to split his dosing from in all a.m. dosing into an a.m. and p.m. dosing.  He is having no recent gross hematuria or dysuria.  He reports no pain today  Past Medical History:   Diagnosis Date    Anxiety     Asthma     Brain lesion     on MRI 2011, stable on MRI 2016 - thought to be due to sequela of migraine or mild small vessel ischemic disease    Depression     Essential and other specified forms of tremor     Fatty liver disease, nonalcoholic     GERD (gastroesophageal reflux disease)     H/O echocardiogram 01/05/2016    EF 55%. Mild LV hypertrophy. Mild diastolic dysfunction is seen.    Herniated disc     History of echocardiogram 12/12/2018    EF 60%. LV wall thickness is

## 2024-05-22 ENCOUNTER — TRANSCRIBE ORDERS (OUTPATIENT)
Dept: ADMINISTRATIVE | Age: 54
End: 2024-05-22

## 2024-05-22 DIAGNOSIS — M25.579 SINUS TARSITIS, UNSPECIFIED LATERALITY: ICD-10-CM

## 2024-05-22 DIAGNOSIS — M19.072 ARTHRITIS OF LEFT FOOT: Primary | ICD-10-CM

## 2024-05-29 ENCOUNTER — HOSPITAL ENCOUNTER (OUTPATIENT)
Age: 54
Discharge: HOME | End: 2024-05-29
Attending: PODIATRIST
Payer: COMMERCIAL

## 2024-05-29 ENCOUNTER — HOSPITAL ENCOUNTER (OUTPATIENT)
Dept: MRI IMAGING | Age: 54
Discharge: HOME | End: 2024-05-31
Attending: PODIATRIST
Payer: COMMERCIAL

## 2024-05-29 DIAGNOSIS — M25.579 SINUS TARSITIS, UNSPECIFIED LATERALITY: ICD-10-CM

## 2024-05-29 DIAGNOSIS — M19.072 ARTHRITIS OF LEFT FOOT: ICD-10-CM

## 2024-05-29 LAB
CREAT SERPL-MCNC: 1 MG/DL (ref 0.7–1.2)
GFR, ESTIMATED: 89 ML/MIN/1.73M2

## 2024-05-29 PROCEDURE — 73723 MRI JOINT LWR EXTR W/O&W/DYE: CPT

## 2024-05-29 PROCEDURE — A9579 GAD-BASE MR CONTRAST NOS,1ML: HCPCS | Performed by: PODIATRIST

## 2024-05-29 PROCEDURE — 6360000004 HC RX CONTRAST MEDICATION: Performed by: PODIATRIST

## 2024-05-29 PROCEDURE — 82565 ASSAY OF CREATININE: CPT

## 2024-05-29 RX ADMIN — GADOTERIDOL 20 ML: 279.3 INJECTION, SOLUTION INTRAVENOUS at 08:40

## 2024-06-26 RX ORDER — MIDODRINE HYDROCHLORIDE 10 MG/1
10 TABLET ORAL 2 TIMES DAILY
Qty: 180 TABLET | Refills: 3 | Status: SHIPPED | OUTPATIENT
Start: 2024-06-26

## 2024-07-18 ENCOUNTER — APPOINTMENT (OUTPATIENT)
Dept: CT IMAGING | Age: 54
End: 2024-07-18
Payer: COMMERCIAL

## 2024-07-18 ENCOUNTER — HOSPITAL ENCOUNTER (EMERGENCY)
Age: 54
Discharge: HOME | End: 2024-07-18
Payer: COMMERCIAL

## 2024-07-18 VITALS
OXYGEN SATURATION: 95 % | HEIGHT: 68 IN | BODY MASS INDEX: 38.09 KG/M2 | RESPIRATION RATE: 18 BRPM | DIASTOLIC BLOOD PRESSURE: 70 MMHG | TEMPERATURE: 98.7 F | WEIGHT: 251.32 LBS | HEART RATE: 90 BPM | SYSTOLIC BLOOD PRESSURE: 108 MMHG

## 2024-07-18 DIAGNOSIS — R19.7 DIARRHEA, UNSPECIFIED TYPE: ICD-10-CM

## 2024-07-18 DIAGNOSIS — R10.9 ABDOMINAL PAIN, UNSPECIFIED ABDOMINAL LOCATION: Primary | ICD-10-CM

## 2024-07-18 LAB
ALBUMIN SERPL-MCNC: 4.3 G/DL (ref 3.5–5.2)
ALBUMIN/GLOB SERPL: 1.4 {RATIO} (ref 1–2.5)
ALP SERPL-CCNC: 66 U/L (ref 40–129)
ALT SERPL-CCNC: 40 U/L (ref 5–41)
ANION GAP SERPL CALCULATED.3IONS-SCNC: 13 MMOL/L (ref 9–17)
AST SERPL-CCNC: 33 U/L
BASOPHILS # BLD: 0.04 K/UL (ref 0–0.2)
BASOPHILS NFR BLD: 1 % (ref 0–2)
BILIRUB SERPL-MCNC: 1.1 MG/DL (ref 0.3–1.2)
BUN SERPL-MCNC: 12 MG/DL (ref 6–20)
BUN/CREAT SERPL: 11 (ref 9–20)
CALCIUM SERPL-MCNC: 9.5 MG/DL (ref 8.6–10.4)
CHLORIDE SERPL-SCNC: 99 MMOL/L (ref 98–107)
CO2 SERPL-SCNC: 23 MMOL/L (ref 20–31)
CREAT SERPL-MCNC: 1.1 MG/DL (ref 0.7–1.2)
EOSINOPHIL # BLD: 0.07 K/UL (ref 0–0.44)
EOSINOPHILS RELATIVE PERCENT: 1 % (ref 1–4)
ERYTHROCYTE [DISTWIDTH] IN BLOOD BY AUTOMATED COUNT: 13.6 % (ref 11.8–14.4)
FLUAV AG SPEC QL: NEGATIVE
FLUBV AG SPEC QL: NEGATIVE
GFR, ESTIMATED: 79 ML/MIN/1.73M2
GLUCOSE SERPL-MCNC: 131 MG/DL (ref 70–99)
HCT VFR BLD AUTO: 48.9 % (ref 40.7–50.3)
HGB BLD-MCNC: 16.6 G/DL (ref 13–17)
IMM GRANULOCYTES # BLD AUTO: <0.03 K/UL (ref 0–0.3)
IMM GRANULOCYTES NFR BLD: 0 %
LIPASE SERPL-CCNC: 39 U/L (ref 13–60)
LYMPHOCYTES NFR BLD: 1.16 K/UL (ref 1.1–3.7)
LYMPHOCYTES RELATIVE PERCENT: 18 % (ref 24–43)
MCH RBC QN AUTO: 29.5 PG (ref 25.2–33.5)
MCHC RBC AUTO-ENTMCNC: 33.9 G/DL (ref 28.4–34.8)
MCV RBC AUTO: 87 FL (ref 82.6–102.9)
MONOCYTES NFR BLD: 0.84 K/UL (ref 0.1–1.2)
MONOCYTES NFR BLD: 13 % (ref 3–12)
NEUTROPHILS NFR BLD: 67 % (ref 36–65)
NEUTS SEG NFR BLD: 4.28 K/UL (ref 1.5–8.1)
NRBC BLD-RTO: 0 PER 100 WBC
PLATELET # BLD AUTO: 149 K/UL (ref 138–453)
PMV BLD AUTO: 10.8 FL (ref 8.1–13.5)
POTASSIUM SERPL-SCNC: 4.2 MMOL/L (ref 3.7–5.3)
PROT SERPL-MCNC: 7.3 G/DL (ref 6.4–8.3)
RBC # BLD AUTO: 5.62 M/UL (ref 4.21–5.77)
SARS-COV-2 RDRP RESP QL NAA+PROBE: NOT DETECTED
SODIUM SERPL-SCNC: 135 MMOL/L (ref 135–144)
SPECIMEN DESCRIPTION: NORMAL
WBC OTHER # BLD: 6.4 K/UL (ref 3.5–11.3)

## 2024-07-18 PROCEDURE — 80053 COMPREHEN METABOLIC PANEL: CPT

## 2024-07-18 PROCEDURE — 83690 ASSAY OF LIPASE: CPT

## 2024-07-18 PROCEDURE — 6360000002 HC RX W HCPCS: Performed by: EMERGENCY MEDICINE

## 2024-07-18 PROCEDURE — 87804 INFLUENZA ASSAY W/OPTIC: CPT

## 2024-07-18 PROCEDURE — 96374 THER/PROPH/DIAG INJ IV PUSH: CPT

## 2024-07-18 PROCEDURE — 99285 EMERGENCY DEPT VISIT HI MDM: CPT

## 2024-07-18 PROCEDURE — 74177 CT ABD & PELVIS W/CONTRAST: CPT

## 2024-07-18 PROCEDURE — 6360000004 HC RX CONTRAST MEDICATION: Performed by: PHYSICIAN ASSISTANT

## 2024-07-18 PROCEDURE — 2580000003 HC RX 258: Performed by: PHYSICIAN ASSISTANT

## 2024-07-18 PROCEDURE — 87635 SARS-COV-2 COVID-19 AMP PRB: CPT

## 2024-07-18 PROCEDURE — 6370000000 HC RX 637 (ALT 250 FOR IP): Performed by: EMERGENCY MEDICINE

## 2024-07-18 PROCEDURE — 85025 COMPLETE CBC W/AUTO DIFF WBC: CPT

## 2024-07-18 RX ORDER — 0.9 % SODIUM CHLORIDE 0.9 %
1000 INTRAVENOUS SOLUTION INTRAVENOUS ONCE
Status: COMPLETED | OUTPATIENT
Start: 2024-07-18 | End: 2024-07-18

## 2024-07-18 RX ORDER — ONDANSETRON 2 MG/ML
4 INJECTION INTRAMUSCULAR; INTRAVENOUS ONCE
Status: COMPLETED | OUTPATIENT
Start: 2024-07-18 | End: 2024-07-18

## 2024-07-18 RX ORDER — DIPHENOXYLATE HYDROCHLORIDE AND ATROPINE SULFATE 2.5; .025 MG/1; MG/1
1 TABLET ORAL 4 TIMES DAILY PRN
Qty: 15 TABLET | Refills: 0 | Status: SHIPPED | OUTPATIENT
Start: 2024-07-18 | End: 2024-07-21

## 2024-07-18 RX ORDER — ACETAMINOPHEN 500 MG
1000 TABLET ORAL ONCE
Status: COMPLETED | OUTPATIENT
Start: 2024-07-18 | End: 2024-07-18

## 2024-07-18 RX ORDER — SODIUM CHLORIDE 0.9 % (FLUSH) 0.9 %
3 SYRINGE (ML) INJECTION EVERY 8 HOURS
Status: DISCONTINUED | OUTPATIENT
Start: 2024-07-18 | End: 2024-07-19 | Stop reason: HOSPADM

## 2024-07-18 RX ADMIN — ONDANSETRON 4 MG: 2 INJECTION INTRAMUSCULAR; INTRAVENOUS at 22:43

## 2024-07-18 RX ADMIN — SODIUM CHLORIDE, PRESERVATIVE FREE 3 ML: 5 INJECTION INTRAVENOUS at 21:45

## 2024-07-18 RX ADMIN — IOPAMIDOL 75 ML: 755 INJECTION, SOLUTION INTRAVENOUS at 21:51

## 2024-07-18 RX ADMIN — ACETAMINOPHEN 1000 MG: 500 TABLET ORAL at 23:24

## 2024-07-18 RX ADMIN — SODIUM CHLORIDE 1000 ML: 9 INJECTION, SOLUTION INTRAVENOUS at 21:40

## 2024-07-18 ASSESSMENT — PAIN DESCRIPTION - DESCRIPTORS
DESCRIPTORS: CRAMPING
DESCRIPTORS: ACHING

## 2024-07-18 ASSESSMENT — PAIN DESCRIPTION - LOCATION
LOCATION: ABDOMEN
LOCATION: ABDOMEN

## 2024-07-18 ASSESSMENT — ENCOUNTER SYMPTOMS
DIARRHEA: 1
COUGH: 0
ABDOMINAL PAIN: 0
NAUSEA: 1
BLOOD IN STOOL: 1
VOMITING: 0
SHORTNESS OF BREATH: 0

## 2024-07-18 ASSESSMENT — PAIN DESCRIPTION - ORIENTATION
ORIENTATION: LOWER
ORIENTATION: LOWER

## 2024-07-18 ASSESSMENT — PAIN SCALES - GENERAL
PAINLEVEL_OUTOF10: 3
PAINLEVEL_OUTOF10: 4

## 2024-07-18 ASSESSMENT — PAIN - FUNCTIONAL ASSESSMENT
PAIN_FUNCTIONAL_ASSESSMENT: 0-10
PAIN_FUNCTIONAL_ASSESSMENT: NONE - DENIES PAIN

## 2024-07-19 NOTE — ED PROVIDER NOTES
MD  [address] [facility] [address]  [phone]    In 3 days        DISCHARGE MEDICATIONS:  Discharge Medication List as of 7/18/2024 10:36 PM        START taking these medications    Details   diphenoxylate-atropine (LOMOTIL) 2.5-0.025 MG per tablet Take 1 tablet by mouth 4 times daily as needed for Diarrhea for up to 3 days. Max Daily Amount: 4 tablets, Disp-15 tablet, R-0Print             (Please note that portions of this note were completed with a voice recognition program.  Efforts were made to edit the dictations but occasionally words are mis-transcribed.)    Rena Louis PA-C (electronically signed)  Attending Emergency Physician           Rena Louis PA-C  07/19/24 [id number]

## 2024-07-24 ENCOUNTER — HOSPITAL ENCOUNTER (OUTPATIENT)
Dept: WOMENS IMAGING | Age: 54
Discharge: HOME | End: 2024-07-26
Payer: COMMERCIAL

## 2024-07-24 DIAGNOSIS — R29.890 LOSS OF HEIGHT: ICD-10-CM

## 2024-07-24 DIAGNOSIS — T07.XXXA MULTIPLE FRACTURES: ICD-10-CM

## 2024-07-24 DIAGNOSIS — M19.90 ARTHRITIS: ICD-10-CM

## 2024-07-24 PROCEDURE — 77080 DXA BONE DENSITY AXIAL: CPT

## 2024-07-29 RX ORDER — TAMSULOSIN HYDROCHLORIDE 0.4 MG/1
0.4 CAPSULE ORAL 2 TIMES DAILY
Qty: 180 CAPSULE | Refills: 3 | Status: SHIPPED | OUTPATIENT
Start: 2024-07-29

## 2024-07-29 RX ORDER — PITAVASTATIN CALCIUM 1.04 MG/1
1 TABLET, FILM COATED ORAL NIGHTLY
Qty: 90 TABLET | Refills: 3 | Status: SHIPPED | OUTPATIENT
Start: 2024-07-29

## 2024-08-17 ENCOUNTER — HOSPITAL ENCOUNTER (OUTPATIENT)
Age: 54
Discharge: HOME | End: 2024-08-17
Payer: COMMERCIAL

## 2024-08-17 DIAGNOSIS — I10 ESSENTIAL HYPERTENSION, BENIGN: ICD-10-CM

## 2024-08-17 DIAGNOSIS — E78.2 MIXED HYPERLIPIDEMIA: ICD-10-CM

## 2024-08-17 DIAGNOSIS — R73.01 IMPAIRED FASTING GLUCOSE: ICD-10-CM

## 2024-08-17 LAB
ALT SERPL-CCNC: 49 U/L (ref 5–41)
ANION GAP SERPL CALCULATED.3IONS-SCNC: 14 MMOL/L (ref 9–17)
AST SERPL-CCNC: 34 U/L
BUN SERPL-MCNC: 12 MG/DL (ref 6–20)
BUN/CREAT SERPL: 11 (ref 9–20)
CALCIUM SERPL-MCNC: 9.3 MG/DL (ref 8.6–10.4)
CHLORIDE SERPL-SCNC: 102 MMOL/L (ref 98–107)
CHOLEST SERPL-MCNC: 122 MG/DL (ref 0–199)
CHOLESTEROL/HDL RATIO: 3
CO2 SERPL-SCNC: 23 MMOL/L (ref 20–31)
CREAT SERPL-MCNC: 1.1 MG/DL (ref 0.7–1.2)
ERYTHROCYTE [DISTWIDTH] IN BLOOD BY AUTOMATED COUNT: 13.5 % (ref 11.8–14.4)
GFR, ESTIMATED: 79 ML/MIN/1.73M2
GLUCOSE SERPL-MCNC: 140 MG/DL (ref 70–99)
HCT VFR BLD AUTO: 48.1 % (ref 40.7–50.3)
HDLC SERPL-MCNC: 36 MG/DL
HGB BLD-MCNC: 16.1 G/DL (ref 13–17)
LDLC SERPL CALC-MCNC: 32 MG/DL (ref 0–100)
MCH RBC QN AUTO: 28.9 PG (ref 25.2–33.5)
MCHC RBC AUTO-ENTMCNC: 33.5 G/DL (ref 28.4–34.8)
MCV RBC AUTO: 86.4 FL (ref 82.6–102.9)
NRBC BLD-RTO: 0 PER 100 WBC
PLATELET # BLD AUTO: 184 K/UL (ref 138–453)
PMV BLD AUTO: 10.7 FL (ref 8.1–13.5)
POTASSIUM SERPL-SCNC: 4.3 MMOL/L (ref 3.7–5.3)
RBC # BLD AUTO: 5.57 M/UL (ref 4.21–5.77)
SODIUM SERPL-SCNC: 139 MMOL/L (ref 135–144)
TRIGL SERPL-MCNC: 270 MG/DL
VLDLC SERPL CALC-MCNC: 54 MG/DL
WBC OTHER # BLD: 6.1 K/UL (ref 3.5–11.3)

## 2024-08-17 PROCEDURE — 80048 BASIC METABOLIC PNL TOTAL CA: CPT

## 2024-08-17 PROCEDURE — 80061 LIPID PANEL: CPT

## 2024-08-17 PROCEDURE — 84460 ALANINE AMINO (ALT) (SGPT): CPT

## 2024-08-17 PROCEDURE — 83036 HEMOGLOBIN GLYCOSYLATED A1C: CPT

## 2024-08-17 PROCEDURE — 84450 TRANSFERASE (AST) (SGOT): CPT

## 2024-08-17 PROCEDURE — 85027 COMPLETE CBC AUTOMATED: CPT

## 2024-08-17 PROCEDURE — 36415 COLL VENOUS BLD VENIPUNCTURE: CPT

## 2024-08-18 LAB
EST. AVERAGE GLUCOSE BLD GHB EST-MCNC: 134 MG/DL
HBA1C MFR BLD: 6.3 % (ref 4–6)

## 2024-08-21 ENCOUNTER — APPOINTMENT (OUTPATIENT)
Dept: GENERAL RADIOLOGY | Age: 54
End: 2024-08-21
Payer: COMMERCIAL

## 2024-08-21 ENCOUNTER — HOSPITAL ENCOUNTER (EMERGENCY)
Age: 54
Discharge: HOME | End: 2024-08-21
Payer: COMMERCIAL

## 2024-08-21 VITALS
RESPIRATION RATE: 20 BRPM | TEMPERATURE: 97.9 F | HEART RATE: 88 BPM | DIASTOLIC BLOOD PRESSURE: 83 MMHG | OXYGEN SATURATION: 95 % | SYSTOLIC BLOOD PRESSURE: 133 MMHG

## 2024-08-21 DIAGNOSIS — J45.21 MILD INTERMITTENT ASTHMA WITH ACUTE EXACERBATION: Primary | ICD-10-CM

## 2024-08-21 DIAGNOSIS — J98.01 ACUTE BRONCHOSPASM: ICD-10-CM

## 2024-08-21 PROCEDURE — 71046 X-RAY EXAM CHEST 2 VIEWS: CPT

## 2024-08-21 PROCEDURE — 6370000000 HC RX 637 (ALT 250 FOR IP): Performed by: NURSE PRACTITIONER

## 2024-08-21 PROCEDURE — 6360000002 HC RX W HCPCS: Performed by: NURSE PRACTITIONER

## 2024-08-21 PROCEDURE — 99283 EMERGENCY DEPT VISIT LOW MDM: CPT

## 2024-08-21 PROCEDURE — 94664 DEMO&/EVAL PT USE INHALER: CPT

## 2024-08-21 RX ORDER — IPRATROPIUM BROMIDE AND ALBUTEROL SULFATE 2.5; .5 MG/3ML; MG/3ML
1 SOLUTION RESPIRATORY (INHALATION) EVERY 4 HOURS
Qty: 360 ML | Refills: 0 | Status: SHIPPED | OUTPATIENT
Start: 2024-08-21

## 2024-08-21 RX ORDER — IPRATROPIUM BROMIDE AND ALBUTEROL SULFATE 2.5; .5 MG/3ML; MG/3ML
1 SOLUTION RESPIRATORY (INHALATION) ONCE
Status: COMPLETED | OUTPATIENT
Start: 2024-08-21 | End: 2024-08-21

## 2024-08-21 RX ORDER — DEXAMETHASONE SODIUM PHOSPHATE 10 MG/ML
6 INJECTION INTRAMUSCULAR; INTRAVENOUS ONCE
Status: COMPLETED | OUTPATIENT
Start: 2024-08-21 | End: 2024-08-21

## 2024-08-21 RX ORDER — NEBULIZER ACCESSORIES
1 KIT MISCELLANEOUS EVERY 6 HOURS
Qty: 1 KIT | Refills: 0 | Status: SHIPPED | OUTPATIENT
Start: 2024-08-21 | End: 2024-08-22

## 2024-08-21 RX ADMIN — DEXAMETHASONE SODIUM PHOSPHATE 6 MG: 10 INJECTION INTRAMUSCULAR; INTRAVENOUS at 21:19

## 2024-08-21 RX ADMIN — IPRATROPIUM BROMIDE AND ALBUTEROL SULFATE 1 DOSE: .5; 2.5 SOLUTION RESPIRATORY (INHALATION) at 20:27

## 2024-08-21 ASSESSMENT — ENCOUNTER SYMPTOMS
WHEEZING: 1
COUGH: 1

## 2024-08-22 RX ORDER — NEBULIZER ACCESSORIES
1 KIT MISCELLANEOUS EVERY 6 HOURS
Qty: 1 KIT | Refills: 0 | Status: SHIPPED | OUTPATIENT
Start: 2024-08-22 | End: 2024-09-21

## 2024-08-22 NOTE — DISCHARGE INSTRUCTIONS
Increase fluids  Continue home medications  Duoneb 1 unit/nebulizer every 6 hours    You received Decadron 6 mg po x 1 dose(Steroid). It may help with symptoms x 72 hours

## 2024-08-22 NOTE — ED PROVIDER NOTES
[facility] ED  eMERGENCY dEPARTMENT eNCOUnter   Independent Attestation     Pt Name: Kevin Weinstein  MRN: [id number]  Birthdate 1970  Date of evaluation: 8/21/24      I was personally available for consultation in the Emergency Department.       Electronically signed by Mayur Abarca DO on 8/21/24 at 9:55 PM EDT  Attending Emergency  Physician        Mayur Abarca DO  08/21/24 [id number]    
is mildly increased. Mild pulmonic regurg.     History of Holter monitoring 09/20/2016    sinus rhythm with sinus tachycardia 27% of the study duration,average HR 90 bpm ranging between 51 - 147 bpm. Rare isolated PAC's and PVC's    History of stress test 01/05/2016    Relatively normal.  EF 63%. Moise Treadmill score is 7, Low risk for CAD    Hx of tilt table evaluation 08/15/2016    Abnormal.  Heart rate,blood pressure response and symptoms were most consistent with dysautonomia.    Hyperlipidemia     Hypertension     Impaired fasting glucose     Kidney stone     Obstructive sleep apnea (adult) (pediatric)     non-compliant with CPAP    Sinus tachycardia          SURGICAL HISTORY       Past Surgical History:   Procedure Laterality Date    BLADDER STONE REMOVAL  1996    CARDIAC CATHETERIZATION Left 05/12/2017    via right radial approach/ [facility]/ Dr. Del Cid. LMCA: Normal 0%. stenosis. LAD: Mild irregularities 10-20%. LCx: Mild irregularities 20-30%. RCA: Normal 0% stenosis. small caliber vessel.  EF 55%.    CARDIAC CATHETERIZATION Left 10/19/2023    R radial / [facility] / Dr. Del Cid    CARDIAC PROCEDURE N/A 10/19/2023    Left heart cath / coronary angiography performed by Ramon Del Cid MD at [facility] CARDIAC CATH/IR LAB    CERVICAL FUSION  08/27/2020    Dr. Martinez - [facility]     CHOLECYSTECTOMY  08/2011    COLONOSCOPY  2017    Dr. Mary in [place] - negative     COLONOSCOPY  2021    [facility]    HEMORRHOID SURGERY  2007    Dr. Washburn    INGUINAL HERNIA REPAIR Left 2017    Dr. Martinez in [place]    TONSILLECTOMY  1990    UMBILICAL HERNIA REPAIR  08/2011    Dr. Martinez in [place]         CURRENT MEDICATIONS       Discharge Medication List as of 8/21/2024  9:09 PM        CONTINUE these medications which have NOT CHANGED    Details   alendronate (FOSAMAX) 70 MG tablet Take 1 tablet by mouth every 7 days, Disp-4 tablet, R-5Normal      omeprazole (PRILOSEC) 40 MG delayed release capsule TAKE 1

## 2024-09-03 ENCOUNTER — OFFICE VISIT (OUTPATIENT)
Dept: CARDIOLOGY | Age: 54
End: 2024-09-03
Payer: COMMERCIAL

## 2024-09-03 VITALS
DIASTOLIC BLOOD PRESSURE: 79 MMHG | WEIGHT: 255 LBS | RESPIRATION RATE: 18 BRPM | SYSTOLIC BLOOD PRESSURE: 119 MMHG | BODY MASS INDEX: 38.65 KG/M2 | OXYGEN SATURATION: 98 % | HEIGHT: 68 IN | HEART RATE: 92 BPM

## 2024-09-03 DIAGNOSIS — E78.2 MIXED HYPERLIPIDEMIA: ICD-10-CM

## 2024-09-03 DIAGNOSIS — I25.84 CORONARY ARTERY CALCIFICATION: ICD-10-CM

## 2024-09-03 DIAGNOSIS — Z82.49 FAMILY HISTORY OF ISCHEMIC HEART DISEASE: ICD-10-CM

## 2024-09-03 DIAGNOSIS — E66.9 CLASS 2 OBESITY WITH BODY MASS INDEX (BMI) OF 38.0 TO 38.9 IN ADULT, UNSPECIFIED OBESITY TYPE, UNSPECIFIED WHETHER SERIOUS COMORBIDITY PRESENT: ICD-10-CM

## 2024-09-03 DIAGNOSIS — I25.10 CORONARY ARTERY CALCIFICATION: ICD-10-CM

## 2024-09-03 DIAGNOSIS — R07.89 CHEST DISCOMFORT: Primary | ICD-10-CM

## 2024-09-03 DIAGNOSIS — G47.33 OSA (OBSTRUCTIVE SLEEP APNEA): ICD-10-CM

## 2024-09-03 DIAGNOSIS — I25.10 MILD CAD: ICD-10-CM

## 2024-09-03 PROCEDURE — 99214 OFFICE O/P EST MOD 30 MIN: CPT | Performed by: INTERNAL MEDICINE

## 2024-09-03 PROCEDURE — 93000 ELECTROCARDIOGRAM COMPLETE: CPT | Performed by: INTERNAL MEDICINE

## 2024-09-03 NOTE — PROGRESS NOTES
I, Thi León am scribing for and in the presence of Cooper Mcgovern MD, F.A.C.C..    Subjective:     CHIEF COMPLAINT / HPI:   Chief Complaint   Patient presents with    Coronary Artery Disease     HX: ASHD, HLD, WES, AAA, CP, Obese. Yearly follow up. Cath on 10/19/23.     He has been feeling about the same. CP off and on, changes how it feels - lasts seconds to a minute. Lightheaded/dizziness with positional changes, especially since he got his boot on his left foot. Palpitations at times.     Denies: SOB.      Dear Dr KirbyKevin is 53 y.o. male with multiple medical problems as outlined below who initially presented for evaluation of chest pain and dizziness.    History of negative stress test on 1/6/2016.    Patient had CTA of aorta to rule out aortic aneurysm and found to have dense calcification of the left main coronary artery leading to cardiac catheterization which revealed mild CAD without any focal stenosis.    No prior history of myocardial infarction or heart failure. History of anxiety/panic attacks.    History of sleep apnea, not using CPAP.    History of dyslipidemia and statin intolerance. Finally tolerating Livalo 1 mg daily, started in July 2017.    Tilt done in 2016, was abnormal, Heart rate,blood pressure response and symptoms were most consistent with dysautonomia.    Holter done in 2016, sinus rhythm with sinus tachycardia 27% of the study duration,average HR 90 bpm ranging between 51 - 147 bpm. Rare isolated PAC's and PVC's    ECG done on (11/11/2018)- Normal sinus rhythm with sinus arrhythmia. Normal ECG. When compared with ECG of 06-OCT-2017 16:13, no significant change was found    EKG done in office 5/17/2019 that showed sinus rhythm with no acute ischemic changes.    Stress test done 5/17/2019- EF 61% Normal myocardial perfusion imaging without evidence of significant myocardial ischemia or infarction. He went 8 minutes and 20 seconds.    ECG done on 11/11/2020 at [place]

## 2024-09-15 ENCOUNTER — APPOINTMENT (OUTPATIENT)
Dept: GENERAL RADIOLOGY | Age: 54
End: 2024-09-15
Payer: COMMERCIAL

## 2024-09-15 ENCOUNTER — HOSPITAL ENCOUNTER (EMERGENCY)
Age: 54
Discharge: HOME | End: 2024-09-15
Payer: COMMERCIAL

## 2024-09-15 VITALS
TEMPERATURE: 98 F | HEART RATE: 77 BPM | DIASTOLIC BLOOD PRESSURE: 106 MMHG | OXYGEN SATURATION: 94 % | SYSTOLIC BLOOD PRESSURE: 156 MMHG | RESPIRATION RATE: 18 BRPM

## 2024-09-15 DIAGNOSIS — M25.562 ACUTE PAIN OF LEFT KNEE: ICD-10-CM

## 2024-09-15 DIAGNOSIS — M79.89 LEG SWELLING: Primary | ICD-10-CM

## 2024-09-15 LAB
ALBUMIN SERPL-MCNC: 3.9 G/DL (ref 3.5–5.2)
ALBUMIN/GLOB SERPL: 1.7 {RATIO} (ref 1–2.5)
ALP SERPL-CCNC: 57 U/L (ref 40–129)
ALT SERPL-CCNC: 30 U/L (ref 10–50)
ANION GAP SERPL CALCULATED.3IONS-SCNC: 12 MMOL/L (ref 9–16)
AST SERPL-CCNC: 23 U/L (ref 10–50)
BASOPHILS # BLD: 0.05 K/UL (ref 0–0.2)
BASOPHILS NFR BLD: 1 % (ref 0–2)
BILIRUB SERPL-MCNC: 0.4 MG/DL (ref 0–1.2)
BUN SERPL-MCNC: 8 MG/DL (ref 6–20)
BUN/CREAT SERPL: 7 (ref 9–20)
CALCIUM SERPL-MCNC: 9 MG/DL (ref 8.6–10.4)
CHLORIDE SERPL-SCNC: 101 MMOL/L (ref 98–107)
CO2 SERPL-SCNC: 24 MMOL/L (ref 20–31)
CREAT SERPL-MCNC: 1.1 MG/DL (ref 0.7–1.2)
D DIMER PPP FEU-MCNC: <0.27 UG/ML FEU (ref 0–0.59)
EOSINOPHIL # BLD: 0.37 K/UL (ref 0–0.44)
EOSINOPHILS RELATIVE PERCENT: 6 % (ref 1–4)
ERYTHROCYTE [DISTWIDTH] IN BLOOD BY AUTOMATED COUNT: 12.9 % (ref 11.8–14.4)
GFR, ESTIMATED: 82 ML/MIN/1.73M2
GLUCOSE SERPL-MCNC: 131 MG/DL (ref 74–99)
HCT VFR BLD AUTO: 45.9 % (ref 40.7–50.3)
HGB BLD-MCNC: 15.3 G/DL (ref 13–17)
IMM GRANULOCYTES # BLD AUTO: <0.03 K/UL (ref 0–0.3)
IMM GRANULOCYTES NFR BLD: 0 %
INR PPP: 1.1
LYMPHOCYTES NFR BLD: 2.2 K/UL (ref 1.1–3.7)
LYMPHOCYTES RELATIVE PERCENT: 34 % (ref 24–43)
MCH RBC QN AUTO: 29.3 PG (ref 25.2–33.5)
MCHC RBC AUTO-ENTMCNC: 33.3 G/DL (ref 28.4–34.8)
MCV RBC AUTO: 87.8 FL (ref 82.6–102.9)
MONOCYTES NFR BLD: 0.72 K/UL (ref 0.1–1.2)
MONOCYTES NFR BLD: 11 % (ref 3–12)
NEUTROPHILS NFR BLD: 48 % (ref 36–65)
NEUTS SEG NFR BLD: 3.16 K/UL (ref 1.5–8.1)
NRBC BLD-RTO: 0 PER 100 WBC
PLATELET # BLD AUTO: 200 K/UL (ref 138–453)
PMV BLD AUTO: 10.9 FL (ref 8.1–13.5)
POTASSIUM SERPL-SCNC: 4 MMOL/L (ref 3.7–5.3)
PROT SERPL-MCNC: 6.2 G/DL (ref 6.6–8.7)
PROTHROMBIN TIME: 13.4 SEC (ref 11.7–14.1)
RBC # BLD AUTO: 5.23 M/UL (ref 4.21–5.77)
SODIUM SERPL-SCNC: 137 MMOL/L (ref 136–145)
WBC OTHER # BLD: 6.5 K/UL (ref 3.5–11.3)

## 2024-09-15 PROCEDURE — 73560 X-RAY EXAM OF KNEE 1 OR 2: CPT

## 2024-09-15 PROCEDURE — 85379 FIBRIN DEGRADATION QUANT: CPT

## 2024-09-15 PROCEDURE — 99284 EMERGENCY DEPT VISIT MOD MDM: CPT

## 2024-09-15 PROCEDURE — 85610 PROTHROMBIN TIME: CPT

## 2024-09-15 PROCEDURE — 85025 COMPLETE CBC W/AUTO DIFF WBC: CPT

## 2024-09-15 PROCEDURE — 80053 COMPREHEN METABOLIC PANEL: CPT

## 2024-09-15 ASSESSMENT — PAIN - FUNCTIONAL ASSESSMENT: PAIN_FUNCTIONAL_ASSESSMENT: 0-10

## 2024-09-15 ASSESSMENT — PAIN DESCRIPTION - LOCATION: LOCATION: LEG

## 2024-09-15 ASSESSMENT — PAIN SCALES - GENERAL: PAINLEVEL_OUTOF10: 1

## 2024-09-15 ASSESSMENT — PAIN DESCRIPTION - ORIENTATION: ORIENTATION: LEFT

## 2025-02-07 ENCOUNTER — HOSPITAL ENCOUNTER (OUTPATIENT)
Age: 55
Discharge: HOME | End: 2025-02-09
Payer: COMMERCIAL

## 2025-02-07 ENCOUNTER — HOSPITAL ENCOUNTER (OUTPATIENT)
Dept: GENERAL RADIOLOGY | Age: 55
Discharge: HOME | End: 2025-02-09
Payer: COMMERCIAL

## 2025-02-07 DIAGNOSIS — R07.81 RIB PAIN ON RIGHT SIDE: ICD-10-CM

## 2025-02-07 PROCEDURE — 71100 X-RAY EXAM RIBS UNI 2 VIEWS: CPT

## 2025-02-20 ENCOUNTER — HOSPITAL ENCOUNTER (OUTPATIENT)
Age: 55
Discharge: HOME | End: 2025-02-22
Payer: COMMERCIAL

## 2025-02-20 ENCOUNTER — HOSPITAL ENCOUNTER (OUTPATIENT)
Dept: GENERAL RADIOLOGY | Age: 55
Discharge: HOME | End: 2025-02-22
Payer: COMMERCIAL

## 2025-02-20 ENCOUNTER — HOSPITAL ENCOUNTER (OUTPATIENT)
Age: 55
Discharge: HOME | End: 2025-02-20
Payer: COMMERCIAL

## 2025-02-20 DIAGNOSIS — R73.01 IMPAIRED FASTING GLUCOSE: ICD-10-CM

## 2025-02-20 DIAGNOSIS — I10 ESSENTIAL HYPERTENSION, BENIGN: ICD-10-CM

## 2025-02-20 DIAGNOSIS — E78.2 MIXED HYPERLIPIDEMIA: ICD-10-CM

## 2025-02-20 DIAGNOSIS — Z12.5 SCREENING PSA (PROSTATE SPECIFIC ANTIGEN): ICD-10-CM

## 2025-02-20 DIAGNOSIS — M25.572 LEFT ANKLE PAIN, UNSPECIFIED CHRONICITY: ICD-10-CM

## 2025-02-20 LAB
ALT SERPL-CCNC: 41 U/L (ref 10–50)
ANION GAP SERPL CALCULATED.3IONS-SCNC: 11 MMOL/L (ref 9–16)
AST SERPL-CCNC: 31 U/L (ref 10–50)
BUN SERPL-MCNC: 13 MG/DL (ref 6–20)
BUN/CREAT SERPL: 13 (ref 9–20)
CALCIUM SERPL-MCNC: 9.4 MG/DL (ref 8.6–10.4)
CHLORIDE SERPL-SCNC: 102 MMOL/L (ref 98–107)
CHOLEST SERPL-MCNC: 108 MG/DL (ref 0–199)
CHOLESTEROL/HDL RATIO: 2.9
CO2 SERPL-SCNC: 25 MMOL/L (ref 20–31)
CREAT SERPL-MCNC: 1 MG/DL (ref 0.7–1.2)
ERYTHROCYTE [DISTWIDTH] IN BLOOD BY AUTOMATED COUNT: 13.8 % (ref 11.8–14.4)
GFR, ESTIMATED: >90 ML/MIN/1.73M2
GLUCOSE SERPL-MCNC: 112 MG/DL (ref 74–99)
HCT VFR BLD AUTO: 48.7 % (ref 40.7–50.3)
HDLC SERPL-MCNC: 37 MG/DL
HGB BLD-MCNC: 16 G/DL (ref 13–17)
LDLC SERPL CALC-MCNC: 31 MG/DL (ref 0–100)
MCH RBC QN AUTO: 28.4 PG (ref 25.2–33.5)
MCHC RBC AUTO-ENTMCNC: 32.9 G/DL (ref 28.4–34.8)
MCV RBC AUTO: 86.5 FL (ref 82.6–102.9)
NRBC BLD-RTO: 0 PER 100 WBC
PLATELET # BLD AUTO: 198 K/UL (ref 138–453)
PMV BLD AUTO: 10.5 FL (ref 8.1–13.5)
POTASSIUM SERPL-SCNC: 4.1 MMOL/L (ref 3.7–5.3)
PSA SERPL-MCNC: 0.62 NG/ML (ref 0–4)
RBC # BLD AUTO: 5.63 M/UL (ref 4.21–5.77)
SODIUM SERPL-SCNC: 138 MMOL/L (ref 136–145)
TRIGL SERPL-MCNC: 200 MG/DL
VLDLC SERPL CALC-MCNC: 40 MG/DL (ref 1–30)
WBC OTHER # BLD: 7.1 K/UL (ref 3.5–11.3)

## 2025-02-20 PROCEDURE — 84460 ALANINE AMINO (ALT) (SGPT): CPT

## 2025-02-20 PROCEDURE — 85027 COMPLETE CBC AUTOMATED: CPT

## 2025-02-20 PROCEDURE — 36415 COLL VENOUS BLD VENIPUNCTURE: CPT

## 2025-02-20 PROCEDURE — 83036 HEMOGLOBIN GLYCOSYLATED A1C: CPT

## 2025-02-20 PROCEDURE — 84450 TRANSFERASE (AST) (SGOT): CPT

## 2025-02-20 PROCEDURE — 80048 BASIC METABOLIC PNL TOTAL CA: CPT

## 2025-02-20 PROCEDURE — G0103 PSA SCREENING: HCPCS

## 2025-02-20 PROCEDURE — 80061 LIPID PANEL: CPT

## 2025-02-20 PROCEDURE — 73600 X-RAY EXAM OF ANKLE: CPT

## 2025-02-21 LAB
EST. AVERAGE GLUCOSE BLD GHB EST-MCNC: 134 MG/DL
HBA1C MFR BLD: 6.3 % (ref 4–6)

## 2025-05-12 ENCOUNTER — OFFICE VISIT (OUTPATIENT)
Dept: UROLOGY | Age: 55
End: 2025-05-12
Payer: COMMERCIAL

## 2025-05-12 VITALS
WEIGHT: 252 LBS | SYSTOLIC BLOOD PRESSURE: 127 MMHG | BODY MASS INDEX: 38.19 KG/M2 | HEIGHT: 68 IN | DIASTOLIC BLOOD PRESSURE: 84 MMHG | TEMPERATURE: 98.4 F

## 2025-05-12 DIAGNOSIS — M80.00XA OSTEOPOROSIS WITH CURRENT PATHOLOGICAL FRACTURE, UNSPECIFIED OSTEOPOROSIS TYPE, INITIAL ENCOUNTER: ICD-10-CM

## 2025-05-12 DIAGNOSIS — N13.8 BPH WITH OBSTRUCTION/LOWER URINARY TRACT SYMPTOMS: Primary | ICD-10-CM

## 2025-05-12 DIAGNOSIS — N40.1 BPH WITH OBSTRUCTION/LOWER URINARY TRACT SYMPTOMS: Primary | ICD-10-CM

## 2025-05-12 DIAGNOSIS — R35.1 NOCTURIA: ICD-10-CM

## 2025-05-12 PROCEDURE — 99214 OFFICE O/P EST MOD 30 MIN: CPT | Performed by: UROLOGY

## 2025-05-12 RX ORDER — TAMSULOSIN HYDROCHLORIDE 0.4 MG/1
0.4 CAPSULE ORAL 2 TIMES DAILY
Qty: 180 CAPSULE | Refills: 3 | Status: SHIPPED | OUTPATIENT
Start: 2025-05-12

## 2025-05-12 NOTE — PROGRESS NOTES
HPI:          Patient is a 54 y.o. male in no acute distress.  He is alert and oriented to person, place, and time.         History  2017 left inguinal hernia repair     12/2022 Referral for left testicular pain. CT and scrotal US negative for  abnormalities              Doxycycline x3 weeks for epididymitis     2/2023 Follow-up for epididymitis.  He did complete a 3-week course of doxycycline.  He continues to have intermittent testicular pain, but he does feel that this is more in his suprapubic area.  He denies any dysuria or gross hematuria.  He does have a daily bowel movement.  He does have nocturia 2-4 times per night.  He does have postvoid dribbling.  He denies daytime frequency.  PVR is low. He consumes 1 can of root beer per day.              Started flomax                 Levaquin     Currently  Patient is here today for 1 year follow-up.  We have seen the patient in the past for testicular pain.  Patient patient also seen here for BPH.  He is taking Flomax twice a day..  Patient states that he is lost 3 inches recently in height due to osteoporosis or osteopenia.  Nobody has ever checked his testosterone level.  No pain today.    Past Medical History:   Diagnosis Date    Anxiety     Asthma     Brain lesion     on MRI 2011, stable on MRI 2016 - thought to be due to sequela of migraine or mild small vessel ischemic disease    Depression     Essential and other specified forms of tremor     Fatty liver disease, nonalcoholic     GERD (gastroesophageal reflux disease)     H/O echocardiogram 01/05/2016    EF 55%. Mild LV hypertrophy. Mild diastolic dysfunction is seen.    Herniated disc     History of echocardiogram 12/12/2018    EF 60%. LV wall thickness is mildly increased. Mild pulmonic regurg.     History of Holter monitoring 09/20/2016    sinus rhythm with sinus tachycardia 27% of the study duration,average HR 90 bpm ranging between 51 - 147 bpm. Rare isolated PAC's and PVC's    History of stress

## 2025-05-17 ENCOUNTER — HOSPITAL ENCOUNTER (OUTPATIENT)
Age: 55
Discharge: HOME | End: 2025-05-17
Payer: COMMERCIAL

## 2025-05-17 DIAGNOSIS — M80.00XA OSTEOPOROSIS WITH CURRENT PATHOLOGICAL FRACTURE, UNSPECIFIED OSTEOPOROSIS TYPE, INITIAL ENCOUNTER: ICD-10-CM

## 2025-05-17 LAB
SHBG SERPL-SCNC: 49 NMOL/L (ref 19–76)
TESTOST FREE MFR SERPL: 58.8 PG/ML (ref 47–244)
TESTOST SERPL-MCNC: 377 NG/DL (ref 193–740)
TESTOSTERONE, BIOAVAILABLE: 137.7 NG/DL (ref 130–680)

## 2025-05-17 PROCEDURE — 36415 COLL VENOUS BLD VENIPUNCTURE: CPT

## 2025-05-17 PROCEDURE — 84270 ASSAY OF SEX HORMONE GLOBUL: CPT

## 2025-05-17 PROCEDURE — 84403 ASSAY OF TOTAL TESTOSTERONE: CPT

## 2025-05-19 ENCOUNTER — RESULTS FOLLOW-UP (OUTPATIENT)
Dept: UROLOGY | Age: 55
End: 2025-05-19

## 2025-06-23 RX ORDER — MIDODRINE HYDROCHLORIDE 10 MG/1
10 TABLET ORAL 2 TIMES DAILY
Qty: 180 TABLET | Refills: 3 | Status: SHIPPED | OUTPATIENT
Start: 2025-06-23

## 2025-07-07 ENCOUNTER — OFFICE VISIT (OUTPATIENT)
Dept: UROLOGY | Age: 55
End: 2025-07-07
Payer: COMMERCIAL

## 2025-07-07 VITALS
HEIGHT: 68 IN | TEMPERATURE: 97.1 F | WEIGHT: 255 LBS | BODY MASS INDEX: 38.65 KG/M2 | SYSTOLIC BLOOD PRESSURE: 118 MMHG | DIASTOLIC BLOOD PRESSURE: 66 MMHG

## 2025-07-07 DIAGNOSIS — R35.1 NOCTURIA: ICD-10-CM

## 2025-07-07 DIAGNOSIS — M80.00XA OSTEOPOROSIS WITH CURRENT PATHOLOGICAL FRACTURE, UNSPECIFIED OSTEOPOROSIS TYPE, INITIAL ENCOUNTER: ICD-10-CM

## 2025-07-07 DIAGNOSIS — N40.1 BPH WITH OBSTRUCTION/LOWER URINARY TRACT SYMPTOMS: Primary | ICD-10-CM

## 2025-07-07 DIAGNOSIS — N13.8 BPH WITH OBSTRUCTION/LOWER URINARY TRACT SYMPTOMS: Primary | ICD-10-CM

## 2025-07-07 PROCEDURE — 99214 OFFICE O/P EST MOD 30 MIN: CPT | Performed by: UROLOGY

## 2025-07-07 NOTE — PROGRESS NOTES
HPI:      Patient is a 54 y.o. male in no acute distress.  He is alert and oriented to person, place, and time.       History  2017 left inguinal hernia repair     12/2022 Referral for left testicular pain. CT and scrotal US negative for  abnormalities              Doxycycline x3 weeks for epididymitis     2/2023 Follow-up for epididymitis.  He did complete a 3-week course of doxycycline.  He continues to have intermittent testicular pain, but he does feel that this is more in his suprapubic area.  He denies any dysuria or gross hematuria.  He does have a daily bowel movement.  He does have nocturia 2-4 times per night.  He does have postvoid dribbling.  He denies daytime frequency.  PVR is low. He consumes 1 can of root beer per day.              Started flomax                 Levaquin    5/2025 - testosterone level 377     Today:  Patient is here today for follow-up.  He is concerned about osteoporosis and osteopenia.  He did have a prior DEXA scan.  He is currently on Fosamax calcium and vitamin D.  He was wondering what his testosterone level was.  We did obtain a testosterone level prior to the visit today which was 377.  We did make patient aware that this is a normal number for a patient who is 54 years old.  We do not initiate testosterone supplementation and less there are 2 levels that are below 300.  He continues take Flomax twice a day.  He is happy with the symptom relief.  He does have occasional nights when he has nocturia.    Past Medical History:   Diagnosis Date    Anxiety     Asthma     Brain lesion     on MRI 2011, stable on MRI 2016 - thought to be due to sequela of migraine or mild small vessel ischemic disease    Depression     Essential and other specified forms of tremor     Fatty liver disease, nonalcoholic     GERD (gastroesophageal reflux disease)     H/O echocardiogram 01/05/2016    EF 55%. Mild LV hypertrophy. Mild diastolic dysfunction is seen.    Herniated disc     History of

## 2025-07-28 RX ORDER — PITAVASTATIN CALCIUM 1.04 MG/1
1 TABLET, FILM COATED ORAL NIGHTLY
Qty: 90 TABLET | Refills: 3 | Status: SHIPPED | OUTPATIENT
Start: 2025-07-28

## 2025-08-09 ENCOUNTER — HOSPITAL ENCOUNTER (OUTPATIENT)
Dept: LAB | Age: 55
Discharge: HOME | End: 2025-08-09
Payer: COMMERCIAL

## 2025-08-09 DIAGNOSIS — R73.01 IMPAIRED FASTING GLUCOSE: ICD-10-CM

## 2025-08-09 DIAGNOSIS — E78.2 MIXED HYPERLIPIDEMIA: ICD-10-CM

## 2025-08-09 DIAGNOSIS — I10 ESSENTIAL HYPERTENSION, BENIGN: ICD-10-CM

## 2025-08-09 LAB
ALT SERPL-CCNC: 46 U/L (ref 10–50)
ANION GAP SERPL CALCULATED.3IONS-SCNC: 12 MMOL/L (ref 9–16)
AST SERPL-CCNC: 31 U/L (ref 10–50)
BUN SERPL-MCNC: 13 MG/DL (ref 6–20)
BUN/CREAT SERPL: 13 (ref 9–20)
CALCIUM SERPL-MCNC: 9.5 MG/DL (ref 8.6–10.4)
CHLORIDE SERPL-SCNC: 101 MMOL/L (ref 98–107)
CHOLEST SERPL-MCNC: 118 MG/DL (ref 0–199)
CHOLESTEROL/HDL RATIO: 3.2
CO2 SERPL-SCNC: 23 MMOL/L (ref 20–31)
CREAT SERPL-MCNC: 1 MG/DL (ref 0.7–1.2)
ERYTHROCYTE [DISTWIDTH] IN BLOOD BY AUTOMATED COUNT: 13.6 % (ref 11.8–14.4)
EST. AVERAGE GLUCOSE BLD GHB EST-MCNC: 148 MG/DL
GFR, ESTIMATED: >90 ML/MIN/1.73M2
GLUCOSE SERPL-MCNC: 145 MG/DL (ref 74–99)
HBA1C MFR BLD: 6.8 % (ref 4–6)
HCT VFR BLD AUTO: 48.7 % (ref 40.7–50.3)
HDLC SERPL-MCNC: 37 MG/DL
HGB BLD-MCNC: 15.9 G/DL (ref 13–17)
LDLC SERPL CALC-MCNC: 37 MG/DL (ref 0–100)
MCH RBC QN AUTO: 28.8 PG (ref 25.2–33.5)
MCHC RBC AUTO-ENTMCNC: 32.6 G/DL (ref 28.4–34.8)
MCV RBC AUTO: 88.2 FL (ref 82.6–102.9)
NRBC BLD-RTO: 0 PER 100 WBC
PLATELET # BLD AUTO: 227 K/UL (ref 138–453)
PMV BLD AUTO: 10.5 FL (ref 8.1–13.5)
POTASSIUM SERPL-SCNC: 4.3 MMOL/L (ref 3.7–5.3)
RBC # BLD AUTO: 5.52 M/UL (ref 4.21–5.77)
SODIUM SERPL-SCNC: 136 MMOL/L (ref 136–145)
TRIGL SERPL-MCNC: 221 MG/DL
VLDLC SERPL CALC-MCNC: 44 MG/DL (ref 1–30)
WBC OTHER # BLD: 6.7 K/UL (ref 3.5–11.3)

## 2025-08-09 PROCEDURE — 84450 TRANSFERASE (AST) (SGOT): CPT

## 2025-08-09 PROCEDURE — 80061 LIPID PANEL: CPT

## 2025-08-09 PROCEDURE — 83036 HEMOGLOBIN GLYCOSYLATED A1C: CPT

## 2025-08-09 PROCEDURE — 80048 BASIC METABOLIC PNL TOTAL CA: CPT

## 2025-08-09 PROCEDURE — 84460 ALANINE AMINO (ALT) (SGPT): CPT

## 2025-08-09 PROCEDURE — 36415 COLL VENOUS BLD VENIPUNCTURE: CPT

## 2025-08-09 PROCEDURE — 85027 COMPLETE CBC AUTOMATED: CPT

## 2025-08-13 ENCOUNTER — HOSPITAL ENCOUNTER (OUTPATIENT)
Dept: GENERAL RADIOLOGY | Age: 55
Discharge: HOME | End: 2025-08-15
Payer: COMMERCIAL

## 2025-08-13 DIAGNOSIS — M79.605 LEFT LEG PAIN: ICD-10-CM

## 2025-08-13 PROCEDURE — 73600 X-RAY EXAM OF ANKLE: CPT

## 2025-08-13 PROCEDURE — 73590 X-RAY EXAM OF LOWER LEG: CPT

## (undated) DEVICE — BENTSON WIRE GUIDE 20CM DISTAL FLEXIBILITY WITH SOFTENED TIP: Brand: BENTSON

## (undated) DEVICE — RADIFOCUS OPTITORQUE ANGIOGRAPHIC CATHETER: Brand: OPTITORQUE

## (undated) DEVICE — GLOVE ORANGE PI 7 1/2   MSG9075

## (undated) DEVICE — DRAPE, RADIAL, STERILE: Brand: MEDLINE

## (undated) DEVICE — CATHETER ANGIO 6FR L110CM ID0.056IN VENT PIG CRV ROBUST

## (undated) DEVICE — GLIDESHEATH NITINOL HYDROPHILIC COATED INTRODUCER SHEATH: Brand: GLIDESHEATH

## (undated) DEVICE — MERCY TIFFIN CATH LAB PACK: Brand: MEDLINE INDUSTRIES, INC.